# Patient Record
Sex: FEMALE | Race: WHITE | Employment: OTHER | URBAN - METROPOLITAN AREA
[De-identification: names, ages, dates, MRNs, and addresses within clinical notes are randomized per-mention and may not be internally consistent; named-entity substitution may affect disease eponyms.]

---

## 2017-11-04 ENCOUNTER — HOSPITAL ENCOUNTER (OUTPATIENT)
Dept: MAMMOGRAPHY | Age: 67
Discharge: HOME | End: 2017-11-04
Attending: INTERNAL MEDICINE
Payer: COMMERCIAL

## 2017-11-04 DIAGNOSIS — Z12.31 ENCOUNTER FOR SCREENING MAMMOGRAM FOR MALIGNANT NEOPLASM OF BREAST: ICD-10-CM

## 2017-11-04 PROCEDURE — 77067 SCR MAMMO BI INCL CAD: CPT | Performed by: INTERNAL MEDICINE

## 2017-11-11 ENCOUNTER — HOSPITAL ENCOUNTER (OUTPATIENT)
Dept: GENERAL RADIOLOGY | Facility: HOSPITAL | Age: 67
Discharge: HOME | End: 2017-11-11
Attending: INTERNAL MEDICINE
Payer: COMMERCIAL

## 2017-11-11 DIAGNOSIS — J18.9 PNEUMONIA, ORGANISM UNSPECIFIED(486): ICD-10-CM

## 2017-11-11 PROCEDURE — 71020 XR CHEST PA + LAT CHEST (CPT=71020): CPT | Performed by: INTERNAL MEDICINE

## 2018-11-06 ENCOUNTER — HOSPITAL ENCOUNTER (OUTPATIENT)
Dept: MAMMOGRAPHY | Age: 68
Discharge: HOME | End: 2018-11-06
Attending: INTERNAL MEDICINE
Payer: COMMERCIAL

## 2018-11-06 DIAGNOSIS — Z12.31 VISIT FOR SCREENING MAMMOGRAM: ICD-10-CM

## 2018-11-06 PROCEDURE — 77063 BREAST TOMOSYNTHESIS BI: CPT | Performed by: INTERNAL MEDICINE

## 2018-11-06 PROCEDURE — 77067 SCR MAMMO BI INCL CAD: CPT | Performed by: INTERNAL MEDICINE

## 2018-12-08 ENCOUNTER — HOSPITAL ENCOUNTER (OUTPATIENT)
Dept: GENERAL RADIOLOGY | Age: 68
Discharge: HOME | End: 2018-12-08
Attending: INTERNAL MEDICINE
Payer: COMMERCIAL

## 2018-12-08 DIAGNOSIS — Z00.00 ROUTINE GENERAL MEDICAL EXAMINATION AT A HEALTH CARE FACILITY: ICD-10-CM

## 2018-12-08 DIAGNOSIS — J44.9 OBSTRUCTIVE CHRONIC BRONCHITIS WITHOUT EXACERBATION (HCC): ICD-10-CM

## 2018-12-08 PROCEDURE — 71046 X-RAY EXAM CHEST 2 VIEWS: CPT | Performed by: INTERNAL MEDICINE

## 2018-12-28 PROBLEM — K50.90 CROHN'S DISEASE, UNSPECIFIED, WITHOUT COMPLICATIONS (HCC): Status: ACTIVE | Noted: 2018-12-28

## 2018-12-31 ENCOUNTER — HOSPITAL ENCOUNTER (OUTPATIENT)
Dept: BONE DENSITY | Age: 68
Discharge: HOME | End: 2018-12-31
Attending: INTERNAL MEDICINE
Payer: COMMERCIAL

## 2018-12-31 DIAGNOSIS — M81.8 OTHER OSTEOPOROSIS, UNSPECIFIED PATHOLOGICAL FRACTURE PRESENCE: ICD-10-CM

## 2018-12-31 PROCEDURE — 77080 DXA BONE DENSITY AXIAL: CPT | Performed by: INTERNAL MEDICINE

## 2019-06-06 ENCOUNTER — OFFICE VISIT (OUTPATIENT)
Dept: FAMILY MEDICINE CLINIC | Facility: CLINIC | Age: 69
End: 2019-06-06
Payer: COMMERCIAL

## 2019-06-06 DIAGNOSIS — J20.9 BRONCHITIS WITH BRONCHOSPASM: Primary | ICD-10-CM

## 2019-06-06 DIAGNOSIS — J01.40 ACUTE NON-RECURRENT PANSINUSITIS: ICD-10-CM

## 2019-06-06 PROCEDURE — 94640 AIRWAY INHALATION TREATMENT: CPT | Performed by: NURSE PRACTITIONER

## 2019-06-06 PROCEDURE — 99202 OFFICE O/P NEW SF 15 MIN: CPT | Performed by: NURSE PRACTITIONER

## 2019-06-06 RX ORDER — ALBUTEROL SULFATE 90 UG/1
2 AEROSOL, METERED RESPIRATORY (INHALATION) EVERY 4 HOURS PRN
Qty: 1 INHALER | Refills: 2 | Status: SHIPPED | OUTPATIENT
Start: 2019-06-06 | End: 2020-10-30

## 2019-06-06 RX ORDER — AMOXICILLIN AND CLAVULANATE POTASSIUM 875; 125 MG/1; MG/1
1 TABLET, FILM COATED ORAL 2 TIMES DAILY
Qty: 20 TABLET | Refills: 0 | Status: SHIPPED | OUTPATIENT
Start: 2019-06-06 | End: 2019-06-16

## 2019-06-06 RX ORDER — PREDNISONE 20 MG/1
TABLET ORAL
Qty: 18 TABLET | Refills: 0 | Status: SHIPPED | OUTPATIENT
Start: 2019-06-06 | End: 2019-06-20 | Stop reason: ALTCHOICE

## 2019-06-06 RX ORDER — IPRATROPIUM BROMIDE AND ALBUTEROL SULFATE 2.5; .5 MG/3ML; MG/3ML
3 SOLUTION RESPIRATORY (INHALATION) ONCE
Status: COMPLETED | OUTPATIENT
Start: 2019-06-06 | End: 2019-06-06

## 2019-06-06 RX ADMIN — IPRATROPIUM BROMIDE AND ALBUTEROL SULFATE 3 ML: 2.5; .5 SOLUTION RESPIRATORY (INHALATION) at 08:50:00

## 2019-06-06 RX ADMIN — IPRATROPIUM BROMIDE AND ALBUTEROL SULFATE 3 ML: 2.5; .5 SOLUTION RESPIRATORY (INHALATION) at 08:47:00

## 2019-06-06 NOTE — PATIENT INSTRUCTIONS
Bronchitis, Antibiotic Treatment (Adult)    Bronchitis is an infection of the air passages (bronchial tubes) in your lungs. It often occurs when you have a cold.  This illness is contagious during the first few days and is spread through the air by coughi Follow up with your healthcare provider, or as advised. If you had an X-ray or ECG (electrocardiogram), a specialist will review it. You will be told of any new test results that may affect your care.   If you are age 72 or older, if you smoke, or if you ha · Drink plenty of water, hot tea, and other liquids. This may help thin nasal mucus. It also may help your sinuses drain fluids. · Heat may help soothe painful areas of your face. Use a towel soaked in hot water.  Or,  the shower and direct the war Call your healthcare provider if any of these occur:  · Facial pain or headache that gets worse  · Stiff neck  · Unusual drowsiness or confusion  · Swelling of your forehead or eyelids  · Vision problems, such as blurred or double vision  · Fever of 100.4º

## 2019-06-09 VITALS
SYSTOLIC BLOOD PRESSURE: 120 MMHG | HEART RATE: 88 BPM | OXYGEN SATURATION: 98 % | RESPIRATION RATE: 22 BRPM | TEMPERATURE: 99 F | DIASTOLIC BLOOD PRESSURE: 77 MMHG

## 2019-06-09 NOTE — PROGRESS NOTES
CHIEF COMPLAINT:   \" Patient presents with:  Cough  Sinus Problem     HPI:   Carmen Ladd is a 80year old female who presents with a hx of Frontal and Maxillary sinus congestion and pressure and a severe cough.   Pt's bronchospasms became so severe t Sputum production    • Wears glasses    • Weight gain    • Wheezing       Past Surgical History:   Procedure Laterality Date   • BOWEL RESECTION     • CATARACT Left 09/2018   • CATARACT Right 10/2018   • CHOLECYSTECTOMY     • COLECTOMY     • CORRECT BUNION well and felt that she could take a deeper breath without coughing after the treatment. Decreased wheezing. Rhonchi cleared. Improved aeration to bases and apexes. Continued diffuse end inspiratory wheezes. Pt feels marked relief.   2nd DuoNeb treatmen

## 2019-08-13 ENCOUNTER — HOSPITAL ENCOUNTER (OUTPATIENT)
Dept: GENERAL RADIOLOGY | Facility: HOSPITAL | Age: 69
Discharge: HOME | End: 2019-08-13
Attending: INTERNAL MEDICINE
Payer: COMMERCIAL

## 2019-08-13 DIAGNOSIS — M25.572 LEFT ANKLE PAIN, UNSPECIFIED CHRONICITY: ICD-10-CM

## 2019-08-13 DIAGNOSIS — M79.672 PAIN OF LEFT HEEL: ICD-10-CM

## 2019-08-13 PROCEDURE — 73610 X-RAY EXAM OF ANKLE: CPT | Performed by: INTERNAL MEDICINE

## 2019-08-13 PROCEDURE — 73650 X-RAY EXAM OF HEEL: CPT | Performed by: INTERNAL MEDICINE

## 2020-03-14 ENCOUNTER — HOSPITAL ENCOUNTER (EMERGENCY)
Facility: HOSPITAL | Age: 70
Discharge: HOME | End: 2020-03-14
Attending: EMERGENCY MEDICINE
Payer: COMMERCIAL

## 2020-03-14 VITALS
HEART RATE: 82 BPM | TEMPERATURE: 98 F | DIASTOLIC BLOOD PRESSURE: 60 MMHG | BODY MASS INDEX: 26 KG/M2 | OXYGEN SATURATION: 98 % | WEIGHT: 145 LBS | SYSTOLIC BLOOD PRESSURE: 141 MMHG | RESPIRATION RATE: 18 BRPM

## 2020-03-14 DIAGNOSIS — E86.0 DEHYDRATION: Primary | ICD-10-CM

## 2020-03-14 LAB
ALBUMIN SERPL-MCNC: 3.4 G/DL (ref 3.4–5)
ALBUMIN/GLOB SERPL: 0.8 {RATIO} (ref 1–2)
ALP LIVER SERPL-CCNC: 81 U/L (ref 55–142)
ALT SERPL-CCNC: 20 U/L (ref 13–56)
ANION GAP SERPL CALC-SCNC: 8 MMOL/L (ref 0–18)
AST SERPL-CCNC: 17 U/L (ref 15–37)
BASOPHILS # BLD AUTO: 0.13 X10(3) UL (ref 0–0.2)
BASOPHILS NFR BLD AUTO: 1.4 %
BILIRUB SERPL-MCNC: 0.6 MG/DL (ref 0.1–2)
BILIRUB UR QL STRIP.AUTO: NEGATIVE
BUN BLD-MCNC: 27 MG/DL (ref 7–18)
BUN/CREAT SERPL: 23.1 (ref 10–20)
CALCIUM BLD-MCNC: 9.1 MG/DL (ref 8.5–10.1)
CHLORIDE SERPL-SCNC: 106 MMOL/L (ref 98–112)
CLARITY UR REFRACT.AUTO: CLEAR
CO2 SERPL-SCNC: 21 MMOL/L (ref 21–32)
COLOR UR AUTO: YELLOW
CREAT BLD-MCNC: 1.17 MG/DL (ref 0.55–1.02)
DEPRECATED RDW RBC AUTO: 41.6 FL (ref 35.1–46.3)
EOSINOPHIL # BLD AUTO: 0.37 X10(3) UL (ref 0–0.7)
EOSINOPHIL NFR BLD AUTO: 4.1 %
ERYTHROCYTE [DISTWIDTH] IN BLOOD BY AUTOMATED COUNT: 13 % (ref 11–15)
GLOBULIN PLAS-MCNC: 4.3 G/DL (ref 2.8–4.4)
GLUCOSE BLD-MCNC: 126 MG/DL (ref 70–99)
GLUCOSE UR STRIP.AUTO-MCNC: NEGATIVE MG/DL
HCT VFR BLD AUTO: 47.4 % (ref 35–48)
HGB BLD-MCNC: 15.6 G/DL (ref 12–16)
IMM GRANULOCYTES # BLD AUTO: 0.06 X10(3) UL (ref 0–1)
IMM GRANULOCYTES NFR BLD: 0.7 %
KETONES UR STRIP.AUTO-MCNC: NEGATIVE MG/DL
LYMPHOCYTES # BLD AUTO: 2.27 X10(3) UL (ref 1–4)
LYMPHOCYTES NFR BLD AUTO: 25.1 %
M PROTEIN MFR SERPL ELPH: 7.7 G/DL (ref 6.4–8.2)
MCH RBC QN AUTO: 29 PG (ref 26–34)
MCHC RBC AUTO-ENTMCNC: 32.9 G/DL (ref 31–37)
MCV RBC AUTO: 88.1 FL (ref 80–100)
MONOCYTES # BLD AUTO: 0.67 X10(3) UL (ref 0.1–1)
MONOCYTES NFR BLD AUTO: 7.4 %
NEUTROPHILS # BLD AUTO: 5.53 X10 (3) UL (ref 1.5–7.7)
NEUTROPHILS # BLD AUTO: 5.53 X10(3) UL (ref 1.5–7.7)
NEUTROPHILS NFR BLD AUTO: 61.3 %
NITRITE UR QL STRIP.AUTO: NEGATIVE
OSMOLALITY SERPL CALC.SUM OF ELEC: 287 MOSM/KG (ref 275–295)
PH UR STRIP.AUTO: 6 [PH] (ref 4.5–8)
PLATELET # BLD AUTO: 323 10(3)UL (ref 150–450)
POTASSIUM SERPL-SCNC: 4 MMOL/L (ref 3.5–5.1)
PROT UR STRIP.AUTO-MCNC: NEGATIVE MG/DL
RBC # BLD AUTO: 5.38 X10(6)UL (ref 3.8–5.3)
RBC UR QL AUTO: NEGATIVE
SODIUM SERPL-SCNC: 135 MMOL/L (ref 136–145)
SP GR UR STRIP.AUTO: 1.03 (ref 1–1.03)
UROBILINOGEN UR STRIP.AUTO-MCNC: <2 MG/DL
WBC # BLD AUTO: 9 X10(3) UL (ref 4–11)

## 2020-03-14 PROCEDURE — 99284 EMERGENCY DEPT VISIT MOD MDM: CPT

## 2020-03-14 PROCEDURE — 84132 ASSAY OF SERUM POTASSIUM: CPT | Performed by: EMERGENCY MEDICINE

## 2020-03-14 PROCEDURE — 81001 URINALYSIS AUTO W/SCOPE: CPT | Performed by: EMERGENCY MEDICINE

## 2020-03-14 PROCEDURE — 84450 TRANSFERASE (AST) (SGOT): CPT | Performed by: EMERGENCY MEDICINE

## 2020-03-14 PROCEDURE — 80053 COMPREHEN METABOLIC PANEL: CPT | Performed by: EMERGENCY MEDICINE

## 2020-03-14 PROCEDURE — 85025 COMPLETE CBC W/AUTO DIFF WBC: CPT | Performed by: EMERGENCY MEDICINE

## 2020-03-14 PROCEDURE — 96360 HYDRATION IV INFUSION INIT: CPT

## 2020-03-14 NOTE — ED PROVIDER NOTES
Patient Seen in: [facility] Emergency Department      History   Patient presents with:  Fatigue    Stated Complaint: hx of chrons ~ mild cramping and overall fatigued ~ possibly dehydration     HPI  70-year-old female presents to the ER complaining (Oral)   Resp 18   Wt 65.8 kg   SpO2 98%   BMI 25.69 kg/m²         Physical Exam  General: This a pleasant nontoxic appearing patient in no apparent distress alert and oriented ×3  HEENT: Pupils are equal reactive to light.   Extra ocular motions are intact ---------                               -----------         ------                     CBC W/ DIFFERENTIAL[[id number]]          Abnormal            Final result                 Please view results for these tests on the individual orders.    RAINBOW DRAW B

## 2020-11-03 ENCOUNTER — HOSPITAL ENCOUNTER (OUTPATIENT)
Dept: GENERAL RADIOLOGY | Age: 70
Discharge: HOME | End: 2020-11-03
Attending: INTERNAL MEDICINE
Payer: COMMERCIAL

## 2020-11-03 ENCOUNTER — HOSPITAL ENCOUNTER (OUTPATIENT)
Dept: MAMMOGRAPHY | Age: 70
Discharge: HOME | End: 2020-11-03
Attending: INTERNAL MEDICINE
Payer: COMMERCIAL

## 2020-11-03 DIAGNOSIS — Z12.31 ENCOUNTER FOR SCREENING MAMMOGRAM FOR MALIGNANT NEOPLASM OF BREAST: ICD-10-CM

## 2020-11-03 DIAGNOSIS — J44.9 CHRONIC OBSTRUCTIVE PULMONARY DISEASE, UNSPECIFIED COPD TYPE (HCC): ICD-10-CM

## 2020-11-03 PROCEDURE — 71046 X-RAY EXAM CHEST 2 VIEWS: CPT | Performed by: INTERNAL MEDICINE

## 2020-11-03 PROCEDURE — 77063 BREAST TOMOSYNTHESIS BI: CPT | Performed by: INTERNAL MEDICINE

## 2020-11-03 PROCEDURE — 77067 SCR MAMMO BI INCL CAD: CPT | Performed by: INTERNAL MEDICINE

## 2020-11-04 ENCOUNTER — LAB ENCOUNTER (OUTPATIENT)
Dept: LAB | Age: 70
End: 2020-11-04
Attending: INTERNAL MEDICINE
Payer: COMMERCIAL

## 2020-11-04 DIAGNOSIS — R10.32 ACUTE LEFT LOWER QUADRANT PAIN: ICD-10-CM

## 2020-11-04 PROCEDURE — 80053 COMPREHEN METABOLIC PANEL: CPT

## 2020-11-04 PROCEDURE — 36415 COLL VENOUS BLD VENIPUNCTURE: CPT

## 2020-11-04 PROCEDURE — 85025 COMPLETE CBC W/AUTO DIFF WBC: CPT

## 2020-11-18 ENCOUNTER — HOSPITAL ENCOUNTER (OUTPATIENT)
Dept: CT IMAGING | Facility: HOSPITAL | Age: 70
Discharge: HOME | End: 2020-11-18
Attending: NURSE PRACTITIONER
Payer: COMMERCIAL

## 2020-11-18 DIAGNOSIS — Z87.19 HISTORY OF CROHN'S DISEASE: ICD-10-CM

## 2020-11-18 DIAGNOSIS — R10.32 ACUTE LEFT LOWER QUADRANT PAIN: ICD-10-CM

## 2020-11-18 PROCEDURE — 74177 CT ABD & PELVIS W/CONTRAST: CPT | Performed by: NURSE PRACTITIONER

## 2020-12-21 ENCOUNTER — OFFICE VISIT (OUTPATIENT)
Dept: HEMATOLOGY/ONCOLOGY | Facility: HOSPITAL | Age: 70
End: 2020-12-21
Attending: INTERNAL MEDICINE
Payer: COMMERCIAL

## 2020-12-21 VITALS
SYSTOLIC BLOOD PRESSURE: 138 MMHG | BODY MASS INDEX: 26.77 KG/M2 | DIASTOLIC BLOOD PRESSURE: 75 MMHG | HEART RATE: 68 BPM | HEIGHT: 61.26 IN | RESPIRATION RATE: 16 BRPM | TEMPERATURE: 98 F | OXYGEN SATURATION: 98 % | WEIGHT: 143.63 LBS

## 2020-12-21 DIAGNOSIS — R59.0 RETROPERITONEAL LYMPHADENOPATHY: Primary | ICD-10-CM

## 2020-12-21 DIAGNOSIS — E79.0 ELEVATED URIC ACID IN BLOOD: ICD-10-CM

## 2020-12-21 DIAGNOSIS — K50.90 CROHN'S DISEASE WITHOUT COMPLICATION, UNSPECIFIED GASTROINTESTINAL TRACT LOCATION (HCC): ICD-10-CM

## 2020-12-21 PROCEDURE — 99245 OFF/OP CONSLTJ NEW/EST HI 55: CPT | Performed by: INTERNAL MEDICINE

## 2020-12-21 NOTE — PROGRESS NOTES
Hematology/Oncology Initial Consultation Note    Patient Name: Gena Wilson  Medical Record Number: [id number]    YOB: 1950   Date of Consultation: 12/21/2020   PCP: Dr. Alfonso Arteaga  Other providers:  Dr. Yareli Mckeon (GI)    Reason for Con Blurred vision    • Chest pain on exertion smoker    pressure   • Chronic cough    • Crohn's disease (HCC)    • Diabetes mellitus (HCC)    • Diarrhea, unspecified    • Easy bruising    • Fatigue    • Fever    • Flatulence/gas pain/belching    • Food intole MCG/ACT Inhalation Aero Soln, Inhale 2 puffs into the lungs every 6 (six) hours as needed for Wheezing., Disp: 1 Inhaler, Rfl: 0    •  Tiotropium Bromide Monohydrate (SPIRIVA RESPIMAT) 2.5 MCG/ACT Inhalation Aero Soln, Inhale 2 puffs into the lungs daily. , 65.1 kg (143 lb 9.6 oz) (12/21 1254)  BSA (Calculated - sq m): 1.65 sq meters (12/21 1254)  Pulse: 68 (12/21 1254)  BP: 138/75 (12/21 1254)  Temp: 98.1 °F (36.7 °C) (12/21 1254)  Do Not Use - Resp Rate: --  SpO2: 98 % (12/21 1254)    Wt Readings from Last CO2 25.0 11/04/2020     No results found for: PTT, PT, INR    Lab Results   Component Value Date     12/21/2020     Lab Results   Component Value Date    URIC 6.2 (H) 12/21/2020     Imaging:    CT a/p 11/18/20: FINDINGS:  LUNG BASE:  Unremarkable. enlarged retroperitoneal lymph nodes may be reactive, with other etiologies not entirely excluded. Clinical correlation recommended. PET-CT may be of further value. 3. Postoperative changes from previous total colectomy.   Right lower quadrant ileostomy

## 2020-12-21 NOTE — PROGRESS NOTES
Education Record    Learner:  Patient    Disease / Elan Quale abd lymph nodes    Barriers / Limitations:  None   Comments:    Method:  Discussion   Comments:    General Topics:  Plan of care reviewed   Comments:    Outcome:  Shows understanding   C

## 2020-12-23 PROBLEM — E79.0 ELEVATED URIC ACID IN BLOOD: Status: ACTIVE | Noted: 2020-12-23

## 2020-12-23 PROBLEM — R59.0 RETROPERITONEAL LYMPHADENOPATHY: Status: ACTIVE | Noted: 2020-12-23

## 2021-02-06 DIAGNOSIS — Z23 NEED FOR VACCINATION: ICD-10-CM

## 2021-02-07 ENCOUNTER — IMMUNIZATION (OUTPATIENT)
Dept: LAB | Age: 71
End: 2021-02-07
Attending: HOSPITALIST
Payer: COMMERCIAL

## 2021-02-07 DIAGNOSIS — Z23 NEED FOR VACCINATION: Primary | ICD-10-CM

## 2021-02-07 PROCEDURE — 0001A SARSCOV2 VAC 30MCG/0.3ML IM: CPT

## 2021-02-22 ENCOUNTER — HOSPITAL ENCOUNTER (OUTPATIENT)
Dept: CT IMAGING | Facility: HOSPITAL | Age: 71
Discharge: HOME | End: 2021-02-22
Attending: INTERNAL MEDICINE
Payer: COMMERCIAL

## 2021-02-22 DIAGNOSIS — R59.0 RETROPERITONEAL LYMPHADENOPATHY: ICD-10-CM

## 2021-02-22 LAB — CREAT BLD-MCNC: 1 MG/DL

## 2021-02-22 PROCEDURE — 82565 ASSAY OF CREATININE: CPT

## 2021-02-22 PROCEDURE — 74177 CT ABD & PELVIS W/CONTRAST: CPT | Performed by: INTERNAL MEDICINE

## 2021-02-28 ENCOUNTER — IMMUNIZATION (OUTPATIENT)
Dept: LAB | Age: 71
End: 2021-02-28
Attending: HOSPITALIST
Payer: COMMERCIAL

## 2021-02-28 DIAGNOSIS — Z23 NEED FOR VACCINATION: Primary | ICD-10-CM

## 2021-02-28 PROCEDURE — 0002A SARSCOV2 VAC 30MCG/0.3ML IM: CPT

## 2021-03-25 ENCOUNTER — LAB ENCOUNTER (OUTPATIENT)
Dept: LAB | Age: 71
End: 2021-03-25
Attending: INTERNAL MEDICINE
Payer: COMMERCIAL

## 2021-03-25 ENCOUNTER — OFFICE VISIT (OUTPATIENT)
Dept: INTERNAL MEDICINE CLINIC | Facility: CLINIC | Age: 71
End: 2021-03-25
Payer: COMMERCIAL

## 2021-03-25 VITALS
SYSTOLIC BLOOD PRESSURE: 112 MMHG | BODY MASS INDEX: 27.75 KG/M2 | HEART RATE: 69 BPM | HEIGHT: 61.02 IN | RESPIRATION RATE: 16 BRPM | TEMPERATURE: 97 F | DIASTOLIC BLOOD PRESSURE: 62 MMHG | WEIGHT: 147 LBS

## 2021-03-25 DIAGNOSIS — G89.29 CHRONIC EAR PAIN, RIGHT: ICD-10-CM

## 2021-03-25 DIAGNOSIS — H92.01 CHRONIC EAR PAIN, RIGHT: ICD-10-CM

## 2021-03-25 DIAGNOSIS — E55.9 VITAMIN D DEFICIENCY: ICD-10-CM

## 2021-03-25 DIAGNOSIS — E53.8 VITAMIN B12 DEFICIENCY: ICD-10-CM

## 2021-03-25 DIAGNOSIS — Z93.2 ILEOSTOMY STATUS (HCC): ICD-10-CM

## 2021-03-25 DIAGNOSIS — K50.118 CROHN'S DISEASE OF COLON WITH OTHER COMPLICATION (HCC): ICD-10-CM

## 2021-03-25 DIAGNOSIS — J44.9 ASTHMA-COPD OVERLAP SYNDROME (HCC): Primary | ICD-10-CM

## 2021-03-25 DIAGNOSIS — F17.200 SMOKER: ICD-10-CM

## 2021-03-25 PROBLEM — J44.89 ASTHMA-COPD OVERLAP SYNDROME: Status: ACTIVE | Noted: 2021-03-25

## 2021-03-25 PROBLEM — J44.89 ASTHMA-COPD OVERLAP SYNDROME (HCC): Status: ACTIVE | Noted: 2021-03-25

## 2021-03-25 LAB
VIT B12 SERPL-MCNC: 278 PG/ML (ref 193–986)
VIT D+METAB SERPL-MCNC: 21.1 NG/ML (ref 30–100)

## 2021-03-25 PROCEDURE — 82607 VITAMIN B-12: CPT | Performed by: INTERNAL MEDICINE

## 2021-03-25 PROCEDURE — 82306 VITAMIN D 25 HYDROXY: CPT | Performed by: INTERNAL MEDICINE

## 2021-03-25 PROCEDURE — 3074F SYST BP LT 130 MM HG: CPT | Performed by: INTERNAL MEDICINE

## 2021-03-25 PROCEDURE — 99204 OFFICE O/P NEW MOD 45 MIN: CPT | Performed by: INTERNAL MEDICINE

## 2021-03-25 PROCEDURE — 36415 COLL VENOUS BLD VENIPUNCTURE: CPT | Performed by: INTERNAL MEDICINE

## 2021-03-25 PROCEDURE — 3078F DIAST BP <80 MM HG: CPT | Performed by: INTERNAL MEDICINE

## 2021-03-25 PROCEDURE — 3008F BODY MASS INDEX DOCD: CPT | Performed by: INTERNAL MEDICINE

## 2021-03-25 RX ORDER — TIOTROPIUM BROMIDE INHALATION SPRAY 3.12 UG/1
2 SPRAY, METERED RESPIRATORY (INHALATION) DAILY
Qty: 1 INHALER | Refills: 0 | Status: SHIPPED | OUTPATIENT
Start: 2021-03-25

## 2021-03-25 RX ORDER — ALBUTEROL SULFATE 90 UG/1
2 AEROSOL, METERED RESPIRATORY (INHALATION) EVERY 6 HOURS PRN
Qty: 1 INHALER | Refills: 0 | Status: SHIPPED | OUTPATIENT
Start: 2021-03-25

## 2021-03-25 RX ORDER — BUPROPION HYDROCHLORIDE 150 MG/1
150 TABLET ORAL DAILY
Qty: 90 TABLET | Refills: 1 | Status: SHIPPED | OUTPATIENT
Start: 2021-03-25 | End: 2021-09-27

## 2021-03-25 NOTE — PROGRESS NOTES
Claribel Wagner is a 72year old female. Patient presents with:  Establish Care: SN Rm 3  Medication Eval: discuss chronic conditions, medication review      HPI:     Patient is , retired now.  Here for the first time for chronic issues, ear pain, MCG/ACT Nasal Suspension INSTILL ONE SPRAY in each nare TWICE DAILY 16 g 1   • Montelukast Sodium (SINGULAIR) 10 MG Oral Tab Take 1 tablet (10 mg total) by mouth daily. 30 tablet 3   • furosemide 20 MG Oral Tab Take 1 tablet (20 mg total) by mouth daily.  3 depressed and irritable    Vaping Use      Vaping Use: Never used    Alcohol use:  Yes      Alcohol/week: 2.0 standard drinks      Types: 2 Standard drinks or equivalent per week      Comment: no hx of excessive use, pt pt Q6mon    Drug use: Yes      Types: complication (hcc)- s/p total colectomy, no acute symptoms  Ileostomy status (hcc)  Vitamin d deficiency - pt taking 1000 units currently, check level  Vitamin b12 deficiency- due to Crohns, she has poor absorption and was doing monthly injections.  Due to

## 2021-03-29 DIAGNOSIS — E53.8 VITAMIN B12 DEFICIENCY: Primary | ICD-10-CM

## 2021-03-30 ENCOUNTER — TELEPHONE (OUTPATIENT)
Dept: INTERNAL MEDICINE CLINIC | Facility: CLINIC | Age: 71
End: 2021-03-30

## 2021-03-30 NOTE — TELEPHONE ENCOUNTER
Pt is concerned about taking this medication   buPROPion HCl ER, XL, 150 MG Oral Tablet 24 Hr 90 tablet 1 3/25/2021    Sig:   Take 1 tablet (150 mg total) by mouth daily. Route:   Oral       She has questions about it. Its time release? Is it safe?  Ple

## 2021-03-31 ENCOUNTER — NURSE ONLY (OUTPATIENT)
Dept: INTERNAL MEDICINE CLINIC | Facility: CLINIC | Age: 71
End: 2021-03-31
Payer: COMMERCIAL

## 2021-03-31 DIAGNOSIS — E53.8 VITAMIN B12 DEFICIENCY: Primary | ICD-10-CM

## 2021-03-31 PROCEDURE — 96372 THER/PROPH/DIAG INJ SC/IM: CPT | Performed by: INTERNAL MEDICINE

## 2021-03-31 RX ORDER — CYANOCOBALAMIN 1000 UG/ML
1000 INJECTION INTRAMUSCULAR; SUBCUTANEOUS ONCE
Status: COMPLETED | OUTPATIENT
Start: 2021-03-31 | End: 2021-03-31

## 2021-03-31 RX ADMIN — CYANOCOBALAMIN 1000 MCG: 1000 INJECTION INTRAMUSCULAR; SUBCUTANEOUS at 08:45:00

## 2021-04-01 RX ORDER — BUPROPION HYDROCHLORIDE 75 MG/1
75 TABLET ORAL 2 TIMES DAILY
Qty: 60 TABLET | Refills: 2 | Status: SHIPPED | OUTPATIENT
Start: 2021-04-01 | End: 2021-09-27

## 2021-04-01 NOTE — TELEPHONE ENCOUNTER
Pt concerned about the bupropion extended release. States she has an ileostomy and she thought she had been told in the past that ext. Release medications are not effective or there are some issues with absorption. Please advise.  Pt states holding med

## 2021-04-02 ENCOUNTER — TELEPHONE (OUTPATIENT)
Dept: INTERNAL MEDICINE CLINIC | Facility: CLINIC | Age: 71
End: 2021-04-02

## 2021-04-02 NOTE — TELEPHONE ENCOUNTER
Patient indicates pharmacy told her it was half the dose. Patient was advised medication is standard release and not ER as she indicates she had issues with absorption with ER. Patient denies questions or concerns.

## 2021-04-02 NOTE — TELEPHONE ENCOUNTER
Patient states the 75 mg BID may cost more than what she would like to spend. Patient will check with pharmacy and call with any changes needed.

## 2021-04-02 NOTE — TELEPHONE ENCOUNTER
Patient states she has questions regarding the medication;    buPROPion HCl 75 MG Oral Tab 60 tablet 2 4/1/2021    Sig:   Take 1 tablet (75 mg total) by mouth 2 (two) times daily.        Please advise

## 2021-04-20 ENCOUNTER — TELEPHONE (OUTPATIENT)
Dept: INTERNAL MEDICINE CLINIC | Facility: CLINIC | Age: 71
End: 2021-04-20

## 2021-04-20 NOTE — TELEPHONE ENCOUNTER
Pt stated the below medication is making her irritable, nauseous, unable to sleep, ringing in her ear. Wants to know what else can substitute for it. Pt stated she's taking Chantix twice in the last 3 years but has bad side effects as well.   Please advis

## 2021-04-20 NOTE — TELEPHONE ENCOUNTER
Patient notified to stop Bupropion due to side effects. Per TB, pt notified she can try the over the counter Nicotine patch and call our office with progress. Pt verbalizes understanding.

## 2021-04-29 ENCOUNTER — NURSE ONLY (OUTPATIENT)
Dept: INTERNAL MEDICINE CLINIC | Facility: CLINIC | Age: 71
End: 2021-04-29
Payer: COMMERCIAL

## 2021-04-29 PROCEDURE — 96372 THER/PROPH/DIAG INJ SC/IM: CPT | Performed by: INTERNAL MEDICINE

## 2021-04-29 RX ADMIN — CYANOCOBALAMIN 1000 MCG: 1000 INJECTION INTRAMUSCULAR; SUBCUTANEOUS at 08:20:00

## 2021-05-27 ENCOUNTER — NURSE ONLY (OUTPATIENT)
Dept: INTERNAL MEDICINE CLINIC | Facility: CLINIC | Age: 71
End: 2021-05-27
Payer: COMMERCIAL

## 2021-05-27 DIAGNOSIS — E53.8 VITAMIN B12 DEFICIENCY: Primary | ICD-10-CM

## 2021-05-27 PROCEDURE — 96372 THER/PROPH/DIAG INJ SC/IM: CPT | Performed by: INTERNAL MEDICINE

## 2021-05-27 RX ORDER — CYANOCOBALAMIN 1000 UG/ML
1000 INJECTION INTRAMUSCULAR; SUBCUTANEOUS ONCE
Status: COMPLETED | OUTPATIENT
Start: 2021-05-27 | End: 2021-05-27

## 2021-05-27 RX ADMIN — CYANOCOBALAMIN 1000 MCG: 1000 INJECTION INTRAMUSCULAR; SUBCUTANEOUS at 08:29:00

## 2021-07-01 ENCOUNTER — NURSE ONLY (OUTPATIENT)
Dept: INTERNAL MEDICINE CLINIC | Facility: CLINIC | Age: 71
End: 2021-07-01
Payer: COMMERCIAL

## 2021-07-01 ENCOUNTER — TELEPHONE (OUTPATIENT)
Dept: INTERNAL MEDICINE CLINIC | Facility: CLINIC | Age: 71
End: 2021-07-01

## 2021-07-01 DIAGNOSIS — E53.8 VITAMIN B12 DEFICIENCY: Primary | ICD-10-CM

## 2021-07-01 DIAGNOSIS — M17.0 ARTHRITIS OF BOTH KNEES: Primary | ICD-10-CM

## 2021-07-01 PROCEDURE — 96372 THER/PROPH/DIAG INJ SC/IM: CPT | Performed by: INTERNAL MEDICINE

## 2021-07-01 RX ORDER — CYANOCOBALAMIN 1000 UG/ML
1000 INJECTION INTRAMUSCULAR; SUBCUTANEOUS ONCE
Status: COMPLETED | OUTPATIENT
Start: 2021-07-01 | End: 2021-07-01

## 2021-07-01 RX ADMIN — CYANOCOBALAMIN 1000 MCG: 1000 INJECTION INTRAMUSCULAR; SUBCUTANEOUS at 08:28:00

## 2021-07-01 NOTE — TELEPHONE ENCOUNTER
Pt had appt with TB for 07/02/21 to address the arthritis in her knees.  Pt cancelled that appt and asked if she can just get a referral to ortho     Please advise

## 2021-07-02 NOTE — TELEPHONE ENCOUNTER
Patient referred to Dr Imani Akins, referral placed, info given to call to schedule an appointment for evaluation. Pt notified she is due for Annual CPE in 2 months. Pt verbalizes understanding.

## 2021-07-19 ENCOUNTER — TELEPHONE (OUTPATIENT)
Dept: INTERNAL MEDICINE CLINIC | Facility: CLINIC | Age: 71
End: 2021-07-19

## 2021-07-19 DIAGNOSIS — Z13.220 SCREENING FOR LIPID DISORDERS: ICD-10-CM

## 2021-07-19 DIAGNOSIS — Z13.0 SCREENING FOR BLOOD DISEASE: ICD-10-CM

## 2021-07-19 DIAGNOSIS — Z13.29 SCREENING FOR THYROID DISORDER: ICD-10-CM

## 2021-07-19 DIAGNOSIS — Z13.228 SCREENING FOR METABOLIC DISORDER: ICD-10-CM

## 2021-07-19 DIAGNOSIS — Z00.00 ROUTINE GENERAL MEDICAL EXAMINATION AT A HEALTH CARE FACILITY: Primary | ICD-10-CM

## 2021-07-19 NOTE — TELEPHONE ENCOUNTER
Orders to Jacki Morgan    Pt aware to get labs done no sooner than 2 weeks prior to the appt. Pt aware to fast.  No call back required.     Future Appointments   Date Time Provider Angeles Denise   9/27/2021 10:20 AM Daniel Fitzgerald MD [facility]

## 2021-07-29 ENCOUNTER — NURSE ONLY (OUTPATIENT)
Dept: INTERNAL MEDICINE CLINIC | Facility: CLINIC | Age: 71
End: 2021-07-29
Payer: COMMERCIAL

## 2021-07-29 PROCEDURE — 96372 THER/PROPH/DIAG INJ SC/IM: CPT | Performed by: INTERNAL MEDICINE

## 2021-07-29 RX ADMIN — CYANOCOBALAMIN 1000 MCG: 1000 INJECTION INTRAMUSCULAR; SUBCUTANEOUS at 08:35:00

## 2021-08-25 ENCOUNTER — TELEPHONE (OUTPATIENT)
Dept: INTERNAL MEDICINE CLINIC | Facility: CLINIC | Age: 71
End: 2021-08-25

## 2021-08-25 NOTE — TELEPHONE ENCOUNTER
Please let patient know only scheduled for B12- we do not have flu vaccines. Patient is not due for pneumonia vaccine.

## 2021-08-25 NOTE — TELEPHONE ENCOUNTER
Patient is not due to pneumonia vaccine- no flu vaccinations available as of current. Can continue with B12 unless otherwise indicated. will need to check b12 prior to next visit for instructions on continuity of management.      Pneumococcal (Prevnar 13) 9/25/2020           Pneumovax 23 12/27/2018

## 2021-08-25 NOTE — TELEPHONE ENCOUNTER
Pt is scheduled for a B-12 tomorrow and called to request to have the flu vacc +pneumonia vacc Please advise

## 2021-08-26 ENCOUNTER — NURSE ONLY (OUTPATIENT)
Dept: INTERNAL MEDICINE CLINIC | Facility: CLINIC | Age: 71
End: 2021-08-26
Payer: COMMERCIAL

## 2021-08-26 DIAGNOSIS — E53.8 VITAMIN B12 DEFICIENCY: Primary | ICD-10-CM

## 2021-08-26 PROCEDURE — 96372 THER/PROPH/DIAG INJ SC/IM: CPT | Performed by: INTERNAL MEDICINE

## 2021-08-26 RX ORDER — CYANOCOBALAMIN 1000 UG/ML
1000 INJECTION INTRAMUSCULAR; SUBCUTANEOUS ONCE
Status: COMPLETED | OUTPATIENT
Start: 2021-08-26 | End: 2021-08-26

## 2021-08-26 RX ADMIN — CYANOCOBALAMIN 1000 MCG: 1000 INJECTION INTRAMUSCULAR; SUBCUTANEOUS at 10:08:00

## 2021-09-20 ENCOUNTER — LAB ENCOUNTER (OUTPATIENT)
Dept: LAB | Age: 71
End: 2021-09-20
Attending: INTERNAL MEDICINE
Payer: COMMERCIAL

## 2021-09-20 DIAGNOSIS — Z00.00 ROUTINE GENERAL MEDICAL EXAMINATION AT A HEALTH CARE FACILITY: ICD-10-CM

## 2021-09-20 DIAGNOSIS — Z13.220 SCREENING FOR LIPID DISORDERS: ICD-10-CM

## 2021-09-20 DIAGNOSIS — Z13.0 SCREENING FOR BLOOD DISEASE: ICD-10-CM

## 2021-09-20 DIAGNOSIS — E53.8 VITAMIN B12 DEFICIENCY: ICD-10-CM

## 2021-09-20 DIAGNOSIS — Z13.228 SCREENING FOR METABOLIC DISORDER: ICD-10-CM

## 2021-09-20 DIAGNOSIS — Z13.29 SCREENING FOR THYROID DISORDER: ICD-10-CM

## 2021-09-20 LAB
ALBUMIN SERPL-MCNC: 3.3 G/DL (ref 3.4–5)
ALBUMIN/GLOB SERPL: 0.9 {RATIO} (ref 1–2)
ALP LIVER SERPL-CCNC: 80 U/L
ALT SERPL-CCNC: 15 U/L
ANION GAP SERPL CALC-SCNC: 7 MMOL/L (ref 0–18)
AST SERPL-CCNC: 18 U/L (ref 15–37)
BASOPHILS # BLD AUTO: 0.14 X10(3) UL (ref 0–0.2)
BASOPHILS NFR BLD AUTO: 1.7 %
BILIRUB SERPL-MCNC: 0.7 MG/DL (ref 0.1–2)
BUN BLD-MCNC: 30 MG/DL (ref 7–18)
CALCIUM BLD-MCNC: 8.8 MG/DL (ref 8.5–10.1)
CHLORIDE SERPL-SCNC: 107 MMOL/L (ref 98–112)
CHOLEST SERPL-MCNC: 147 MG/DL (ref ?–200)
CO2 SERPL-SCNC: 23 MMOL/L (ref 21–32)
CREAT BLD-MCNC: 1.09 MG/DL
EOSINOPHIL # BLD AUTO: 0.38 X10(3) UL (ref 0–0.7)
EOSINOPHIL NFR BLD AUTO: 4.7 %
ERYTHROCYTE [DISTWIDTH] IN BLOOD BY AUTOMATED COUNT: 13.9 %
GLOBULIN PLAS-MCNC: 3.7 G/DL (ref 2.8–4.4)
GLUCOSE BLD-MCNC: 91 MG/DL (ref 70–99)
HCT VFR BLD AUTO: 45.5 %
HDLC SERPL-MCNC: 53 MG/DL (ref 40–59)
HGB BLD-MCNC: 14.9 G/DL
IMM GRANULOCYTES # BLD AUTO: 0.1 X10(3) UL (ref 0–1)
IMM GRANULOCYTES NFR BLD: 1.2 %
LDLC SERPL CALC-MCNC: 72 MG/DL (ref ?–100)
LYMPHOCYTES # BLD AUTO: 2.3 X10(3) UL (ref 1–4)
LYMPHOCYTES NFR BLD AUTO: 28.7 %
MCH RBC QN AUTO: 28.9 PG (ref 26–34)
MCHC RBC AUTO-ENTMCNC: 32.7 G/DL (ref 31–37)
MCV RBC AUTO: 88.2 FL
MONOCYTES # BLD AUTO: 0.73 X10(3) UL (ref 0.1–1)
MONOCYTES NFR BLD AUTO: 9.1 %
NEUTROPHILS # BLD AUTO: 4.37 X10 (3) UL (ref 1.5–7.7)
NEUTROPHILS # BLD AUTO: 4.37 X10(3) UL (ref 1.5–7.7)
NEUTROPHILS NFR BLD AUTO: 54.6 %
NONHDLC SERPL-MCNC: 94 MG/DL (ref ?–130)
OSMOLALITY SERPL CALC.SUM OF ELEC: 290 MOSM/KG (ref 275–295)
PATIENT FASTING Y/N/NP: YES
PATIENT FASTING Y/N/NP: YES
PLATELET # BLD AUTO: 298 10(3)UL (ref 150–450)
POTASSIUM SERPL-SCNC: 4 MMOL/L (ref 3.5–5.1)
PROT SERPL-MCNC: 7 G/DL (ref 6.4–8.2)
RBC # BLD AUTO: 5.16 X10(6)UL
SODIUM SERPL-SCNC: 137 MMOL/L (ref 136–145)
TRIGL SERPL-MCNC: 126 MG/DL (ref 30–149)
TSI SER-ACNC: 1.67 MIU/ML (ref 0.36–3.74)
VIT B12 SERPL-MCNC: 521 PG/ML (ref 193–986)
VLDLC SERPL CALC-MCNC: 19 MG/DL (ref 0–30)
WBC # BLD AUTO: 8 X10(3) UL (ref 4–11)

## 2021-09-20 PROCEDURE — 80050 GENERAL HEALTH PANEL: CPT | Performed by: INTERNAL MEDICINE

## 2021-09-20 PROCEDURE — 82607 VITAMIN B-12: CPT | Performed by: INTERNAL MEDICINE

## 2021-09-20 PROCEDURE — 80061 LIPID PANEL: CPT | Performed by: INTERNAL MEDICINE

## 2021-09-27 ENCOUNTER — OFFICE VISIT (OUTPATIENT)
Dept: INTERNAL MEDICINE CLINIC | Facility: CLINIC | Age: 71
End: 2021-09-27
Payer: COMMERCIAL

## 2021-09-27 VITALS
DIASTOLIC BLOOD PRESSURE: 72 MMHG | WEIGHT: 143.81 LBS | RESPIRATION RATE: 16 BRPM | TEMPERATURE: 98 F | HEART RATE: 79 BPM | HEIGHT: 61 IN | BODY MASS INDEX: 27.15 KG/M2 | OXYGEN SATURATION: 98 % | SYSTOLIC BLOOD PRESSURE: 124 MMHG

## 2021-09-27 DIAGNOSIS — Z23 NEED FOR SHINGLES VACCINE: ICD-10-CM

## 2021-09-27 DIAGNOSIS — E55.9 VITAMIN D DEFICIENCY: ICD-10-CM

## 2021-09-27 DIAGNOSIS — Z78.0 POST-MENOPAUSAL: ICD-10-CM

## 2021-09-27 DIAGNOSIS — E53.8 VITAMIN B12 DEFICIENCY: ICD-10-CM

## 2021-09-27 DIAGNOSIS — Z12.31 ENCOUNTER FOR SCREENING MAMMOGRAM FOR MALIGNANT NEOPLASM OF BREAST: ICD-10-CM

## 2021-09-27 DIAGNOSIS — Z00.00 ROUTINE GENERAL MEDICAL EXAMINATION AT A HEALTH CARE FACILITY: Primary | ICD-10-CM

## 2021-09-27 DIAGNOSIS — H93.13 TINNITUS OF BOTH EARS: ICD-10-CM

## 2021-09-27 PROCEDURE — 3078F DIAST BP <80 MM HG: CPT | Performed by: INTERNAL MEDICINE

## 2021-09-27 PROCEDURE — 90750 HZV VACC RECOMBINANT IM: CPT | Performed by: INTERNAL MEDICINE

## 2021-09-27 PROCEDURE — 90471 IMMUNIZATION ADMIN: CPT | Performed by: INTERNAL MEDICINE

## 2021-09-27 PROCEDURE — 3008F BODY MASS INDEX DOCD: CPT | Performed by: INTERNAL MEDICINE

## 2021-09-27 PROCEDURE — 99397 PER PM REEVAL EST PAT 65+ YR: CPT | Performed by: INTERNAL MEDICINE

## 2021-09-27 PROCEDURE — 3074F SYST BP LT 130 MM HG: CPT | Performed by: INTERNAL MEDICINE

## 2021-09-27 NOTE — PROGRESS NOTES
Patient presents with:  Physical: RM 4 JY      HPI:    Patient is here for CPE   for years, wondering about pelvic/pap exam. Discussed we can do pelvic today but if normal pap in the past, pap smears can stop after age of 73.  She would like to do th B12 deficiency     Vitamin D deficiency     [facility]      Past Medical History:   Diagnosis Date   • Abdominal distention    • Abdominal pain     years ago before ostomy   • Anemia     occasional in the past   • Anxiety    • Arthritis    • At Maternal Grandmother    • Diabetes Father    • Cancer Father         lung, +smoker   • Diabetes Brother    • Crohn's Disease Brother    • Cancer Brother         lung cancer, +smoker   • Crohn's Disease Sister    • Crohn's Disease Sister      Social History 09/27/2021       Physical Exam  /72 (BP Location: Left arm, Patient Position: Sitting, Cuff Size: adult)   Pulse 79   Temp 98 °F (36.7 °C)   Resp 16   Ht 5' 1\" (1.549 m)   Wt 143 lb 12.8 oz (65.2 kg)   LMP  (LMP Unknown)   SpO2 98%   BMI 27.17 kg/m² to ENT  Fungal rash- lotrimin as directed  Encounter for screening mammogram for malignant neoplasm of breast  Post-menopausal  Need for shingles vaccine    Orders Placed This Encounter      Vitamin B12 [E]      VITAMIN D, 1,25 DIHYDROXY [42304][Q]      Zo

## 2021-11-05 ENCOUNTER — TELEPHONE (OUTPATIENT)
Dept: INTERNAL MEDICINE CLINIC | Facility: CLINIC | Age: 71
End: 2021-11-05

## 2021-11-05 NOTE — TELEPHONE ENCOUNTER
Informed pt via [facility] that she is not due for her pneumonia vaccine, but is due for flu. Informed her that she can receive her second shingles vaccine anytime after 11/27.

## 2021-11-05 NOTE — TELEPHONE ENCOUNTER
Pt scheduled nurse visit for flu shot and pneumonia on Monday, please advise and place order.       Future Appointments   Date Time Provider Angeles Walker   11/8/2021 11:15 AM [facility] N[facility]SE [facility]

## 2021-11-05 NOTE — TELEPHONE ENCOUNTER
Routing to provider for review. Pt received Pneumovax 23 on 12/27/2018 and Pneumococcal Prevnar 13 on 9/25/2020. Is pt due for pneumonia vaccine?

## 2021-11-05 NOTE — TELEPHONE ENCOUNTER
11/27 or after she can get second shingrix  Due for flu vaccine currently but not prevnar or pneumovax (completed these)

## 2021-11-08 ENCOUNTER — TELEPHONE (OUTPATIENT)
Dept: INTERNAL MEDICINE CLINIC | Facility: CLINIC | Age: 71
End: 2021-11-08

## 2021-11-08 ENCOUNTER — NURSE ONLY (OUTPATIENT)
Dept: INTERNAL MEDICINE CLINIC | Facility: CLINIC | Age: 71
End: 2021-11-08
Payer: COMMERCIAL

## 2021-11-08 DIAGNOSIS — Z23 NEED FOR SHINGLES VACCINE: Primary | ICD-10-CM

## 2021-11-08 PROCEDURE — 90662 IIV NO PRSV INCREASED AG IM: CPT | Performed by: INTERNAL MEDICINE

## 2021-11-08 PROCEDURE — 90471 IMMUNIZATION ADMIN: CPT | Performed by: INTERNAL MEDICINE

## 2021-11-09 NOTE — TELEPHONE ENCOUNTER
11/27 is soonest she can get shingrix because it should be 2 months from first. Please make sure she knows about this  I signed the order

## 2021-11-16 ENCOUNTER — HOSPITAL ENCOUNTER (OUTPATIENT)
Dept: MAMMOGRAPHY | Age: 71
Discharge: HOME | End: 2021-11-16
Attending: INTERNAL MEDICINE
Payer: COMMERCIAL

## 2021-11-16 ENCOUNTER — HOSPITAL ENCOUNTER (OUTPATIENT)
Dept: BONE DENSITY | Age: 71
Discharge: HOME | End: 2021-11-16
Attending: INTERNAL MEDICINE
Payer: COMMERCIAL

## 2021-11-16 DIAGNOSIS — Z12.31 ENCOUNTER FOR SCREENING MAMMOGRAM FOR MALIGNANT NEOPLASM OF BREAST: ICD-10-CM

## 2021-11-16 DIAGNOSIS — Z78.0 POST-MENOPAUSAL: ICD-10-CM

## 2021-11-16 PROCEDURE — 77080 DXA BONE DENSITY AXIAL: CPT | Performed by: INTERNAL MEDICINE

## 2021-11-16 PROCEDURE — 77067 SCR MAMMO BI INCL CAD: CPT | Performed by: INTERNAL MEDICINE

## 2021-11-16 PROCEDURE — 77063 BREAST TOMOSYNTHESIS BI: CPT | Performed by: INTERNAL MEDICINE

## 2021-11-18 ENCOUNTER — LAB ENCOUNTER (OUTPATIENT)
Dept: LAB | Age: 71
End: 2021-11-18
Attending: INTERNAL MEDICINE
Payer: COMMERCIAL

## 2021-11-18 DIAGNOSIS — E55.9 VITAMIN D DEFICIENCY: ICD-10-CM

## 2021-11-18 DIAGNOSIS — E53.8 VITAMIN B12 DEFICIENCY: ICD-10-CM

## 2021-11-18 PROCEDURE — 82607 VITAMIN B-12: CPT | Performed by: INTERNAL MEDICINE

## 2021-11-18 PROCEDURE — 82652 VIT D 1 25-DIHYDROXY: CPT | Performed by: INTERNAL MEDICINE

## 2021-11-19 ENCOUNTER — TELEPHONE (OUTPATIENT)
Dept: INTERNAL MEDICINE CLINIC | Facility: CLINIC | Age: 71
End: 2021-11-19

## 2021-11-19 DIAGNOSIS — E53.8 B12 DEFICIENCY: Primary | ICD-10-CM

## 2021-12-03 ENCOUNTER — TELEPHONE (OUTPATIENT)
Dept: INTERNAL MEDICINE CLINIC | Facility: CLINIC | Age: 71
End: 2021-12-03

## 2021-12-03 ENCOUNTER — NURSE ONLY (OUTPATIENT)
Dept: INTERNAL MEDICINE CLINIC | Facility: CLINIC | Age: 71
End: 2021-12-03
Payer: COMMERCIAL

## 2021-12-03 DIAGNOSIS — E53.8 B12 DEFICIENCY: Primary | ICD-10-CM

## 2021-12-03 PROCEDURE — 90750 HZV VACC RECOMBINANT IM: CPT | Performed by: INTERNAL MEDICINE

## 2021-12-03 PROCEDURE — 90471 IMMUNIZATION ADMIN: CPT | Performed by: INTERNAL MEDICINE

## 2021-12-03 PROCEDURE — 96372 THER/PROPH/DIAG INJ SC/IM: CPT | Performed by: INTERNAL MEDICINE

## 2021-12-03 RX ORDER — CYANOCOBALAMIN 1000 UG/ML
1000 INJECTION INTRAMUSCULAR; SUBCUTANEOUS ONCE
Status: COMPLETED | OUTPATIENT
Start: 2021-12-03 | End: 2021-12-03

## 2021-12-03 RX ADMIN — CYANOCOBALAMIN 1000 MCG: 1000 INJECTION INTRAMUSCULAR; SUBCUTANEOUS at 08:29:00

## 2021-12-03 NOTE — TELEPHONE ENCOUNTER
Pt had her b-12 today and forgot to ask. She is starting a new regimen of monthly b-12 shots in office. She was taking a daily dose (25 drops of for over the counter b12) Does she continue that as well?  Please advise

## 2021-12-03 NOTE — TELEPHONE ENCOUNTER
Per TB b12 result note from 11/18:  B12 drifting down, when she comes in on 12/3 we can give b12 injection too. Advise for monthly b12 injections (nurse visits)for next 4 months and then repeat level in April    Please advise. Thanks!

## 2021-12-13 ENCOUNTER — OFFICE VISIT (OUTPATIENT)
Dept: INTERNAL MEDICINE CLINIC | Facility: CLINIC | Age: 71
End: 2021-12-13
Payer: COMMERCIAL

## 2021-12-13 VITALS
BODY MASS INDEX: 28.1 KG/M2 | TEMPERATURE: 98 F | WEIGHT: 148.81 LBS | RESPIRATION RATE: 18 BRPM | HEART RATE: 65 BPM | SYSTOLIC BLOOD PRESSURE: 126 MMHG | OXYGEN SATURATION: 98 % | HEIGHT: 61 IN | DIASTOLIC BLOOD PRESSURE: 68 MMHG

## 2021-12-13 DIAGNOSIS — H93.13 TINNITUS OF BOTH EARS: ICD-10-CM

## 2021-12-13 DIAGNOSIS — M25.511 ACUTE PAIN OF RIGHT SHOULDER: Primary | ICD-10-CM

## 2021-12-13 DIAGNOSIS — H04.123 DRY EYES: ICD-10-CM

## 2021-12-13 PROCEDURE — 3078F DIAST BP <80 MM HG: CPT | Performed by: INTERNAL MEDICINE

## 2021-12-13 PROCEDURE — 3008F BODY MASS INDEX DOCD: CPT | Performed by: INTERNAL MEDICINE

## 2021-12-13 PROCEDURE — 3074F SYST BP LT 130 MM HG: CPT | Performed by: INTERNAL MEDICINE

## 2021-12-13 PROCEDURE — 99213 OFFICE O/P EST LOW 20 MIN: CPT | Performed by: INTERNAL MEDICINE

## 2021-12-13 RX ORDER — NAPROXEN 500 MG/1
500 TABLET ORAL 2 TIMES DAILY WITH MEALS
Qty: 30 TABLET | Refills: 0 | Status: SHIPPED | OUTPATIENT
Start: 2021-12-13

## 2021-12-13 NOTE — PROGRESS NOTES
Rehan Antonio is a 80year old female. Patient presents with:  Shoulder Pain: ES rm - 3 Intermittent right dull aching shoulder pain for 6 days at a 4/10.       HPI:     C/o right shoulder pain for 6 days, had something similar 20+ years ago and CSI he years ago   • Blurred vision    • Chest pain on exertion smoker    pressure   • Chronic cough    • Crohn's disease ([facility]    • Diabetes mellitus (HCC)    • Diarrhea, unspecified    • Easy bruising    • Fatigue    • Fever    • Flatulence/gas pain/belching Runny nose,                            WHEEZING      REVIEW OF SYSTEMS:   GENERAL HEALTH:  no fevers  RESPIRATORY: no cough  CARDIOVASCULAR: denies chest pain  GI: denies abdominal pain  MS: as above      EXAM:   /68 (BP Location: Left arm, Patient P

## 2021-12-23 ENCOUNTER — TELEPHONE (OUTPATIENT)
Dept: INTERNAL MEDICINE CLINIC | Facility: CLINIC | Age: 71
End: 2021-12-23

## 2021-12-23 NOTE — TELEPHONE ENCOUNTER
A few days ago her rash under her breasts came back-states she had it a few months ago but does not think she saw TB for this-she also had a rash in her groin that TB has seen and told her to us lotrimin-she tried that under her breasts but did not help-ve

## 2021-12-24 RX ORDER — CLOTRIMAZOLE AND BETAMETHASONE DIPROPIONATE 10; .64 MG/G; MG/G
CREAM TOPICAL
Qty: 60 G | Refills: 0 | Status: SHIPPED | OUTPATIENT
Start: 2021-12-24

## 2022-04-12 ENCOUNTER — LABORATORY ENCOUNTER (OUTPATIENT)
Dept: LAB | Age: 72
End: 2022-04-12
Attending: INTERNAL MEDICINE
Payer: COMMERCIAL

## 2022-04-12 DIAGNOSIS — E53.8 B12 DEFICIENCY: ICD-10-CM

## 2022-04-12 LAB — VIT B12 SERPL-MCNC: 692 PG/ML (ref 193–986)

## 2022-04-12 PROCEDURE — 36415 COLL VENOUS BLD VENIPUNCTURE: CPT

## 2022-04-12 PROCEDURE — 82607 VITAMIN B-12: CPT

## 2022-05-03 ENCOUNTER — TELEPHONE (OUTPATIENT)
Dept: INTERNAL MEDICINE CLINIC | Facility: CLINIC | Age: 72
End: 2022-05-03

## 2022-05-03 NOTE — TELEPHONE ENCOUNTER
Pt called to ask if she should see TB again regarding re-occurring rash under both breasts or see dermatologist. TB did prescribe cream for her and she uses it and it gets better but comes back.  Would like her opinion

## 2022-05-04 NOTE — TELEPHONE ENCOUNTER
Spoke to pt. Informed her TB recommends seeing Dr. Roxanna Huff. Information given to pt. Pt stated understanding and agreed to plan. Pt also said that her ENT advised her to f/u with her PCP given her most recent labs. He has concern for pre-diabetes and high chol. Asked pt to have her ENT fax us the results for TB to review. Pt though they had already done that. Do not see we have received anything. Pt said that she will call them and ask them to re-fax. FYI to TB.

## 2022-05-16 ENCOUNTER — TELEPHONE (OUTPATIENT)
Dept: INTERNAL MEDICINE CLINIC | Facility: CLINIC | Age: 72
End: 2022-05-16

## 2022-05-16 ENCOUNTER — PATIENT MESSAGE (OUTPATIENT)
Dept: INTERNAL MEDICINE CLINIC | Facility: CLINIC | Age: 72
End: 2022-05-16

## 2022-05-16 NOTE — TELEPHONE ENCOUNTER
Do not see any documentation of pt asking this previously. BUN and creat completed 2/8/22. CT completed 2/11/22.      TB, would you be able to review for pt?

## 2022-05-16 NOTE — TELEPHONE ENCOUNTER
From: Marcial Richardson  To: Jeff Daniels MD  Sent: 5/16/2022 10:24 AM CDT  Subject: Blood Test Results from Dr. Caden Aguayo told me to follow up with Dr. Mi Moreno re: his blood work drawn Feb. 8 and concerns over cholesterol and pre-diabetes. After 2 phone calls to Dr. Karely Davis office I have still not received feedback from Dr. Karely Davis office.   Thank you

## 2022-05-16 NOTE — TELEPHONE ENCOUNTER
Pt is following up. She was told TB would review Dr Jeffers Levo lab results and MRI results and would get back to her. She is still waiting for a call back.

## 2022-05-17 NOTE — TELEPHONE ENCOUNTER
I think we did already go over the labs in 2/10 telephone encounter. Basically, BUN/Cr stable. Advise to drink plenty of fluids. CT results are there but I do not see MRI results. Are we missing those?

## 2022-05-17 NOTE — TELEPHONE ENCOUNTER
Brit's office calling to state that they have been trying to fax us results but they aren't going through for some reason. She will keep trying, they  Have correct fax number.

## 2022-05-17 NOTE — TELEPHONE ENCOUNTER
Patient states she had other labs with Dr Ade Corey in February, he asked to call our office for recommendation for prediabetes and elevated cholesterol. Also pt states she had MRI's with Dr Ade Corey, results have not be received as yet. Pt will call Dr Ni Alarcon office to ask them to fax labs and MRI's, fax number given. Hold for fax to be sent to Triage RN's.

## 2022-05-31 NOTE — TELEPHONE ENCOUNTER
LVM for pt to call our office regarding confirmation of  MRI results from [address] [place] [address] in care everywhere on 4/14/22 MRI of the brain and 4/15/22 MRI soft tissue neck. Printed results and sent them to scanning.

## 2022-05-31 NOTE — TELEPHONE ENCOUNTER
Pt calling to see if we have received MRI results yet. She states Dr. Farzad Daly office has tried sending them to us multiple times. Can we access this in care everywhere?

## 2022-06-02 NOTE — TELEPHONE ENCOUNTER
Reviewed results, would advise for baby ASA 81mg daily due to chronic microvascular ischemic changes seen in MRI brain.   Cholesterol should be checked yearly as well, last lipid profile was good

## 2022-06-15 ENCOUNTER — LAB ENCOUNTER (OUTPATIENT)
Dept: LAB | Age: 72
End: 2022-06-15
Attending: OTOLARYNGOLOGY
Payer: COMMERCIAL

## 2022-06-15 ENCOUNTER — TELEPHONE (OUTPATIENT)
Dept: INTERNAL MEDICINE CLINIC | Facility: CLINIC | Age: 72
End: 2022-06-15

## 2022-06-15 DIAGNOSIS — N28.9 MILD RENAL INSUFFICIENCY: Primary | ICD-10-CM

## 2022-06-15 DIAGNOSIS — N28.9 MILD RENAL INSUFFICIENCY: ICD-10-CM

## 2022-06-15 LAB
ANION GAP SERPL CALC-SCNC: 6 MMOL/L (ref 0–18)
BUN BLD-MCNC: 27 MG/DL (ref 7–18)
CALCIUM BLD-MCNC: 9.5 MG/DL (ref 8.5–10.1)
CHLORIDE SERPL-SCNC: 109 MMOL/L (ref 98–112)
CO2 SERPL-SCNC: 25 MMOL/L (ref 21–32)
CREAT BLD-MCNC: 1.27 MG/DL
FASTING STATUS PATIENT QL REPORTED: NO
GLUCOSE BLD-MCNC: 126 MG/DL (ref 70–99)
OSMOLALITY SERPL CALC.SUM OF ELEC: 297 MOSM/KG (ref 275–295)
POTASSIUM SERPL-SCNC: 4.1 MMOL/L (ref 3.5–5.1)
SODIUM SERPL-SCNC: 140 MMOL/L (ref 136–145)

## 2022-06-15 PROCEDURE — 80048 BASIC METABOLIC PNL TOTAL CA: CPT

## 2022-06-15 PROCEDURE — 36415 COLL VENOUS BLD VENIPUNCTURE: CPT

## 2022-06-15 NOTE — TELEPHONE ENCOUNTER
Ben Hernandes MD   Physician   Specialty:  Internal Medicine   Telephone Encounter   Signed   Encounter Date:  5/22/2022         Mild renal insuff - patient to push fluids and repeat BMP within a few weeks, I will place order    BMP ordered. Pt notified.

## 2022-06-24 ENCOUNTER — TELEPHONE (OUTPATIENT)
Dept: INTERNAL MEDICINE CLINIC | Facility: CLINIC | Age: 72
End: 2022-06-24

## 2022-06-24 DIAGNOSIS — Z13.29 SCREENING FOR THYROID DISORDER: ICD-10-CM

## 2022-06-24 DIAGNOSIS — Z13.0 SCREENING FOR DISORDER OF BLOOD AND BLOOD-FORMING ORGANS: ICD-10-CM

## 2022-06-24 DIAGNOSIS — Z13.228 SCREENING FOR METABOLIC DISORDER: ICD-10-CM

## 2022-06-24 DIAGNOSIS — Z13.220 SCREENING FOR LIPID DISORDERS: ICD-10-CM

## 2022-06-24 DIAGNOSIS — Z00.00 ROUTINE GENERAL MEDICAL EXAMINATION AT A HEALTH CARE FACILITY: Primary | ICD-10-CM

## 2022-06-24 NOTE — TELEPHONE ENCOUNTER
Orders to Odalys Stephenson Pt aware to get labs done no sooner than 2 weeks prior to the appt. Pt aware to fast.  No call back required.     Future Appointments   Date Time Provider Angeles Walker   9/29/2022  9:00 AM Faraz Guzmán MD [facility]

## 2022-09-26 ENCOUNTER — LAB ENCOUNTER (OUTPATIENT)
Dept: LAB | Age: 72
End: 2022-09-26
Attending: INTERNAL MEDICINE

## 2022-09-26 ENCOUNTER — TELEPHONE (OUTPATIENT)
Dept: INTERNAL MEDICINE CLINIC | Facility: CLINIC | Age: 72
End: 2022-09-26

## 2022-09-26 DIAGNOSIS — Z13.228 SCREENING FOR METABOLIC DISORDER: ICD-10-CM

## 2022-09-26 DIAGNOSIS — Z13.0 SCREENING FOR DISORDER OF BLOOD AND BLOOD-FORMING ORGANS: ICD-10-CM

## 2022-09-26 DIAGNOSIS — Z13.29 SCREENING FOR THYROID DISORDER: ICD-10-CM

## 2022-09-26 DIAGNOSIS — Z00.00 ROUTINE GENERAL MEDICAL EXAMINATION AT A HEALTH CARE FACILITY: ICD-10-CM

## 2022-09-26 DIAGNOSIS — Z13.220 SCREENING FOR LIPID DISORDERS: ICD-10-CM

## 2022-09-26 LAB
ALBUMIN SERPL-MCNC: 3.3 G/DL (ref 3.4–5)
ALBUMIN/GLOB SERPL: 0.9 {RATIO} (ref 1–2)
ALP LIVER SERPL-CCNC: 70 U/L
ALT SERPL-CCNC: 21 U/L
ANION GAP SERPL CALC-SCNC: 5 MMOL/L (ref 0–18)
AST SERPL-CCNC: 19 U/L (ref 15–37)
BASOPHILS # BLD AUTO: 0.1 X10(3) UL (ref 0–0.2)
BASOPHILS NFR BLD AUTO: 1.4 %
BILIRUB SERPL-MCNC: 0.7 MG/DL (ref 0.1–2)
BUN BLD-MCNC: 27 MG/DL (ref 7–18)
BUN/CREAT SERPL: 25.5 (ref 10–20)
CALCIUM BLD-MCNC: 8.8 MG/DL (ref 8.5–10.1)
CHLORIDE SERPL-SCNC: 109 MMOL/L (ref 98–112)
CHOLEST SERPL-MCNC: 181 MG/DL (ref ?–200)
CO2 SERPL-SCNC: 25 MMOL/L (ref 21–32)
CREAT BLD-MCNC: 1.06 MG/DL
DEPRECATED RDW RBC AUTO: 46.1 FL (ref 35.1–46.3)
EOSINOPHIL # BLD AUTO: 0.38 X10(3) UL (ref 0–0.7)
EOSINOPHIL NFR BLD AUTO: 5.2 %
ERYTHROCYTE [DISTWIDTH] IN BLOOD BY AUTOMATED COUNT: 13.9 % (ref 11–15)
FASTING PATIENT LIPID ANSWER: YES
FASTING STATUS PATIENT QL REPORTED: YES
GFR SERPLBLD BASED ON 1.73 SQ M-ARVRAT: 56 ML/MIN/1.73M2 (ref 60–?)
GLOBULIN PLAS-MCNC: 3.7 G/DL (ref 2.8–4.4)
GLUCOSE BLD-MCNC: 97 MG/DL (ref 70–99)
HCT VFR BLD AUTO: 44.7 %
HDLC SERPL-MCNC: 52 MG/DL (ref 40–59)
HGB BLD-MCNC: 14.4 G/DL
IMM GRANULOCYTES # BLD AUTO: 0.05 X10(3) UL (ref 0–1)
IMM GRANULOCYTES NFR BLD: 0.7 %
LDLC SERPL CALC-MCNC: 104 MG/DL (ref ?–100)
LYMPHOCYTES # BLD AUTO: 2.02 X10(3) UL (ref 1–4)
LYMPHOCYTES NFR BLD AUTO: 27.5 %
MCH RBC QN AUTO: 28.9 PG (ref 26–34)
MCHC RBC AUTO-ENTMCNC: 32.2 G/DL (ref 31–37)
MCV RBC AUTO: 89.6 FL
MONOCYTES # BLD AUTO: 0.6 X10(3) UL (ref 0.1–1)
MONOCYTES NFR BLD AUTO: 8.2 %
NEUTROPHILS # BLD AUTO: 4.19 X10 (3) UL (ref 1.5–7.7)
NEUTROPHILS # BLD AUTO: 4.19 X10(3) UL (ref 1.5–7.7)
NEUTROPHILS NFR BLD AUTO: 57 %
NONHDLC SERPL-MCNC: 129 MG/DL (ref ?–130)
OSMOLALITY SERPL CALC.SUM OF ELEC: 293 MOSM/KG (ref 275–295)
PLATELET # BLD AUTO: 300 10(3)UL (ref 150–450)
POTASSIUM SERPL-SCNC: 4.5 MMOL/L (ref 3.5–5.1)
PROT SERPL-MCNC: 7 G/DL (ref 6.4–8.2)
RBC # BLD AUTO: 4.99 X10(6)UL
SODIUM SERPL-SCNC: 139 MMOL/L (ref 136–145)
T4 FREE SERPL-MCNC: 1.1 NG/DL (ref 0.8–1.7)
TRIGL SERPL-MCNC: 145 MG/DL (ref 30–149)
TSI SER-ACNC: 3.83 MIU/ML (ref 0.36–3.74)
VLDLC SERPL CALC-MCNC: 24 MG/DL (ref 0–30)
WBC # BLD AUTO: 7.3 X10(3) UL (ref 4–11)

## 2022-09-26 PROCEDURE — 80050 GENERAL HEALTH PANEL: CPT | Performed by: INTERNAL MEDICINE

## 2022-09-26 PROCEDURE — 80061 LIPID PANEL: CPT | Performed by: INTERNAL MEDICINE

## 2022-09-26 PROCEDURE — 84439 ASSAY OF FREE THYROXINE: CPT | Performed by: INTERNAL MEDICINE

## 2022-09-27 NOTE — TELEPHONE ENCOUNTER
Patient notified of immunization received and covid booster information. Patient asked if any tdap on file which I do not see, she will consider obtaining as she is aware due q 10 years.

## 2022-09-29 ENCOUNTER — OFFICE VISIT (OUTPATIENT)
Dept: INTERNAL MEDICINE CLINIC | Facility: CLINIC | Age: 72
End: 2022-09-29

## 2022-09-29 VITALS
HEART RATE: 61 BPM | HEIGHT: 64 IN | WEIGHT: 135.63 LBS | SYSTOLIC BLOOD PRESSURE: 124 MMHG | OXYGEN SATURATION: 98 % | TEMPERATURE: 98 F | RESPIRATION RATE: 16 BRPM | DIASTOLIC BLOOD PRESSURE: 68 MMHG | BODY MASS INDEX: 23.15 KG/M2

## 2022-09-29 DIAGNOSIS — Z12.31 ENCOUNTER FOR SCREENING MAMMOGRAM FOR MALIGNANT NEOPLASM OF BREAST: ICD-10-CM

## 2022-09-29 DIAGNOSIS — R79.89 ABNORMAL TSH: ICD-10-CM

## 2022-09-29 DIAGNOSIS — Z00.00 ROUTINE GENERAL MEDICAL EXAMINATION AT A HEALTH CARE FACILITY: Primary | ICD-10-CM

## 2022-09-29 DIAGNOSIS — Z93.2 ILEOSTOMY STATUS (HCC): ICD-10-CM

## 2022-09-29 DIAGNOSIS — J44.9 ASTHMA-COPD OVERLAP SYNDROME (HCC): ICD-10-CM

## 2022-09-29 PROBLEM — E53.8 VITAMIN B12 DEFICIENCY: Status: RESOLVED | Noted: 2021-03-25 | Resolved: 2022-09-29

## 2022-09-29 PROBLEM — M25.511 ACUTE PAIN OF RIGHT SHOULDER: Status: RESOLVED | Noted: 2021-12-13 | Resolved: 2022-09-29

## 2022-09-29 PROBLEM — H92.01 CHRONIC EAR PAIN, RIGHT: Status: RESOLVED | Noted: 2021-03-25 | Resolved: 2022-09-29

## 2022-09-29 PROBLEM — E55.9 VITAMIN D DEFICIENCY: Status: RESOLVED | Noted: 2021-03-25 | Resolved: 2022-09-29

## 2022-09-29 PROBLEM — G89.29 CHRONIC EAR PAIN, RIGHT: Status: RESOLVED | Noted: 2021-03-25 | Resolved: 2022-09-29

## 2022-09-29 PROCEDURE — 3074F SYST BP LT 130 MM HG: CPT | Performed by: INTERNAL MEDICINE

## 2022-09-29 PROCEDURE — 3078F DIAST BP <80 MM HG: CPT | Performed by: INTERNAL MEDICINE

## 2022-09-29 PROCEDURE — 3008F BODY MASS INDEX DOCD: CPT | Performed by: INTERNAL MEDICINE

## 2022-09-29 PROCEDURE — 99397 PER PM REEVAL EST PAT 65+ YR: CPT | Performed by: INTERNAL MEDICINE

## 2022-10-04 PROBLEM — R59.9 SWOLLEN LYMPH NODES: Status: ACTIVE | Noted: 2022-10-04

## 2022-10-04 PROBLEM — K46.9 ABDOMINAL HERNIA: Status: ACTIVE | Noted: 2022-10-04

## 2022-10-04 PROBLEM — M51.369 DEGENERATIVE DISC DISEASE, LUMBAR: Status: ACTIVE | Noted: 2022-10-04

## 2022-10-04 PROBLEM — M51.36 DEGENERATIVE DISC DISEASE, LUMBAR: Status: ACTIVE | Noted: 2022-10-04

## 2022-11-14 ENCOUNTER — OFFICE VISIT (OUTPATIENT)
Dept: INTERNAL MEDICINE CLINIC | Facility: CLINIC | Age: 72
End: 2022-11-14
Payer: COMMERCIAL

## 2022-11-14 VITALS
BODY MASS INDEX: 25.61 KG/M2 | HEIGHT: 61 IN | TEMPERATURE: 98 F | DIASTOLIC BLOOD PRESSURE: 60 MMHG | OXYGEN SATURATION: 98 % | HEART RATE: 64 BPM | RESPIRATION RATE: 18 BRPM | SYSTOLIC BLOOD PRESSURE: 138 MMHG | WEIGHT: 135.63 LBS

## 2022-11-14 DIAGNOSIS — L08.9 SKIN PUSTULE: Primary | ICD-10-CM

## 2022-11-14 DIAGNOSIS — F17.200 CURRENT SMOKER: ICD-10-CM

## 2022-11-14 PROCEDURE — 3008F BODY MASS INDEX DOCD: CPT | Performed by: INTERNAL MEDICINE

## 2022-11-14 PROCEDURE — 3078F DIAST BP <80 MM HG: CPT | Performed by: INTERNAL MEDICINE

## 2022-11-14 PROCEDURE — 3075F SYST BP GE 130 - 139MM HG: CPT | Performed by: INTERNAL MEDICINE

## 2022-11-14 PROCEDURE — 99213 OFFICE O/P EST LOW 20 MIN: CPT | Performed by: INTERNAL MEDICINE

## 2022-11-22 ENCOUNTER — HOSPITAL ENCOUNTER (OUTPATIENT)
Dept: MAMMOGRAPHY | Age: 72
Discharge: HOME | End: 2022-11-22
Attending: INTERNAL MEDICINE
Payer: COMMERCIAL

## 2022-11-22 DIAGNOSIS — Z12.31 ENCOUNTER FOR SCREENING MAMMOGRAM FOR MALIGNANT NEOPLASM OF BREAST: ICD-10-CM

## 2022-11-22 PROCEDURE — 77063 BREAST TOMOSYNTHESIS BI: CPT | Performed by: INTERNAL MEDICINE

## 2022-11-22 PROCEDURE — 77067 SCR MAMMO BI INCL CAD: CPT | Performed by: INTERNAL MEDICINE

## 2023-05-08 ENCOUNTER — TELEPHONE (OUTPATIENT)
Dept: INTERNAL MEDICINE CLINIC | Facility: CLINIC | Age: 73
End: 2023-05-08

## 2023-05-08 NOTE — TELEPHONE ENCOUNTER
Received visit note from [place] and [address] [place] [address]. Put in TB bin for review.

## 2023-06-07 ENCOUNTER — HOSPITAL ENCOUNTER (INPATIENT)
Facility: HOSPITAL | Age: 73
LOS: 2 days | Discharge: HOME | End: 2023-06-09
Attending: EMERGENCY MEDICINE | Admitting: HOSPITALIST
Payer: COMMERCIAL

## 2023-06-07 ENCOUNTER — APPOINTMENT (OUTPATIENT)
Dept: CT IMAGING | Facility: HOSPITAL | Age: 73
End: 2023-06-07
Attending: EMERGENCY MEDICINE
Payer: COMMERCIAL

## 2023-06-07 DIAGNOSIS — E87.1 HYPONATREMIA: ICD-10-CM

## 2023-06-07 DIAGNOSIS — N17.9 ACUTE RENAL INJURY (HCC): ICD-10-CM

## 2023-06-07 DIAGNOSIS — R10.9 ABDOMINAL PAIN OF UNKNOWN ETIOLOGY: Primary | ICD-10-CM

## 2023-06-07 PROBLEM — E87.20 METABOLIC ACIDOSIS: Status: ACTIVE | Noted: 2023-06-07

## 2023-06-07 PROBLEM — R79.89 AZOTEMIA: Status: ACTIVE | Noted: 2023-06-07

## 2023-06-07 PROBLEM — R73.9 HYPERGLYCEMIA: Status: ACTIVE | Noted: 2023-06-07

## 2023-06-07 LAB
ALBUMIN SERPL-MCNC: 4 G/DL (ref 3.4–5)
ALBUMIN/GLOB SERPL: 0.8 {RATIO} (ref 1–2)
ALP LIVER SERPL-CCNC: 110 U/L
ALT SERPL-CCNC: 26 U/L
ANION GAP SERPL CALC-SCNC: 10 MMOL/L (ref 0–18)
AST SERPL-CCNC: 26 U/L (ref 15–37)
BASOPHILS # BLD AUTO: 0.11 X10(3) UL (ref 0–0.2)
BASOPHILS NFR BLD AUTO: 0.9 %
BILIRUB SERPL-MCNC: 0.5 MG/DL (ref 0.1–2)
BILIRUB UR QL STRIP.AUTO: NEGATIVE
BUN BLD-MCNC: 65 MG/DL (ref 7–18)
CALCIUM BLD-MCNC: 10.6 MG/DL (ref 8.5–10.1)
CHLORIDE SERPL-SCNC: 101 MMOL/L (ref 98–112)
CK SERPL-CCNC: 42 U/L
CLARITY UR REFRACT.AUTO: CLEAR
CO2 SERPL-SCNC: 17 MMOL/L (ref 21–32)
COLOR UR AUTO: YELLOW
CREAT BLD-MCNC: 1.85 MG/DL
EOSINOPHIL # BLD AUTO: 0.22 X10(3) UL (ref 0–0.7)
EOSINOPHIL NFR BLD AUTO: 1.7 %
ERYTHROCYTE [DISTWIDTH] IN BLOOD BY AUTOMATED COUNT: 13.7 %
GFR SERPLBLD BASED ON 1.73 SQ M-ARVRAT: 29 ML/MIN/1.73M2 (ref 60–?)
GLOBULIN PLAS-MCNC: 4.8 G/DL (ref 2.8–4.4)
GLUCOSE BLD-MCNC: 128 MG/DL (ref 70–99)
GLUCOSE UR STRIP.AUTO-MCNC: NEGATIVE MG/DL
HCT VFR BLD AUTO: 53.1 %
HGB BLD-MCNC: 18.2 G/DL
HYALINE CASTS #/AREA URNS AUTO: PRESENT /LPF
IMM GRANULOCYTES # BLD AUTO: 0.14 X10(3) UL (ref 0–1)
IMM GRANULOCYTES NFR BLD: 1.1 %
KETONES UR STRIP.AUTO-MCNC: NEGATIVE MG/DL
LIPASE SERPL-CCNC: 93 U/L (ref 13–75)
LYMPHOCYTES # BLD AUTO: 2.08 X10(3) UL (ref 1–4)
LYMPHOCYTES NFR BLD AUTO: 16.3 %
MCH RBC QN AUTO: 28.3 PG (ref 26–34)
MCHC RBC AUTO-ENTMCNC: 34.3 G/DL (ref 31–37)
MCV RBC AUTO: 82.5 FL
MONOCYTES # BLD AUTO: 1.28 X10(3) UL (ref 0.1–1)
MONOCYTES NFR BLD AUTO: 10 %
NEUTROPHILS # BLD AUTO: 8.94 X10 (3) UL (ref 1.5–7.7)
NEUTROPHILS # BLD AUTO: 8.94 X10(3) UL (ref 1.5–7.7)
NEUTROPHILS NFR BLD AUTO: 70 %
NITRITE UR QL STRIP.AUTO: NEGATIVE
OSMOLALITY SERPL CALC.SUM OF ELEC: 286 MOSM/KG (ref 275–295)
PH UR STRIP.AUTO: 6 [PH] (ref 5–8)
PLATELET # BLD AUTO: 424 10(3)UL (ref 150–450)
POTASSIUM SERPL-SCNC: 4.4 MMOL/L (ref 3.5–5.1)
PROT SERPL-MCNC: 8.8 G/DL (ref 6.4–8.2)
PROT UR STRIP.AUTO-MCNC: 30 MG/DL
RBC # BLD AUTO: 6.44 X10(6)UL
RBC UR QL AUTO: NEGATIVE
SODIUM SERPL-SCNC: 128 MMOL/L (ref 136–145)
SP GR UR STRIP.AUTO: 1.02 (ref 1–1.03)
TROPONIN I HIGH SENSITIVITY: 4 NG/L
UROBILINOGEN UR STRIP.AUTO-MCNC: <2 MG/DL
WBC # BLD AUTO: 12.8 X10(3) UL (ref 4–11)

## 2023-06-07 PROCEDURE — 99223 1ST HOSP IP/OBS HIGH 75: CPT | Performed by: HOSPITALIST

## 2023-06-07 PROCEDURE — 74176 CT ABD & PELVIS W/O CONTRAST: CPT | Performed by: EMERGENCY MEDICINE

## 2023-06-07 RX ORDER — HEPARIN SODIUM 5000 [USP'U]/ML
5000 INJECTION, SOLUTION INTRAVENOUS; SUBCUTANEOUS EVERY 12 HOURS SCHEDULED
Status: DISCONTINUED | OUTPATIENT
Start: 2023-06-07 | End: 2023-06-09

## 2023-06-07 RX ORDER — SODIUM CHLORIDE 9 MG/ML
INJECTION, SOLUTION INTRAVENOUS CONTINUOUS
Status: ACTIVE | OUTPATIENT
Start: 2023-06-07 | End: 2023-06-08

## 2023-06-07 RX ORDER — ONDANSETRON 2 MG/ML
4 INJECTION INTRAMUSCULAR; INTRAVENOUS EVERY 6 HOURS PRN
Status: DISCONTINUED | OUTPATIENT
Start: 2023-06-07 | End: 2023-06-09

## 2023-06-07 RX ORDER — ACETAMINOPHEN 500 MG
500 TABLET ORAL EVERY 4 HOURS PRN
Status: DISCONTINUED | OUTPATIENT
Start: 2023-06-07 | End: 2023-06-09

## 2023-06-07 RX ORDER — MELATONIN
3 NIGHTLY PRN
Status: DISCONTINUED | OUTPATIENT
Start: 2023-06-07 | End: 2023-06-09

## 2023-06-07 RX ORDER — FAMOTIDINE 20 MG/1
20 TABLET, FILM COATED ORAL NIGHTLY
Status: DISCONTINUED | OUTPATIENT
Start: 2023-06-07 | End: 2023-06-09

## 2023-06-07 RX ORDER — FAMOTIDINE 10 MG/ML
20 INJECTION, SOLUTION INTRAVENOUS ONCE
Status: COMPLETED | OUTPATIENT
Start: 2023-06-07 | End: 2023-06-07

## 2023-06-07 RX ORDER — ALBUTEROL SULFATE 90 UG/1
2 AEROSOL, METERED RESPIRATORY (INHALATION) EVERY 6 HOURS PRN
Status: DISCONTINUED | OUTPATIENT
Start: 2023-06-07 | End: 2023-06-09

## 2023-06-07 RX ORDER — CYANOCOBALAMIN 1000 UG/ML
1000 INJECTION, SOLUTION INTRAMUSCULAR; SUBCUTANEOUS
Status: DISCONTINUED | OUTPATIENT
Start: 2023-07-07 | End: 2023-06-09

## 2023-06-07 RX ORDER — MORPHINE SULFATE 4 MG/ML
4 INJECTION, SOLUTION INTRAMUSCULAR; INTRAVENOUS EVERY 30 MIN PRN
Status: ACTIVE | OUTPATIENT
Start: 2023-06-07 | End: 2023-06-08

## 2023-06-07 RX ORDER — SODIUM CHLORIDE 9 MG/ML
125 INJECTION, SOLUTION INTRAVENOUS CONTINUOUS
Status: DISCONTINUED | OUTPATIENT
Start: 2023-06-07 | End: 2023-06-09

## 2023-06-07 RX ORDER — ONDANSETRON 2 MG/ML
4 INJECTION INTRAMUSCULAR; INTRAVENOUS ONCE
Status: COMPLETED | OUTPATIENT
Start: 2023-06-07 | End: 2023-06-07

## 2023-06-07 RX ORDER — MELOXICAM 15 MG/1
15 TABLET ORAL DAILY
COMMUNITY

## 2023-06-07 RX ORDER — METOCLOPRAMIDE HYDROCHLORIDE 5 MG/ML
5 INJECTION INTRAMUSCULAR; INTRAVENOUS EVERY 8 HOURS PRN
Status: DISCONTINUED | OUTPATIENT
Start: 2023-06-07 | End: 2023-06-09

## 2023-06-07 RX ORDER — SENNOSIDES 8.6 MG
17.2 TABLET ORAL NIGHTLY PRN
Status: DISCONTINUED | OUTPATIENT
Start: 2023-06-07 | End: 2023-06-09

## 2023-06-07 RX ORDER — BISACODYL 10 MG
10 SUPPOSITORY, RECTAL RECTAL
Status: DISCONTINUED | OUTPATIENT
Start: 2023-06-07 | End: 2023-06-09

## 2023-06-07 RX ORDER — SODIUM CHLORIDE 9 MG/ML
INJECTION, SOLUTION INTRAVENOUS CONTINUOUS
Status: DISCONTINUED | OUTPATIENT
Start: 2023-06-07 | End: 2023-06-09

## 2023-06-07 RX ORDER — POLYETHYLENE GLYCOL 3350 17 G/17G
17 POWDER, FOR SOLUTION ORAL DAILY PRN
Status: DISCONTINUED | OUTPATIENT
Start: 2023-06-07 | End: 2023-06-09

## 2023-06-07 RX ORDER — ONDANSETRON 2 MG/ML
4 INJECTION INTRAMUSCULAR; INTRAVENOUS EVERY 4 HOURS PRN
Status: ACTIVE | OUTPATIENT
Start: 2023-06-07 | End: 2023-06-08

## 2023-06-07 NOTE — ED INITIAL ASSESSMENT (HPI)
Pt states a month ago she was started on a new medication for an infection from podiatry. Pt now c/o decrease appetite, weight loss, difficulty keeping things down, loss of taste/bad taste in her mouth, and weakness.

## 2023-06-08 LAB
ALBUMIN SERPL-MCNC: 2.9 G/DL (ref 3.4–5)
ALBUMIN/GLOB SERPL: 0.9 {RATIO} (ref 1–2)
ALP LIVER SERPL-CCNC: 76 U/L
ALT SERPL-CCNC: 18 U/L
ANION GAP SERPL CALC-SCNC: 7 MMOL/L (ref 0–18)
AST SERPL-CCNC: 17 U/L (ref 15–37)
ATRIAL RATE: 58 BPM
BASOPHILS # BLD AUTO: 0.09 X10(3) UL (ref 0–0.2)
BASOPHILS NFR BLD AUTO: 1 %
BILIRUB SERPL-MCNC: 0.2 MG/DL (ref 0.1–2)
BUN BLD-MCNC: 59 MG/DL (ref 7–18)
CALCIUM BLD-MCNC: 8.6 MG/DL (ref 8.5–10.1)
CHLORIDE SERPL-SCNC: 107 MMOL/L (ref 98–112)
CK SERPL-CCNC: 36 U/L
CO2 SERPL-SCNC: 20 MMOL/L (ref 21–32)
CREAT BLD-MCNC: 1.49 MG/DL
EOSINOPHIL # BLD AUTO: 0.29 X10(3) UL (ref 0–0.7)
EOSINOPHIL NFR BLD AUTO: 3.3 %
ERYTHROCYTE [DISTWIDTH] IN BLOOD BY AUTOMATED COUNT: 13.2 %
GFR SERPLBLD BASED ON 1.73 SQ M-ARVRAT: 37 ML/MIN/1.73M2 (ref 60–?)
GLOBULIN PLAS-MCNC: 3.2 G/DL (ref 2.8–4.4)
GLUCOSE BLD-MCNC: 94 MG/DL (ref 70–99)
HCT VFR BLD AUTO: 41.4 %
HGB BLD-MCNC: 13.9 G/DL
IMM GRANULOCYTES # BLD AUTO: 0.08 X10(3) UL (ref 0–1)
IMM GRANULOCYTES NFR BLD: 0.9 %
LYMPHOCYTES # BLD AUTO: 1.74 X10(3) UL (ref 1–4)
LYMPHOCYTES NFR BLD AUTO: 19.8 %
MAGNESIUM SERPL-MCNC: 1.4 MG/DL (ref 1.6–2.6)
MCH RBC QN AUTO: 28.4 PG (ref 26–34)
MCHC RBC AUTO-ENTMCNC: 33.6 G/DL (ref 31–37)
MCV RBC AUTO: 84.7 FL
MONOCYTES # BLD AUTO: 0.9 X10(3) UL (ref 0.1–1)
MONOCYTES NFR BLD AUTO: 10.3 %
NEUTROPHILS # BLD AUTO: 5.67 X10 (3) UL (ref 1.5–7.7)
NEUTROPHILS # BLD AUTO: 5.67 X10(3) UL (ref 1.5–7.7)
NEUTROPHILS NFR BLD AUTO: 64.7 %
OSMOLALITY SERPL CALC.SUM OF ELEC: 294 MOSM/KG (ref 275–295)
P AXIS: 40 DEGREES
P-R INTERVAL: 132 MS
PLATELET # BLD AUTO: 322 10(3)UL (ref 150–450)
POTASSIUM SERPL-SCNC: 4.4 MMOL/L (ref 3.5–5.1)
PROT SERPL-MCNC: 6.1 G/DL (ref 6.4–8.2)
Q-T INTERVAL: 442 MS
QRS DURATION: 92 MS
QTC CALCULATION (BEZET): 433 MS
R AXIS: 81 DEGREES
RBC # BLD AUTO: 4.89 X10(6)UL
SODIUM SERPL-SCNC: 134 MMOL/L (ref 136–145)
T AXIS: 69 DEGREES
VENTRICULAR RATE: 58 BPM
WBC # BLD AUTO: 8.8 X10(3) UL (ref 4–11)

## 2023-06-08 PROCEDURE — 99232 SBSQ HOSP IP/OBS MODERATE 35: CPT | Performed by: HOSPITALIST

## 2023-06-08 RX ORDER — DIPHENHYDRAMINE HCL 25 MG
25 CAPSULE ORAL ONCE
Status: COMPLETED | OUTPATIENT
Start: 2023-06-08 | End: 2023-06-08

## 2023-06-08 RX ORDER — IBUPROFEN 200 MG
200 TABLET ORAL EVERY 6 HOURS PRN
Status: DISCONTINUED | OUTPATIENT
Start: 2023-06-08 | End: 2023-06-08

## 2023-06-08 RX ORDER — IBUPROFEN 200 MG
200 TABLET ORAL EVERY 8 HOURS PRN
Status: DISCONTINUED | OUTPATIENT
Start: 2023-06-08 | End: 2023-06-09

## 2023-06-08 RX ORDER — CALCIUM CARBONATE 500 MG/1
1000 TABLET, CHEWABLE ORAL ONCE AS NEEDED
Status: COMPLETED | OUTPATIENT
Start: 2023-06-08 | End: 2023-06-08

## 2023-06-08 RX ORDER — MAGNESIUM OXIDE 400 MG/1
800 TABLET ORAL ONCE
Status: COMPLETED | OUTPATIENT
Start: 2023-06-08 | End: 2023-06-08

## 2023-06-08 NOTE — PLAN OF CARE
Patient A&Ox4, RA, up ad ca. IVF infusing per mar. Will receiving IV abx tonight. Voiding. Tolerating regular diet. Receiving prn tums and motrin for epigastric and back pain respectively. Plan of care discussed w/ patient.     Problem: Patient/Family Goals  Goal: Patient/Family Long Term Goal  Description: Patient's Long Term Goal: Discharge home    Interventions:  - IVF   - ambulate as tolerated  - diet as tolerated  - awaiting urine culture results  - See additional Care Plan goals for specific interventions  Outcome: Progressing  Goal: Patient/Family Short Term Goal  Description: Patient's Short Term Goal: Tolerate pain     Interventions:   - prn pain meds  - See additional Care Plan goals for specific interventions  Outcome: Progressing

## 2023-06-08 NOTE — PROGRESS NOTES
[facility] Note:  Renal Dose Adjustment    Nicolette Vines has been prescribed famotidine (PEPCID) 20 mg orally every 12 hours. Estimated Creatinine Clearance: 21 mL/min (A) (based on SCr of 1.85 mg/dL (H)). Calculated creatinine clearance is < 50 ml/min, therefore the dose of famotidine (Pepcid) has been changed to 20 mg orally every 24 hours per P&T approved protocol. Pharmacy will continue to follow, and if renal function improves, will resume the original order.     Thank you,  Indu Schreiber, [facility]  6/7/2023 11:18 PM

## 2023-06-08 NOTE — ED QUICK NOTES
Orders for admission, patient is aware of plan and ready to go upstairs. Any questions, please call ED RN karl at extension [phone].      Patient Covid vaccination status: Fully vaccinated     COVID Test Ordered in ED: None    COVID Suspicion at Admission: N/A    Running Infusions:      Mental Status/LOC at time of transport: aox3    Other pertinent information:   CIWA score: N/A   NIH score:  N/A

## 2023-06-08 NOTE — PROGRESS NOTES
Patient A&Ox4. Vital signs stable on room air. Denies pain at this time. Abdomen soft, nontender - ileostomy clean, dry, intact and draining well. Bowel sounds present; active. Patient passing gas via ileostomy. Denies nausea, vomiting, diarrhea after zofran given prior to arrival on unit. Regular cardiac diet being tolerated; patient explains she has appetite loss and changes in capability to taste food for past few months. Patient updated on plan of care. Questions and concerns discussed.

## 2023-06-09 VITALS
SYSTOLIC BLOOD PRESSURE: 108 MMHG | HEIGHT: 61 IN | BODY MASS INDEX: 22.05 KG/M2 | RESPIRATION RATE: 18 BRPM | TEMPERATURE: 98 F | OXYGEN SATURATION: 99 % | WEIGHT: 116.81 LBS | HEART RATE: 51 BPM | DIASTOLIC BLOOD PRESSURE: 40 MMHG

## 2023-06-09 LAB
ANION GAP SERPL CALC-SCNC: 4 MMOL/L (ref 0–18)
BASOPHILS # BLD AUTO: 0.09 X10(3) UL (ref 0–0.2)
BASOPHILS NFR BLD AUTO: 1.3 %
BUN BLD-MCNC: 34 MG/DL (ref 7–18)
CALCIUM BLD-MCNC: 8.2 MG/DL (ref 8.5–10.1)
CHLORIDE SERPL-SCNC: 111 MMOL/L (ref 98–112)
CO2 SERPL-SCNC: 21 MMOL/L (ref 21–32)
CREAT BLD-MCNC: 1.08 MG/DL
EOSINOPHIL # BLD AUTO: 0.33 X10(3) UL (ref 0–0.7)
EOSINOPHIL NFR BLD AUTO: 4.8 %
ERYTHROCYTE [DISTWIDTH] IN BLOOD BY AUTOMATED COUNT: 13.3 %
GFR SERPLBLD BASED ON 1.73 SQ M-ARVRAT: 55 ML/MIN/1.73M2 (ref 60–?)
GLUCOSE BLD-MCNC: 94 MG/DL (ref 70–99)
HCT VFR BLD AUTO: 41.4 %
HGB BLD-MCNC: 13.5 G/DL
IMM GRANULOCYTES # BLD AUTO: 0.06 X10(3) UL (ref 0–1)
IMM GRANULOCYTES NFR BLD: 0.9 %
LYMPHOCYTES # BLD AUTO: 1.47 X10(3) UL (ref 1–4)
LYMPHOCYTES NFR BLD AUTO: 21.4 %
MAGNESIUM SERPL-MCNC: 1.3 MG/DL (ref 1.6–2.6)
MCH RBC QN AUTO: 28.1 PG (ref 26–34)
MCHC RBC AUTO-ENTMCNC: 32.6 G/DL (ref 31–37)
MCV RBC AUTO: 86.1 FL
MONOCYTES # BLD AUTO: 0.62 X10(3) UL (ref 0.1–1)
MONOCYTES NFR BLD AUTO: 9 %
NEUTROPHILS # BLD AUTO: 4.31 X10 (3) UL (ref 1.5–7.7)
NEUTROPHILS # BLD AUTO: 4.31 X10(3) UL (ref 1.5–7.7)
NEUTROPHILS NFR BLD AUTO: 62.6 %
OSMOLALITY SERPL CALC.SUM OF ELEC: 289 MOSM/KG (ref 275–295)
PLATELET # BLD AUTO: 310 10(3)UL (ref 150–450)
POTASSIUM SERPL-SCNC: 3.6 MMOL/L (ref 3.5–5.1)
RBC # BLD AUTO: 4.81 X10(6)UL
SODIUM SERPL-SCNC: 136 MMOL/L (ref 136–145)
WBC # BLD AUTO: 6.9 X10(3) UL (ref 4–11)

## 2023-06-09 PROCEDURE — 99239 HOSP IP/OBS DSCHRG MGMT >30: CPT | Performed by: HOSPITALIST

## 2023-06-09 RX ORDER — MAGNESIUM OXIDE 400 MG/1
800 TABLET ORAL ONCE
Status: COMPLETED | OUTPATIENT
Start: 2023-06-09 | End: 2023-06-09

## 2023-06-09 NOTE — PLAN OF CARE
Patient alert and oriented x4. Vss, on room air. Denies pain. Self care for ileostomy. Voids. Up ab ca. Request sleep aid. Order for benadryl 25 mg once per Scooby-Nicolás. Plan of care discussed with patient.    Problem: Patient/Family Goals  Goal: Patient/Family Long Term Goal  Description: Patient's Long Term Goal: Discharge home    Interventions:  - IVF   - ambulate as tolerated  - diet as tolerated  - awaiting urine culture results  - See additional Care Plan goals for specific interventions  Outcome: Progressing  Goal: Patient/Family Short Term Goal  Description: Patient's Short Term Goal: Tolerate pain     Interventions:   - prn pain meds  - See additional Care Plan goals for specific interventions  Outcome: Progressing     Problem: PAIN - ADULT  Goal: Verbalizes/displays adequate comfort level or patient's stated pain goal  Description: INTERVENTIONS:  - Encourage pt to monitor pain and request assistance  - Assess pain using appropriate pain scale  - Administer analgesics based on type and severity of pain and evaluate response  - Implement non-pharmacological measures as appropriate and evaluate response  - Consider cultural and social influences on pain and pain management  - Manage/alleviate anxiety  - Utilize distraction and/or relaxation techniques  - Monitor for opioid side effects  - Notify MD/LIP if interventions unsuccessful or patient reports new pain  - Anticipate increased pain with activity and pre-medicate as appropriate  Outcome: Progressing     Problem: RISK FOR INFECTION - ADULT  Goal: Absence of fever/infection during anticipated neutropenic period  Description: INTERVENTIONS  - Monitor WBC  - Administer growth factors as ordered  - Implement neutropenic guidelines  Outcome: Progressing     Problem: SAFETY ADULT - FALL  Goal: Free from fall injury  Description: INTERVENTIONS:  - Assess pt frequently for physical needs  - Identify cognitive and physical deficits and behaviors that affect risk of falls.   - [place] fall precautions as indicated by assessment.  - Educate pt/family on patient safety including physical limitations  - Instruct pt to call for assistance with activity based on assessment  - Modify environment to reduce risk of injury  - Provide assistive devices as appropriate  - Consider OT/PT consult to assist with strengthening/mobility  - Encourage toileting schedule  Outcome: Progressing     Problem: DISCHARGE PLANNING  Goal: Discharge to home or other facility with appropriate resources  Description: INTERVENTIONS:  - Identify barriers to discharge w/pt and caregiver  - Include patient/family/discharge partner in discharge planning  - Arrange for needed discharge resources and transportation as appropriate  - Identify discharge learning needs (meds, wound care, etc)  - Arrange for interpreters to assist at discharge as needed  - Consider post-discharge preferences of patient/family/discharge partner  - Complete POLST form as appropriate  - Assess patient's ability to be responsible for managing their own health  - Refer to Case Management Department for coordinating discharge planning if the patient needs post-hospital services based on physician/LIP order or complex needs related to functional status, cognitive ability or social support system  Outcome: Progressing     Problem: Altered Communication/Language Barrier  Goal: Patient/Family is able to understand and participate in their care  Description: Interventions:  - Assess communication ability and preferred communication style  - Implement communication aides and strategies  - Use visual cues when possible  - Listen attentively, be patient, do not interrupt  - Minimize distractions  - Allow time for understanding and response  - Establish method for patient to ask for assistance (call light)  - Provide an  as needed  - Communicate barriers and strategies to overcome with those who interact with patient  Outcome: Progressing     Problem: GASTROINTESTINAL - ADULT  Goal: Minimal or absence of nausea and vomiting  Description: INTERVENTIONS:  - Maintain adequate hydration with IV or PO as ordered and tolerated  - Nasogastric tube to low intermittent suction as ordered  - Evaluate effectiveness of ordered antiemetic medications  - Provide nonpharmacologic comfort measures as appropriate  - Advance diet as tolerated, if ordered  - Obtain nutritional consult as needed  - Evaluate fluid balance  Outcome: Progressing  Goal: Maintains or returns to baseline bowel function  Description: INTERVENTIONS:  - Assess bowel function  - Maintain adequate hydration with IV or PO as ordered and tolerated  - Evaluate effectiveness of GI medications  - Encourage mobilization and activity  - Obtain nutritional consult as needed  - Establish a toileting routine/schedule  - Consider collaborating with pharmacy to review patient's medication profile  Outcome: Progressing  Goal: Maintains adequate nutritional intake (undernourished)  Description: INTERVENTIONS:  - Monitor percentage of each meal consumed  - Identify factors contributing to decreased intake, treat as appropriate  - Assist with meals as needed  - Monitor I&O, WT and lab values  - Obtain nutritional consult as needed  - Optimize oral hygiene and moisture  - Encourage food from home; allow for food preferences  - Enhance eating environment  Outcome: Progressing  Goal: Achieves appropriate nutritional intake (bariatric)  Description: INTERVENTIONS:  - Monitor for over-consumption  - Identify factors contributing to increased intake, treat as appropriate  - Monitor I&O, WT and lab values  - Obtain nutritional consult as needed  - Evaluate psychosocial factors contributing to over-consumption  Outcome: Progressing

## 2023-06-10 NOTE — PROGRESS NOTES
Patient discharged to home. Discharge instructions reviewed with the patient, and the patient verbalized her understanding of her discharge instructions. The patient ambulated out of the hospital with her .

## 2023-06-13 ENCOUNTER — OFFICE VISIT (OUTPATIENT)
Dept: INTERNAL MEDICINE CLINIC | Facility: CLINIC | Age: 73
End: 2023-06-13
Payer: COMMERCIAL

## 2023-06-13 VITALS
TEMPERATURE: 97 F | DIASTOLIC BLOOD PRESSURE: 76 MMHG | HEART RATE: 78 BPM | RESPIRATION RATE: 16 BRPM | OXYGEN SATURATION: 100 % | SYSTOLIC BLOOD PRESSURE: 124 MMHG | BODY MASS INDEX: 22.47 KG/M2 | HEIGHT: 61 IN | WEIGHT: 119 LBS

## 2023-06-13 DIAGNOSIS — F41.9 ANXIETY: ICD-10-CM

## 2023-06-13 DIAGNOSIS — B35.1 FUNGAL INFECTION OF TOENAIL: ICD-10-CM

## 2023-06-13 DIAGNOSIS — E86.0 DEHYDRATION: Primary | ICD-10-CM

## 2023-06-13 DIAGNOSIS — K50.90 CROHN'S DISEASE WITHOUT COMPLICATION, UNSPECIFIED GASTROINTESTINAL TRACT LOCATION (HCC): ICD-10-CM

## 2023-06-13 DIAGNOSIS — R53.1 WEAKNESS: ICD-10-CM

## 2023-06-13 PROCEDURE — 1111F DSCHRG MED/CURRENT MED MERGE: CPT | Performed by: INTERNAL MEDICINE

## 2023-06-13 PROCEDURE — 3074F SYST BP LT 130 MM HG: CPT | Performed by: INTERNAL MEDICINE

## 2023-06-13 PROCEDURE — 99214 OFFICE O/P EST MOD 30 MIN: CPT | Performed by: INTERNAL MEDICINE

## 2023-06-13 PROCEDURE — 3078F DIAST BP <80 MM HG: CPT | Performed by: INTERNAL MEDICINE

## 2023-06-13 PROCEDURE — 3008F BODY MASS INDEX DOCD: CPT | Performed by: INTERNAL MEDICINE

## 2023-06-13 RX ORDER — ESCITALOPRAM OXALATE 5 MG/1
5 TABLET ORAL DAILY
Qty: 30 TABLET | Refills: 1 | Status: SHIPPED | OUTPATIENT
Start: 2023-06-13

## 2023-06-13 RX ORDER — FAMOTIDINE 20 MG/1
20 TABLET, FILM COATED ORAL DAILY PRN
Qty: 30 TABLET | Refills: 2 | Status: SHIPPED | OUTPATIENT
Start: 2023-06-13

## 2023-06-15 PROBLEM — Z87.448 HISTORY OF ACUTE RENAL FAILURE: Status: ACTIVE | Noted: 2023-06-15

## 2023-06-15 PROBLEM — B35.1 FUNGAL INFECTION OF TOENAIL: Status: ACTIVE | Noted: 2023-06-15

## 2023-06-20 ENCOUNTER — LAB ENCOUNTER (OUTPATIENT)
Dept: LAB | Age: 73
End: 2023-06-20
Attending: INTERNAL MEDICINE
Payer: COMMERCIAL

## 2023-06-20 DIAGNOSIS — R53.1 WEAKNESS: ICD-10-CM

## 2023-06-20 DIAGNOSIS — J44.9 ASTHMA-COPD OVERLAP SYNDROME (HCC): ICD-10-CM

## 2023-06-20 DIAGNOSIS — E86.0 DEHYDRATION: ICD-10-CM

## 2023-06-20 LAB
ALBUMIN SERPL-MCNC: 3.5 G/DL (ref 3.4–5)
ALBUMIN/GLOB SERPL: 0.9 {RATIO} (ref 1–2)
ALP LIVER SERPL-CCNC: 84 U/L
ALT SERPL-CCNC: 25 U/L
ANION GAP SERPL CALC-SCNC: 7 MMOL/L (ref 0–18)
AST SERPL-CCNC: 22 U/L (ref 15–37)
BILIRUB SERPL-MCNC: 0.5 MG/DL (ref 0.1–2)
BUN BLD-MCNC: 38 MG/DL (ref 7–18)
CALCIUM BLD-MCNC: 9.6 MG/DL (ref 8.5–10.1)
CHLORIDE SERPL-SCNC: 105 MMOL/L (ref 98–112)
CO2 SERPL-SCNC: 21 MMOL/L (ref 21–32)
CREAT BLD-MCNC: 1.25 MG/DL
FASTING STATUS PATIENT QL REPORTED: NO
GFR SERPLBLD BASED ON 1.73 SQ M-ARVRAT: 46 ML/MIN/1.73M2 (ref 60–?)
GLOBULIN PLAS-MCNC: 4 G/DL (ref 2.8–4.4)
GLUCOSE BLD-MCNC: 100 MG/DL (ref 70–99)
OSMOLALITY SERPL CALC.SUM OF ELEC: 285 MOSM/KG (ref 275–295)
POTASSIUM SERPL-SCNC: 4 MMOL/L (ref 3.5–5.1)
PROT SERPL-MCNC: 7.5 G/DL (ref 6.4–8.2)
SODIUM SERPL-SCNC: 133 MMOL/L (ref 136–145)

## 2023-06-20 PROCEDURE — 80053 COMPREHEN METABOLIC PANEL: CPT | Performed by: INTERNAL MEDICINE

## 2023-06-21 ENCOUNTER — TELEPHONE (OUTPATIENT)
Dept: INTERNAL MEDICINE CLINIC | Facility: CLINIC | Age: 73
End: 2023-06-21

## 2023-06-21 DIAGNOSIS — Z12.2 SCREENING FOR LUNG CANCER: Primary | ICD-10-CM

## 2023-06-21 DIAGNOSIS — Z87.891 HISTORY OF SMOKING: ICD-10-CM

## 2023-06-21 DIAGNOSIS — N28.9 RENAL INSUFFICIENCY: ICD-10-CM

## 2023-06-21 RX ORDER — ALBUTEROL SULFATE 90 UG/1
2 AEROSOL, METERED RESPIRATORY (INHALATION) EVERY 6 HOURS PRN
Qty: 1 EACH | Refills: 0 | Status: SHIPPED | OUTPATIENT
Start: 2023-06-21

## 2023-06-21 RX ORDER — TIOTROPIUM BROMIDE INHALATION SPRAY 3.12 UG/1
2 SPRAY, METERED RESPIRATORY (INHALATION) DAILY
Qty: 1 EACH | Refills: 0 | Status: SHIPPED | OUTPATIENT
Start: 2023-06-21

## 2023-06-21 NOTE — TELEPHONE ENCOUNTER
Patient is calling and asking for Dr Nicky Polanco to order lung scan.   Patient received information that she is overdue, on my-chart

## 2023-06-21 NOTE — TELEPHONE ENCOUNTER
Asthma & COPD Medication Protocol Failed 06/20/2023 04:59 PM    Asthma Action Score greater than or equal to 20    AAP/ACT given in last 12 months    Appointment in past 6 or next 3 months      Last visit 6/13/23 for hospital follow up     Return in about 6 weeks (around 7/25/2023), or if symptoms worsen or fail to improve, for follow up on new medication for mood. There are no Patient Instructions on file for this visit. No future visits scheduled    Please reach out to patient to schedule follow up   Routing refill request to PCP for review and recommendations.

## 2023-06-22 ENCOUNTER — TELEPHONE (OUTPATIENT)
Dept: INTERNAL MEDICINE CLINIC | Facility: CLINIC | Age: 73
End: 2023-06-22

## 2023-06-22 NOTE — TELEPHONE ENCOUNTER
Called and spoke w/ pt. Notified renal panel ok and can recheck in 2-4 weeks. Notified order is in and to push fluids/water to help with kidney function. Notified pt TB ok w/ pt stopping med. Pt verbalizes understanding and agreeable to plan. Pt very appreciative of call and care we are providing.

## 2023-06-22 NOTE — TELEPHONE ENCOUNTER
Renal panel is ok, we can check renal panel again in 2-4 weeks to make sure values are good.   Push water  Ok to stop medication  I will order another bmp

## 2023-07-12 ENCOUNTER — HOSPITAL ENCOUNTER (OUTPATIENT)
Dept: CT IMAGING | Facility: HOSPITAL | Age: 73
Discharge: HOME | End: 2023-07-12
Attending: INTERNAL MEDICINE
Payer: COMMERCIAL

## 2023-07-12 DIAGNOSIS — Z87.891 HISTORY OF SMOKING: ICD-10-CM

## 2023-07-12 DIAGNOSIS — Z12.2 SCREENING FOR LUNG CANCER: ICD-10-CM

## 2023-07-12 PROCEDURE — 71271 CT THORAX LUNG CANCER SCR C-: CPT | Performed by: INTERNAL MEDICINE

## 2023-07-15 ENCOUNTER — APPOINTMENT (OUTPATIENT)
Dept: CT IMAGING | Facility: HOSPITAL | Age: 73
End: 2023-07-15
Attending: EMERGENCY MEDICINE
Payer: COMMERCIAL

## 2023-07-15 ENCOUNTER — HOSPITAL ENCOUNTER (EMERGENCY)
Facility: HOSPITAL | Age: 73
Discharge: HOME | End: 2023-07-15
Attending: EMERGENCY MEDICINE
Payer: COMMERCIAL

## 2023-07-15 VITALS
HEART RATE: 75 BPM | DIASTOLIC BLOOD PRESSURE: 54 MMHG | SYSTOLIC BLOOD PRESSURE: 110 MMHG | TEMPERATURE: 97 F | RESPIRATION RATE: 16 BRPM | OXYGEN SATURATION: 98 %

## 2023-07-15 DIAGNOSIS — R13.10 DYSPHAGIA, UNSPECIFIED TYPE: ICD-10-CM

## 2023-07-15 DIAGNOSIS — R13.10 ODYNOPHAGIA: Primary | ICD-10-CM

## 2023-07-15 LAB
ALBUMIN SERPL-MCNC: 3.3 G/DL (ref 3.4–5)
ALBUMIN/GLOB SERPL: 0.7 {RATIO} (ref 1–2)
ALP LIVER SERPL-CCNC: 90 U/L
ALT SERPL-CCNC: 14 U/L
ANION GAP SERPL CALC-SCNC: 11 MMOL/L (ref 0–18)
AST SERPL-CCNC: 18 U/L (ref 15–37)
ATRIAL RATE: 66 BPM
BASOPHILS # BLD AUTO: 0.1 X10(3) UL (ref 0–0.2)
BASOPHILS NFR BLD AUTO: 0.7 %
BILIRUB SERPL-MCNC: 0.4 MG/DL (ref 0.1–2)
BILIRUB UR QL STRIP.AUTO: NEGATIVE
BUN BLD-MCNC: 45 MG/DL (ref 7–18)
CALCIUM BLD-MCNC: 9.5 MG/DL (ref 8.5–10.1)
CHLORIDE SERPL-SCNC: 104 MMOL/L (ref 98–112)
CO2 SERPL-SCNC: 18 MMOL/L (ref 21–32)
COLOR UR AUTO: YELLOW
CREAT BLD-MCNC: 1.35 MG/DL
EOSINOPHIL # BLD AUTO: 0.14 X10(3) UL (ref 0–0.7)
EOSINOPHIL NFR BLD AUTO: 0.9 %
ERYTHROCYTE [DISTWIDTH] IN BLOOD BY AUTOMATED COUNT: 13.2 %
GFR SERPLBLD BASED ON 1.73 SQ M-ARVRAT: 42 ML/MIN/1.73M2 (ref 60–?)
GLOBULIN PLAS-MCNC: 4.5 G/DL (ref 2.8–4.4)
GLUCOSE BLD-MCNC: 146 MG/DL (ref 70–99)
GLUCOSE UR STRIP.AUTO-MCNC: NEGATIVE MG/DL
HCT VFR BLD AUTO: 49.4 %
HGB BLD-MCNC: 16.4 G/DL
HYALINE CASTS #/AREA URNS AUTO: PRESENT /LPF
IMM GRANULOCYTES # BLD AUTO: 0.2 X10(3) UL (ref 0–1)
IMM GRANULOCYTES NFR BLD: 1.3 %
KETONES UR STRIP.AUTO-MCNC: NEGATIVE MG/DL
LIPASE SERPL-CCNC: 74 U/L (ref 13–75)
LYMPHOCYTES # BLD AUTO: 1.25 X10(3) UL (ref 1–4)
LYMPHOCYTES NFR BLD AUTO: 8.3 %
MCH RBC QN AUTO: 27.3 PG (ref 26–34)
MCHC RBC AUTO-ENTMCNC: 33.2 G/DL (ref 31–37)
MCV RBC AUTO: 82.3 FL
MONOCYTES # BLD AUTO: 1.21 X10(3) UL (ref 0.1–1)
MONOCYTES NFR BLD AUTO: 8 %
NEUTROPHILS # BLD AUTO: 12.2 X10 (3) UL (ref 1.5–7.7)
NEUTROPHILS # BLD AUTO: 12.2 X10(3) UL (ref 1.5–7.7)
NEUTROPHILS NFR BLD AUTO: 80.8 %
NITRITE UR QL STRIP.AUTO: NEGATIVE
OSMOLALITY SERPL CALC.SUM OF ELEC: 290 MOSM/KG (ref 275–295)
P AXIS: 39 DEGREES
P-R INTERVAL: 124 MS
PH UR STRIP.AUTO: 5 [PH] (ref 5–8)
PLATELET # BLD AUTO: 374 10(3)UL (ref 150–450)
POTASSIUM SERPL-SCNC: 4 MMOL/L (ref 3.5–5.1)
PROT SERPL-MCNC: 7.8 G/DL (ref 6.4–8.2)
PROT UR STRIP.AUTO-MCNC: 30 MG/DL
Q-T INTERVAL: 392 MS
QRS DURATION: 82 MS
QTC CALCULATION (BEZET): 410 MS
R AXIS: 85 DEGREES
RBC # BLD AUTO: 6 X10(6)UL
RBC UR QL AUTO: NEGATIVE
SODIUM SERPL-SCNC: 133 MMOL/L (ref 136–145)
SP GR UR STRIP.AUTO: 1.02 (ref 1–1.03)
T AXIS: 64 DEGREES
TROPONIN I HIGH SENSITIVITY: 3 NG/L
UROBILINOGEN UR STRIP.AUTO-MCNC: <2 MG/DL
VENTRICULAR RATE: 66 BPM
WBC # BLD AUTO: 15.1 X10(3) UL (ref 4–11)

## 2023-07-15 PROCEDURE — 96360 HYDRATION IV INFUSION INIT: CPT

## 2023-07-15 PROCEDURE — 99284 EMERGENCY DEPT VISIT MOD MDM: CPT

## 2023-07-15 PROCEDURE — 74176 CT ABD & PELVIS W/O CONTRAST: CPT | Performed by: EMERGENCY MEDICINE

## 2023-07-15 PROCEDURE — 81001 URINALYSIS AUTO W/SCOPE: CPT | Performed by: EMERGENCY MEDICINE

## 2023-07-15 PROCEDURE — 83690 ASSAY OF LIPASE: CPT | Performed by: EMERGENCY MEDICINE

## 2023-07-15 PROCEDURE — 87086 URINE CULTURE/COLONY COUNT: CPT | Performed by: EMERGENCY MEDICINE

## 2023-07-15 PROCEDURE — 85025 COMPLETE CBC W/AUTO DIFF WBC: CPT | Performed by: EMERGENCY MEDICINE

## 2023-07-15 PROCEDURE — 93005 ELECTROCARDIOGRAM TRACING: CPT

## 2023-07-15 PROCEDURE — 80053 COMPREHEN METABOLIC PANEL: CPT | Performed by: EMERGENCY MEDICINE

## 2023-07-15 PROCEDURE — 84484 ASSAY OF TROPONIN QUANT: CPT | Performed by: EMERGENCY MEDICINE

## 2023-07-15 PROCEDURE — 81001 URINALYSIS AUTO W/SCOPE: CPT

## 2023-07-15 PROCEDURE — 93010 ELECTROCARDIOGRAM REPORT: CPT

## 2023-07-15 RX ORDER — OMEPRAZOLE 40 MG/1
40 CAPSULE, DELAYED RELEASE ORAL DAILY
Qty: 30 CAPSULE | Refills: 0 | Status: SHIPPED | OUTPATIENT
Start: 2023-07-15 | End: 2023-07-19

## 2023-07-15 NOTE — ED INITIAL ASSESSMENT (HPI)
C/o \"esophagus\" pain where food and water get stuck for past month  States feels like its constricting. States she is having discomfort even with liquids when swallow. States food then gets \"stuck\" in her mid epigastric area    Handling own secretions in triage.

## 2023-07-15 NOTE — DISCHARGE INSTRUCTIONS
Recommend outpatient scope of your esophagus. Please call Dr. Geeta Jameson office on Monday to follow-up. I did send him an email through our electronic records to expedite your follow-up.   Let the office know you were seen in the ED and need a scope    Begin Prilosec

## 2023-07-18 ENCOUNTER — OFFICE VISIT (OUTPATIENT)
Dept: INTERNAL MEDICINE CLINIC | Facility: CLINIC | Age: 73
End: 2023-07-18
Payer: COMMERCIAL

## 2023-07-18 ENCOUNTER — LAB ENCOUNTER (OUTPATIENT)
Dept: LAB | Facility: HOSPITAL | Age: 73
End: 2023-07-18
Attending: PHYSICIAN ASSISTANT
Payer: COMMERCIAL

## 2023-07-18 VITALS
HEIGHT: 61 IN | TEMPERATURE: 98 F | BODY MASS INDEX: 21.14 KG/M2 | WEIGHT: 112 LBS | HEART RATE: 82 BPM | SYSTOLIC BLOOD PRESSURE: 100 MMHG | DIASTOLIC BLOOD PRESSURE: 64 MMHG

## 2023-07-18 DIAGNOSIS — K13.79 MOUTH PAIN: ICD-10-CM

## 2023-07-18 DIAGNOSIS — R10.9 ABDOMINAL PAIN, UNSPECIFIED ABDOMINAL LOCATION: ICD-10-CM

## 2023-07-18 DIAGNOSIS — R13.10 DYSPHAGIA, UNSPECIFIED TYPE: ICD-10-CM

## 2023-07-18 DIAGNOSIS — R06.02 SOB (SHORTNESS OF BREATH): Primary | ICD-10-CM

## 2023-07-18 DIAGNOSIS — R06.02 SOB (SHORTNESS OF BREATH): ICD-10-CM

## 2023-07-18 DIAGNOSIS — R63.4 WEIGHT LOSS: ICD-10-CM

## 2023-07-18 LAB
ANION GAP SERPL CALC-SCNC: 8 MMOL/L (ref 0–18)
APPEARANCE: CLEAR
ATRIAL RATE: 85 BPM
BASOPHILS # BLD AUTO: 0.11 X10(3) UL (ref 0–0.2)
BASOPHILS NFR BLD AUTO: 0.9 %
BILIRUBIN: NEGATIVE
BUN BLD-MCNC: 46 MG/DL (ref 7–18)
CALCIUM BLD-MCNC: 9.7 MG/DL (ref 8.5–10.1)
CHLORIDE SERPL-SCNC: 104 MMOL/L (ref 98–112)
CO2 SERPL-SCNC: 20 MMOL/L (ref 21–32)
CREAT BLD-MCNC: 1.53 MG/DL
CRP SERPL-MCNC: 2.07 MG/DL (ref ?–0.3)
D DIMER PPP FEU-MCNC: 0.35 UG/ML FEU (ref ?–0.72)
EOSINOPHIL # BLD AUTO: 0.14 X10(3) UL (ref 0–0.7)
EOSINOPHIL NFR BLD AUTO: 1.1 %
ERYTHROCYTE [DISTWIDTH] IN BLOOD BY AUTOMATED COUNT: 13.1 %
ERYTHROCYTE [SEDIMENTATION RATE] IN BLOOD: 61 MM/HR
FASTING STATUS PATIENT QL REPORTED: NO
GFR SERPLBLD BASED ON 1.73 SQ M-ARVRAT: 36 ML/MIN/1.73M2 (ref 60–?)
GLUCOSE (URINE DIPSTICK): NEGATIVE MG/DL
GLUCOSE BLD-MCNC: 113 MG/DL (ref 70–99)
HCT VFR BLD AUTO: 48.7 %
HGB BLD-MCNC: 16.3 G/DL
IMM GRANULOCYTES # BLD AUTO: 0.26 X10(3) UL (ref 0–1)
IMM GRANULOCYTES NFR BLD: 2.1 %
KETONES (URINE DIPSTICK): NEGATIVE MG/DL
LEUKOCYTES: NEGATIVE
LYMPHOCYTES # BLD AUTO: 1.85 X10(3) UL (ref 1–4)
LYMPHOCYTES NFR BLD AUTO: 14.6 %
MCH RBC QN AUTO: 27.8 PG (ref 26–34)
MCHC RBC AUTO-ENTMCNC: 33.5 G/DL (ref 31–37)
MCV RBC AUTO: 83 FL
MONOCYTES # BLD AUTO: 1.31 X10(3) UL (ref 0.1–1)
MONOCYTES NFR BLD AUTO: 10.4 %
MULTISTIX LOT#: ABNORMAL NUMERIC
NEUTROPHILS # BLD AUTO: 8.97 X10 (3) UL (ref 1.5–7.7)
NEUTROPHILS # BLD AUTO: 8.97 X10(3) UL (ref 1.5–7.7)
NEUTROPHILS NFR BLD AUTO: 70.9 %
NITRITE, URINE: NEGATIVE
OSMOLALITY SERPL CALC.SUM OF ELEC: 287 MOSM/KG (ref 275–295)
P AXIS: 72 DEGREES
P-R INTERVAL: 122 MS
PH, URINE: 5.5 (ref 4.5–8)
PLATELET # BLD AUTO: 419 10(3)UL (ref 150–450)
POTASSIUM SERPL-SCNC: 4.6 MMOL/L (ref 3.5–5.1)
PROTEIN (URINE DIPSTICK): 100 MG/DL
Q-T INTERVAL: 364 MS
QRS DURATION: 82 MS
QTC CALCULATION (BEZET): 433 MS
R AXIS: 79 DEGREES
RBC # BLD AUTO: 5.87 X10(6)UL
SODIUM SERPL-SCNC: 132 MMOL/L (ref 136–145)
SPECIFIC GRAVITY: 1.02 (ref 1–1.03)
T AXIS: 66 DEGREES
TSI SER-ACNC: 2.17 MIU/ML (ref 0.36–3.74)
URINE-COLOR: YELLOW
UROBILINOGEN,SEMI-QN: 0.2 MG/DL (ref 0–1.9)
VENTRICULAR RATE: 85 BPM
VIT B12 SERPL-MCNC: 425 PG/ML (ref 193–986)
WBC # BLD AUTO: 12.6 X10(3) UL (ref 4–11)

## 2023-07-18 PROCEDURE — 85379 FIBRIN DEGRADATION QUANT: CPT

## 2023-07-18 PROCEDURE — 85652 RBC SED RATE AUTOMATED: CPT

## 2023-07-18 PROCEDURE — 99214 OFFICE O/P EST MOD 30 MIN: CPT | Performed by: PHYSICIAN ASSISTANT

## 2023-07-18 PROCEDURE — 93000 ELECTROCARDIOGRAM COMPLETE: CPT | Performed by: PHYSICIAN ASSISTANT

## 2023-07-18 PROCEDURE — 3078F DIAST BP <80 MM HG: CPT | Performed by: PHYSICIAN ASSISTANT

## 2023-07-18 PROCEDURE — 36415 COLL VENOUS BLD VENIPUNCTURE: CPT

## 2023-07-18 PROCEDURE — 84443 ASSAY THYROID STIM HORMONE: CPT

## 2023-07-18 PROCEDURE — 3008F BODY MASS INDEX DOCD: CPT | Performed by: PHYSICIAN ASSISTANT

## 2023-07-18 PROCEDURE — 85025 COMPLETE CBC W/AUTO DIFF WBC: CPT

## 2023-07-18 PROCEDURE — 86140 C-REACTIVE PROTEIN: CPT

## 2023-07-18 PROCEDURE — 80048 BASIC METABOLIC PNL TOTAL CA: CPT

## 2023-07-18 PROCEDURE — 3074F SYST BP LT 130 MM HG: CPT | Performed by: PHYSICIAN ASSISTANT

## 2023-07-18 PROCEDURE — 82607 VITAMIN B-12: CPT

## 2023-07-18 PROCEDURE — 81003 URINALYSIS AUTO W/O SCOPE: CPT | Performed by: PHYSICIAN ASSISTANT

## 2023-07-18 NOTE — PATIENT INSTRUCTIONS
Your EKG looks ok today   Complete labs to further evaluate your white blood cell count, kidney function, as well as to rule out a blood clot and look for a reason for your mouth pain   You can use prescription mouth rinse as needed. Swish in your mouth and then spit.

## 2023-07-20 ENCOUNTER — PATIENT MESSAGE (OUTPATIENT)
Dept: INTERNAL MEDICINE CLINIC | Facility: CLINIC | Age: 73
End: 2023-07-20

## 2023-07-20 DIAGNOSIS — D72.829 LEUKOCYTOSIS, UNSPECIFIED TYPE: ICD-10-CM

## 2023-07-20 DIAGNOSIS — N28.9 RENAL INSUFFICIENCY: Primary | ICD-10-CM

## 2023-07-20 NOTE — TELEPHONE ENCOUNTER
From: Dany Schreiber  To: Coy Hancock PA-C  Sent: 7/20/2023 1:38 PM CDT  Subject: Smoking    Hello Again, Lia. I'm realizing that at least some of this heavy mucous might be coming from quitting smoking. Taking it day to day. Thanks again.   [place]ia France

## 2023-07-21 ENCOUNTER — LAB ENCOUNTER (OUTPATIENT)
Dept: LAB | Facility: HOSPITAL | Age: 73
End: 2023-07-21
Payer: COMMERCIAL

## 2023-07-21 ENCOUNTER — TELEPHONE (OUTPATIENT)
Dept: INTERNAL MEDICINE CLINIC | Facility: CLINIC | Age: 73
End: 2023-07-21

## 2023-07-21 DIAGNOSIS — K50.019 CROHN'S DISEASE OF SMALL INTESTINE WITH COMPLICATION (HCC): ICD-10-CM

## 2023-07-21 DIAGNOSIS — K52.9 ENTERITIS: ICD-10-CM

## 2023-07-21 DIAGNOSIS — R63.4 WEIGHT LOSS: ICD-10-CM

## 2023-07-21 DIAGNOSIS — Z93.2 ILEOSTOMY STATUS (HCC): ICD-10-CM

## 2023-07-21 LAB
CRYPTOSP AG STL QL IA: NEGATIVE
G LAMBLIA AG STL QL IA: NEGATIVE

## 2023-07-21 PROCEDURE — 87045 FECES CULTURE AEROBIC BACT: CPT

## 2023-07-21 PROCEDURE — 83993 ASSAY FOR CALPROTECTIN FECAL: CPT

## 2023-07-21 PROCEDURE — 87272 CRYPTOSPORIDIUM AG IF: CPT

## 2023-07-21 PROCEDURE — 89055 LEUKOCYTE ASSESSMENT FECAL: CPT

## 2023-07-21 PROCEDURE — 87046 STOOL CULTR AEROBIC BACT EA: CPT

## 2023-07-21 PROCEDURE — 87427 SHIGA-LIKE TOXIN AG IA: CPT

## 2023-07-21 PROCEDURE — 87493 C DIFF AMPLIFIED PROBE: CPT

## 2023-07-21 PROCEDURE — 87329 GIARDIA AG IA: CPT

## 2023-07-21 NOTE — TELEPHONE ENCOUNTER
Patient called back and after speaking with Dr. Bill Restrepo office, they will take care of her Ostomy supplies.

## 2023-07-21 NOTE — TELEPHONE ENCOUNTER
From: Amira Espitia  To: Kali Beebe PA-C  Sent: 7/20/2023 12:33 PM CDT  Subject: Diogo Vieira. Just an update. I had an appt. with Itzel Vásquez, [id number] yesterday, July 19, 2023. We went over my issues and from that meeting and her direction I scheduled an exam of small intestine and esophagus to be done at same time. Those tests could not be scheduled until November 18, 2023 which really frustrates me. I could have taken November 8, 2023 but that would have been for an appt. after 2:00 and I just didn't feel like drinking a half gallon of \"contrast\" the evening before and having to wait until next day in afternoon. Luckily, the Nov. 18 test time is early morning. That all said. ..at time of my appt. with Devendra Carreon, I was feeling better then and since that morning. That changed in afternoon. I had another miserable afternoon/night of throwing up large amounts of mucus and becoming very weak again. Now have Miracle Mouthwash and ready to use later today. I am still feeling lousy and if situation doesn't improve I just don't know how to help myself. I don't know how I can live like this for another 4 months to get tests. As I said. Lizbet Solares I just pray things improve. Thanks for listening, and I appreciate your kindness and support.     Narendra Casillas  0-

## 2023-07-21 NOTE — TELEPHONE ENCOUNTER
Patient states that [facility] [employer] contacted patient and told her to contact her PCP about refilling her Ostomy supplies. We do not do this and she said that the refill was sent to her Gastroenterologist,  Dr. Sarah Rosenbaum and denied. They do have a message into Dr. Sarah Rosenbaum but told patient to contact her PCP in case we receive paperwork from them too because they were going to fax the paperwork to us too.

## 2023-07-23 LAB — C DIFF TOX B STL QL: NEGATIVE

## 2023-07-24 LAB — CALPROTECTIN STL-MCNT: 80.6 ΜG/G (ref ?–50)

## 2023-07-26 NOTE — TELEPHONE ENCOUNTER
Patient states she is feeling a bit better, keeping fluids down and eating meals. Pt notified ER warnings. Pt asked to complete CBC BMP this week, labs ordered. Pt verbalizes understanding.

## 2023-07-26 NOTE — TELEPHONE ENCOUNTER
Noted. Recommend ER if she worsens and remains unable to tolerate po fluids. Recommend rechecking bmp this week as creatinine has been higher than her baseline.

## 2023-07-27 ENCOUNTER — LAB ENCOUNTER (OUTPATIENT)
Dept: LAB | Age: 73
End: 2023-07-27
Attending: PHYSICIAN ASSISTANT
Payer: COMMERCIAL

## 2023-07-27 DIAGNOSIS — D72.829 LEUKOCYTOSIS, UNSPECIFIED TYPE: ICD-10-CM

## 2023-07-27 DIAGNOSIS — N28.9 RENAL INSUFFICIENCY: ICD-10-CM

## 2023-07-27 LAB
ANION GAP SERPL CALC-SCNC: 6 MMOL/L (ref 0–18)
BASOPHILS # BLD: 0.12 X10(3) UL (ref 0–0.2)
BASOPHILS NFR BLD: 1 %
BUN BLD-MCNC: 36 MG/DL (ref 7–18)
CALCIUM BLD-MCNC: 9 MG/DL (ref 8.5–10.1)
CHLORIDE SERPL-SCNC: 107 MMOL/L (ref 98–112)
CO2 SERPL-SCNC: 20 MMOL/L (ref 21–32)
CREAT BLD-MCNC: 1.27 MG/DL
EGFRCR SERPLBLD CKD-EPI 2021: 45 ML/MIN/1.73M2 (ref 60–?)
EOSINOPHIL # BLD: 0.12 X10(3) UL (ref 0–0.7)
EOSINOPHIL NFR BLD: 1 %
ERYTHROCYTE [DISTWIDTH] IN BLOOD BY AUTOMATED COUNT: 14 %
FASTING STATUS PATIENT QL REPORTED: YES
GLUCOSE BLD-MCNC: 114 MG/DL (ref 70–99)
HCT VFR BLD AUTO: 47 %
HGB BLD-MCNC: 15.4 G/DL
LYMPHOCYTES NFR BLD: 1.73 X10(3) UL (ref 1–4)
LYMPHOCYTES NFR BLD: 15 %
MCH RBC QN AUTO: 27.6 PG (ref 26–34)
MCHC RBC AUTO-ENTMCNC: 32.8 G/DL (ref 31–37)
MCV RBC AUTO: 84.4 FL
METAMYELOCYTES # BLD: 0.12 X10(3) UL
METAMYELOCYTES NFR BLD: 1 %
MONOCYTES # BLD: 1.27 X10(3) UL (ref 0.1–1)
MONOCYTES NFR BLD: 11 %
NEUTROPHILS # BLD AUTO: 7.48 X10 (3) UL (ref 1.5–7.7)
NEUTROPHILS NFR BLD: 71 %
NEUTS HYPERSEG # BLD: 8.17 X10(3) UL (ref 1.5–7.7)
OSMOLALITY SERPL CALC.SUM OF ELEC: 285 MOSM/KG (ref 275–295)
PLATELET # BLD AUTO: 437 10(3)UL (ref 150–450)
PLATELET MORPHOLOGY: NORMAL
POTASSIUM SERPL-SCNC: 4.5 MMOL/L (ref 3.5–5.1)
RBC # BLD AUTO: 5.57 X10(6)UL
SODIUM SERPL-SCNC: 133 MMOL/L (ref 136–145)
TOTAL CELLS COUNTED BLD: 100
WBC # BLD AUTO: 11.5 X10(3) UL (ref 4–11)

## 2023-07-27 PROCEDURE — 80048 BASIC METABOLIC PNL TOTAL CA: CPT | Performed by: PHYSICIAN ASSISTANT

## 2023-07-27 PROCEDURE — 85027 COMPLETE CBC AUTOMATED: CPT | Performed by: PHYSICIAN ASSISTANT

## 2023-07-27 PROCEDURE — 85007 BL SMEAR W/DIFF WBC COUNT: CPT | Performed by: PHYSICIAN ASSISTANT

## 2023-08-01 DIAGNOSIS — D72.829 LEUKOCYTOSIS, UNSPECIFIED TYPE: ICD-10-CM

## 2023-08-01 DIAGNOSIS — N28.9 RENAL INSUFFICIENCY: Primary | ICD-10-CM

## 2023-08-22 ENCOUNTER — TELEPHONE (OUTPATIENT)
Dept: INTERNAL MEDICINE CLINIC | Facility: CLINIC | Age: 73
End: 2023-08-22

## 2023-08-22 NOTE — TELEPHONE ENCOUNTER
PSR:  Do you know who pt has an appt with on 9/19/23 or which Derms she has called to try to schedule. Pt [place] [employer] [place] and should be able to schedule with most anyone with her insurance.

## 2023-08-22 NOTE — TELEPHONE ENCOUNTER
Pt stated she is trying to get in to see a derm right away for a rash on her face that is getting worse. She stated the sooner she was able to get an appt is 9/19/23 she doesn't want this to get worse so she called to see who CS recommends? In the meantime she stated she is going to call around to see who she can get in to see right away. She would still like a call back.

## 2023-08-23 NOTE — TELEPHONE ENCOUNTER
Spoke with patient; patient did try other Providers and this was the soonest appointment she could get. Patient verbalized understanding and appreciated the return call.

## 2023-08-24 ENCOUNTER — PATIENT MESSAGE (OUTPATIENT)
Dept: INTERNAL MEDICINE CLINIC | Facility: CLINIC | Age: 73
End: 2023-08-24

## 2023-08-25 PROBLEM — R13.10 DYSPHAGIA: Status: ACTIVE | Noted: 2023-08-25

## 2023-08-25 PROBLEM — K50.80 REGIONAL ENTERITIS OF SMALL INTESTINE WITH LARGE INTESTINE (HCC): Status: ACTIVE | Noted: 2023-08-25

## 2023-08-25 PROBLEM — R13.10 ODYNOPHAGIA: Status: ACTIVE | Noted: 2023-08-25

## 2023-08-26 NOTE — TELEPHONE ENCOUNTER
From: Dany Schreiber  To: Coy Hancock PA-C  Sent: 8/24/2023 11:13 AM CDT  Subject: Blood Work    Hello  Just remembered My Chart shows to follow up with new blood work around August 25. Please let me know if I need to schedule. Thanks, Clarice Bamberger.   [place]ia France

## 2023-09-05 ENCOUNTER — LAB ENCOUNTER (OUTPATIENT)
Dept: LAB | Age: 73
End: 2023-09-05
Attending: PHYSICIAN ASSISTANT
Payer: COMMERCIAL

## 2023-09-05 DIAGNOSIS — N28.9 RENAL INSUFFICIENCY: ICD-10-CM

## 2023-09-05 DIAGNOSIS — D72.829 LEUKOCYTOSIS, UNSPECIFIED TYPE: ICD-10-CM

## 2023-09-05 LAB
ANION GAP SERPL CALC-SCNC: 7 MMOL/L (ref 0–18)
BASOPHILS # BLD AUTO: 0.09 X10(3) UL (ref 0–0.2)
BASOPHILS NFR BLD AUTO: 1 %
BUN BLD-MCNC: 22 MG/DL (ref 7–18)
CALCIUM BLD-MCNC: 9 MG/DL (ref 8.5–10.1)
CHLORIDE SERPL-SCNC: 110 MMOL/L (ref 98–112)
CO2 SERPL-SCNC: 21 MMOL/L (ref 21–32)
CREAT BLD-MCNC: 1.12 MG/DL
EGFRCR SERPLBLD CKD-EPI 2021: 52 ML/MIN/1.73M2 (ref 60–?)
EOSINOPHIL # BLD AUTO: 0.25 X10(3) UL (ref 0–0.7)
EOSINOPHIL NFR BLD AUTO: 2.9 %
ERYTHROCYTE [DISTWIDTH] IN BLOOD BY AUTOMATED COUNT: 15.1 %
FASTING STATUS PATIENT QL REPORTED: YES
GLUCOSE BLD-MCNC: 102 MG/DL (ref 70–99)
HCT VFR BLD AUTO: 42.3 %
HGB BLD-MCNC: 13 G/DL
IMM GRANULOCYTES # BLD AUTO: 0.23 X10(3) UL (ref 0–1)
IMM GRANULOCYTES NFR BLD: 2.7 %
LYMPHOCYTES # BLD AUTO: 1.9 X10(3) UL (ref 1–4)
LYMPHOCYTES NFR BLD AUTO: 22.1 %
MCH RBC QN AUTO: 27.5 PG (ref 26–34)
MCHC RBC AUTO-ENTMCNC: 30.7 G/DL (ref 31–37)
MCV RBC AUTO: 89.6 FL
MONOCYTES # BLD AUTO: 0.83 X10(3) UL (ref 0.1–1)
MONOCYTES NFR BLD AUTO: 9.7 %
NEUTROPHILS # BLD AUTO: 5.3 X10 (3) UL (ref 1.5–7.7)
NEUTROPHILS # BLD AUTO: 5.3 X10(3) UL (ref 1.5–7.7)
NEUTROPHILS NFR BLD AUTO: 61.6 %
OSMOLALITY SERPL CALC.SUM OF ELEC: 290 MOSM/KG (ref 275–295)
PLATELET # BLD AUTO: 306 10(3)UL (ref 150–450)
POTASSIUM SERPL-SCNC: 3.9 MMOL/L (ref 3.5–5.1)
RBC # BLD AUTO: 4.72 X10(6)UL
SODIUM SERPL-SCNC: 138 MMOL/L (ref 136–145)
WBC # BLD AUTO: 8.6 X10(3) UL (ref 4–11)

## 2023-09-05 PROCEDURE — 85025 COMPLETE CBC W/AUTO DIFF WBC: CPT | Performed by: PHYSICIAN ASSISTANT

## 2023-09-05 PROCEDURE — 80048 BASIC METABOLIC PNL TOTAL CA: CPT | Performed by: INTERNAL MEDICINE

## 2023-09-08 ENCOUNTER — TELEPHONE (OUTPATIENT)
Dept: INTERNAL MEDICINE CLINIC | Facility: CLINIC | Age: 73
End: 2023-09-08

## 2023-09-08 NOTE — TELEPHONE ENCOUNTER
Future Appointments   Date Time Provider [place] Janei   11/20/2023  3:40 PM Mando Cotter MD [facility]     Informed must fast no call back required.  Orders to Zaira Garcia

## 2023-09-20 ENCOUNTER — LAB ENCOUNTER (OUTPATIENT)
Dept: LAB | Age: 73
End: 2023-09-20
Attending: DERMATOLOGY
Payer: COMMERCIAL

## 2023-09-20 DIAGNOSIS — L24.9 IRRITANT DERMATITIS: Primary | ICD-10-CM

## 2023-09-20 PROCEDURE — 86225 DNA ANTIBODY NATIVE: CPT

## 2023-09-20 PROCEDURE — 86038 ANTINUCLEAR ANTIBODIES: CPT

## 2023-09-20 PROCEDURE — 36415 COLL VENOUS BLD VENIPUNCTURE: CPT

## 2023-09-21 LAB
DSDNA IGG SERPL IA-ACNC: <0.6 IU/ML
ENA AB SER QL IA: <0.09 UG/L
ENA AB SER QL IA: NEGATIVE

## 2023-10-23 ENCOUNTER — PATIENT MESSAGE (OUTPATIENT)
Dept: INTERNAL MEDICINE CLINIC | Facility: CLINIC | Age: 73
End: 2023-10-23

## 2023-10-23 DIAGNOSIS — Z12.31 ENCOUNTER FOR SCREENING MAMMOGRAM FOR MALIGNANT NEOPLASM OF BREAST: Primary | ICD-10-CM

## 2023-10-24 NOTE — TELEPHONE ENCOUNTER
From: Park Check  To: Vinny Vazquez  Sent: 10/23/2023 10:21 AM CDT  Subject: [place]. My Chart showing time for my mammogram. Will you please schedule a work order? I appreciate.     Monisha Judge  [phone]

## 2023-11-17 ENCOUNTER — LAB ENCOUNTER (OUTPATIENT)
Dept: LAB | Age: 73
End: 2023-11-17
Attending: INTERNAL MEDICINE
Payer: COMMERCIAL

## 2023-11-17 DIAGNOSIS — Z13.228 SCREENING FOR ENDOCRINE, METABOLIC, AND IMMUNITY DISORDER: ICD-10-CM

## 2023-11-17 DIAGNOSIS — Z13.0 SCREENING FOR ENDOCRINE, METABOLIC, AND IMMUNITY DISORDER: ICD-10-CM

## 2023-11-17 DIAGNOSIS — Z13.29 SCREENING FOR THYROID DISORDER: ICD-10-CM

## 2023-11-17 DIAGNOSIS — N28.9 RENAL INSUFFICIENCY: ICD-10-CM

## 2023-11-17 DIAGNOSIS — Z13.29 SCREENING FOR ENDOCRINE, METABOLIC, AND IMMUNITY DISORDER: ICD-10-CM

## 2023-11-17 DIAGNOSIS — Z13.220 SCREENING FOR LIPID DISORDERS: ICD-10-CM

## 2023-11-17 LAB
ALBUMIN SERPL-MCNC: 3.3 G/DL (ref 3.4–5)
ALBUMIN/GLOB SERPL: 0.9 {RATIO} (ref 1–2)
ALP LIVER SERPL-CCNC: 97 U/L
ALT SERPL-CCNC: 12 U/L
ANION GAP SERPL CALC-SCNC: 9 MMOL/L (ref 0–18)
AST SERPL-CCNC: 20 U/L (ref 15–37)
BILIRUB SERPL-MCNC: 0.5 MG/DL (ref 0.1–2)
BUN BLD-MCNC: 24 MG/DL (ref 9–23)
CALCIUM BLD-MCNC: 9.3 MG/DL (ref 8.5–10.1)
CHLORIDE SERPL-SCNC: 103 MMOL/L (ref 98–112)
CHOLEST SERPL-MCNC: 155 MG/DL (ref ?–200)
CO2 SERPL-SCNC: 20 MMOL/L (ref 21–32)
CREAT BLD-MCNC: 1.34 MG/DL
EGFRCR SERPLBLD CKD-EPI 2021: 42 ML/MIN/1.73M2 (ref 60–?)
FASTING PATIENT LIPID ANSWER: NO
FASTING STATUS PATIENT QL REPORTED: NO
GLOBULIN PLAS-MCNC: 3.8 G/DL (ref 2.8–4.4)
GLUCOSE BLD-MCNC: 108 MG/DL (ref 70–99)
HDLC SERPL-MCNC: 50 MG/DL (ref 40–59)
LDLC SERPL CALC-MCNC: 82 MG/DL (ref ?–100)
NONHDLC SERPL-MCNC: 105 MG/DL (ref ?–130)
OSMOLALITY SERPL CALC.SUM OF ELEC: 279 MOSM/KG (ref 275–295)
POTASSIUM SERPL-SCNC: 4.2 MMOL/L (ref 3.5–5.1)
PROT SERPL-MCNC: 7.1 G/DL (ref 6.4–8.2)
SODIUM SERPL-SCNC: 132 MMOL/L (ref 136–145)
TRIGL SERPL-MCNC: 130 MG/DL (ref 30–149)
TSI SER-ACNC: 2.4 MIU/ML (ref 0.36–3.74)
VLDLC SERPL CALC-MCNC: 21 MG/DL (ref 0–30)

## 2023-11-17 PROCEDURE — 80053 COMPREHEN METABOLIC PANEL: CPT | Performed by: INTERNAL MEDICINE

## 2023-11-17 PROCEDURE — 84443 ASSAY THYROID STIM HORMONE: CPT | Performed by: INTERNAL MEDICINE

## 2023-11-17 PROCEDURE — 80061 LIPID PANEL: CPT | Performed by: INTERNAL MEDICINE

## 2023-11-20 ENCOUNTER — OFFICE VISIT (OUTPATIENT)
Dept: INTERNAL MEDICINE CLINIC | Facility: CLINIC | Age: 73
End: 2023-11-20
Payer: COMMERCIAL

## 2023-11-20 VITALS
BODY MASS INDEX: 21.45 KG/M2 | OXYGEN SATURATION: 99 % | HEIGHT: 61.02 IN | SYSTOLIC BLOOD PRESSURE: 126 MMHG | TEMPERATURE: 98 F | WEIGHT: 113.63 LBS | DIASTOLIC BLOOD PRESSURE: 62 MMHG | HEART RATE: 64 BPM | RESPIRATION RATE: 16 BRPM

## 2023-11-20 DIAGNOSIS — Z00.00 ROUTINE GENERAL MEDICAL EXAMINATION AT A HEALTH CARE FACILITY: Primary | ICD-10-CM

## 2023-11-20 DIAGNOSIS — J44.89 ASTHMA-COPD OVERLAP SYNDROME: ICD-10-CM

## 2023-11-20 DIAGNOSIS — R73.09 ELEVATED GLUCOSE: ICD-10-CM

## 2023-11-20 DIAGNOSIS — K50.918 CROHN'S DISEASE WITH OTHER COMPLICATION, UNSPECIFIED GASTROINTESTINAL TRACT LOCATION (HCC): ICD-10-CM

## 2023-11-20 DIAGNOSIS — Z78.0 POST-MENOPAUSAL: ICD-10-CM

## 2023-11-20 DIAGNOSIS — N28.9 RENAL INSUFFICIENCY: ICD-10-CM

## 2023-11-20 PROBLEM — K50.90 CROHN'S DISEASE (HCC): Status: ACTIVE | Noted: 2018-12-28

## 2023-11-20 PROCEDURE — 3008F BODY MASS INDEX DOCD: CPT | Performed by: INTERNAL MEDICINE

## 2023-11-20 PROCEDURE — 99397 PER PM REEVAL EST PAT 65+ YR: CPT | Performed by: INTERNAL MEDICINE

## 2023-11-20 PROCEDURE — 3074F SYST BP LT 130 MM HG: CPT | Performed by: INTERNAL MEDICINE

## 2023-11-20 PROCEDURE — 3078F DIAST BP <80 MM HG: CPT | Performed by: INTERNAL MEDICINE

## 2023-11-20 RX ORDER — TIOTROPIUM BROMIDE INHALATION SPRAY 3.12 UG/1
2 SPRAY, METERED RESPIRATORY (INHALATION) DAILY
Qty: 1 EACH | Refills: 3 | Status: SHIPPED | OUTPATIENT
Start: 2023-11-20

## 2023-11-20 RX ORDER — ALBUTEROL SULFATE 90 UG/1
2 AEROSOL, METERED RESPIRATORY (INHALATION) EVERY 6 HOURS PRN
Qty: 1 EACH | Refills: 3 | Status: SHIPPED | OUTPATIENT
Start: 2023-11-20

## 2023-11-24 ENCOUNTER — HOSPITAL ENCOUNTER (OUTPATIENT)
Dept: CT IMAGING | Facility: HOSPITAL | Age: 73
Discharge: HOME | End: 2023-11-24
Payer: COMMERCIAL

## 2023-11-24 DIAGNOSIS — Z93.2 ILEOSTOMY STATUS (HCC): ICD-10-CM

## 2023-11-24 DIAGNOSIS — K12.1 MOUTH ULCERS: ICD-10-CM

## 2023-11-24 DIAGNOSIS — K50.019 CROHN'S DISEASE OF SMALL INTESTINE WITH COMPLICATION (HCC): ICD-10-CM

## 2023-11-24 PROCEDURE — 74177 CT ABD & PELVIS W/CONTRAST: CPT

## 2023-11-24 RX ORDER — IOHEXOL 350 MG/ML
80 INJECTION, SOLUTION INTRAVENOUS
Status: COMPLETED | OUTPATIENT
Start: 2023-11-24 | End: 2023-11-24

## 2023-11-24 RX ADMIN — IOHEXOL 80 ML: 350 INJECTION, SOLUTION INTRAVENOUS at 10:52:00

## 2023-12-08 ENCOUNTER — HOSPITAL ENCOUNTER (OUTPATIENT)
Dept: MAMMOGRAPHY | Age: 73
Discharge: HOME | End: 2023-12-08
Attending: INTERNAL MEDICINE
Payer: COMMERCIAL

## 2023-12-08 DIAGNOSIS — Z12.31 ENCOUNTER FOR SCREENING MAMMOGRAM FOR MALIGNANT NEOPLASM OF BREAST: ICD-10-CM

## 2023-12-08 PROCEDURE — 77067 SCR MAMMO BI INCL CAD: CPT | Performed by: INTERNAL MEDICINE

## 2023-12-08 PROCEDURE — 77063 BREAST TOMOSYNTHESIS BI: CPT | Performed by: INTERNAL MEDICINE

## 2023-12-14 ENCOUNTER — HOSPITAL ENCOUNTER (OUTPATIENT)
Dept: BONE DENSITY | Age: 73
Discharge: HOME | End: 2023-12-14
Attending: INTERNAL MEDICINE
Payer: COMMERCIAL

## 2023-12-14 DIAGNOSIS — Z78.0 POST-MENOPAUSAL: ICD-10-CM

## 2023-12-14 PROCEDURE — 77080 DXA BONE DENSITY AXIAL: CPT | Performed by: INTERNAL MEDICINE

## 2023-12-19 ENCOUNTER — LAB ENCOUNTER (OUTPATIENT)
Dept: LAB | Age: 73
End: 2023-12-19
Attending: INTERNAL MEDICINE
Payer: COMMERCIAL

## 2023-12-19 DIAGNOSIS — K50.019 CROHN'S DISEASE OF SMALL INTESTINE WITH COMPLICATION (HCC): ICD-10-CM

## 2023-12-19 PROCEDURE — 86704 HEP B CORE ANTIBODY TOTAL: CPT

## 2023-12-19 PROCEDURE — 86480 TB TEST CELL IMMUN MEASURE: CPT

## 2023-12-19 PROCEDURE — 86706 HEP B SURFACE ANTIBODY: CPT

## 2023-12-19 PROCEDURE — 87340 HEPATITIS B SURFACE AG IA: CPT

## 2023-12-19 PROCEDURE — 36415 COLL VENOUS BLD VENIPUNCTURE: CPT

## 2023-12-20 LAB
HBV CORE AB SERPL QL IA: NONREACTIVE
HBV SURFACE AB SER QL: NONREACTIVE
HBV SURFACE AB SERPL IA-ACNC: 5.44 MIU/ML
HBV SURFACE AG SER-ACNC: <0.1 [IU]/L
HBV SURFACE AG SERPL QL IA: NONREACTIVE
M TB IFN-G CD4+ T-CELLS BLD-ACNC: 0.01 IU/ML
M TB TUBERC IFN-G BLD QL: NEGATIVE
M TB TUBERC IGNF/MITOGEN IGNF CONTROL: >10 IU/ML
QFT TB1 AG MINUS NIL: 0.02 IU/ML
QFT TB2 AG MINUS NIL: 0.02 IU/ML

## 2023-12-21 ENCOUNTER — PATIENT MESSAGE (OUTPATIENT)
Dept: INTERNAL MEDICINE CLINIC | Facility: CLINIC | Age: 73
End: 2023-12-21

## 2023-12-27 ENCOUNTER — NURSE ONLY (OUTPATIENT)
Dept: INTERNAL MEDICINE CLINIC | Facility: CLINIC | Age: 73
End: 2023-12-27
Payer: COMMERCIAL

## 2023-12-27 PROCEDURE — 90471 IMMUNIZATION ADMIN: CPT | Performed by: INTERNAL MEDICINE

## 2023-12-27 PROCEDURE — 90746 HEPB VACCINE 3 DOSE ADULT IM: CPT | Performed by: INTERNAL MEDICINE

## 2024-01-04 ENCOUNTER — PATIENT MESSAGE (OUTPATIENT)
Dept: INTERNAL MEDICINE CLINIC | Facility: CLINIC | Age: 74
End: 2024-01-04

## 2024-01-04 DIAGNOSIS — R25.1 OCCASIONAL TREMORS: Primary | ICD-10-CM

## 2024-01-05 NOTE — TELEPHONE ENCOUNTER
Called and spoke to pt. Pt said she has had hand tremors for awhile and is wanting to rule out low Mg, K, or b12. Hand tremors are bilateral and worse in am. She is on omeprazole and has read this can cause low Mg. Her mother had parkinson's so is wanting to rule out these potential causes of her sxs. Pt is on prednisone currently for a chron's flare, she believes the chron's is causing her weakness as this has been going on for a while as well and is bilateral. Pt did apologize as she felt she was not kind on the phone earlier, she stated being on the prednisone will sometimes make her irritable. Pt thankful for our help.     BMP and A1c already ordered for pt to complete. Pended other labs if agreeable, or need OV?

## 2024-01-05 NOTE — TELEPHONE ENCOUNTER
Pt is calling to check on the status. She is requesting pre diabetes labs, potassium magnesium, b12. She is scheduled for blood work on 1/9/24 pt would like a call back once ordered. [phone]    I informed her typically an appt is required to further discuss, Pt declined stated she has never had to do that before.

## 2024-01-09 ENCOUNTER — LAB ENCOUNTER (OUTPATIENT)
Dept: LAB | Age: 74
End: 2024-01-09
Attending: INTERNAL MEDICINE
Payer: COMMERCIAL

## 2024-01-09 DIAGNOSIS — R25.1 OCCASIONAL TREMORS: ICD-10-CM

## 2024-01-09 LAB
MAGNESIUM SERPL-MCNC: 1.3 MG/DL (ref 1.6–2.6)
VIT B12 SERPL-MCNC: 293 PG/ML (ref 193–986)

## 2024-01-09 PROCEDURE — 83735 ASSAY OF MAGNESIUM: CPT

## 2024-01-09 PROCEDURE — 82607 VITAMIN B-12: CPT

## 2024-01-24 NOTE — H&P
HPI:     Mae Mark is a 73 year old female with a PMH of anxiety, HL, DM, PERLA, Crohn's s/p total colectomy + ileostomy (1979), partial SBR, OA.  She presents as a consult with:  1. Recurrent kidney stones  - noted on imaging  - no prior episodes  2. AMH     PCP - [facility]    She does not feel well. Appetite is improved with prednisone.    Had CT enterography on 11/24/23 noted below for h/o Crohn's disease. She never had pain and denies right flank pain, dysuria, hematuria. She has not been straining urine and has not visualized stone passage.    Drinks ~ 20-30 oz water, 12 coffee with medium to light yellow urine.    UTI hx: none  Gross hematuria: episode ~ 30 y ago, nothing since  Tobacco hx: ~ 80 pack years, currently 1/2 PPD  Fam h/o  malignancy: none    UA is negative    CT AP 11/24/23: 7 proximal right ureteral, 4 RMP. 74 x 78 mm left renal cyst  CT SP 7/15/23: 4 RMP, 8 RLP    We discussed stone prevention strategies at today's visit and I provided and reviewed educational materials for this. I recommend drinking at least 40-60 ounces of water per day or enough water to keep urine clear. I also recommend the patient avoid a high sodium diet. I also recommend avoiding foods high in oxalate and provided a list of foods high in oxalate.     Reviewed imaging with her. She would like to check a CT scan to see if she passed the large ureteral stone. She has not visualized stone passage and we discussed the risks a/w leaving a ureteral stone in place. She would like to proceed to OR for cysto, right flex URS, laser lithotripsy, stone removal and stent placement. We discussed the risks and benefits to the procedure including, but not limited to, bleeding, infection, possible damage to surrounding structures. The patient understands and would like to proceed.    If she has passed stone I would still recommend cystoscopy at some point given AMH in the past.    She will significantly increase water intake for  stone prevention, checking CT SP and OR as noted above. She requests Nephrology referral for CKD. Can consider 24 h urine later but will discuss this at NOV.    HISTORY:  Past Medical History:   Diagnosis Date    Abdominal distention     Abdominal hernia Abdominal [facility] Dr. Perea    2021    Abdominal pain     years ago before ostomy    Anemia     occasional in the past    Anxiety     Arthritis     Atypical mole     Back pain     Bad breath     Belching     Black stools     years ago    Bleeding nose     Bloating     Blood in the stool     years ago    Blood in urine     Blurred vision     Body piercing pierced ears only    Calculus of kidney     Have just been told I have kidney stones by [facility] Doctor    Change in hair     hair drier than usual    Chest pain on exertion smoker    pressure    Chronic cough     Crohn's disease (HCC)     Diabetes mellitus (HCC)     Diarrhea, unspecified     Dizziness still a little weak    still not steady    Easy bruising     Fatigue     Fever     Flatulence/gas pain/belching     Food intolerance     Frequent urination     Headache disorder July 2023    from deyhdration?    Hearing loss winter 2022    Dr. Perea    Heartburn     Hemorrhoids     High cholesterol     Indigestion approx. January 2018    have no gallbladder    Irregular bowel habits     chron's    Itch of skin ?    all over/not severe but bothersome    Kidney failure     mild    Leg swelling     ankles-once in a while    Loss of appetite     Mouth sores     occasional fever blister    Nausea     since January 2018-occasional    Night sweats     Pain in joints     Painful swallowing March-April, 2023    not painful but uncomfortable    Painful urination     Problems with swallowing April, 2023    Began taking Terbinafine March 22, 2023 for a toenail fungal infection. Began noticing irritated throat/top of gut were burning and swallowing becoming uncomfortable. Also experiencing weakness, dehydration,  nausea, taste buds gone, loss of appetite    Rash     Shortness of breath     Skin blushing/flushing     years ago with active crohn's/before ileostomy    Sleep apnea     Sleep disturbance     Sputum production     Stool incontinence     years ago    Stress     typical on occasion    Uncomfortable fullness after meals     Vomiting     Wears glasses     Weight gain     Weight loss     Wheezing       Past Surgical History:   Procedure Laterality Date    APPENDECTOMY      removed during ileostomy?    BOWEL RESECTION      CATARACT Left 2018    CATARACT Right 10/2018    CHOLECYSTECTOMY      COLECTOMY      COLONOSCOPY  ?    over 10 years    CORRECT BUNION,SIMPLE      HAND/FINGER SURGERY UNLISTED Right     trigger thumb release    HAND/FINGER SURGERY UNLISTED Left 2014    A1 Pulley release    PART REMOVAL COLON W END COLOSTOMY      colon resection w/ileostomy    REMOVAL GALLBLADDER      REVISION OF ILEOSTOMY,SIMPLE        Family History   Problem Relation Age of Onset    Diabetes Father         father    Cancer Father         lung, +smoker    Crohn's Disease Sister     Crohn's Disease Sister         Crohn's - her son also has Crohn's now, also my great niece has Crohn's    Diabetes Brother     Crohn's Disease Brother             Cancer Brother         lung cancer, +smoker    Breast Cancer Maternal Grandmother 80            Crohn's Disease Nephew     Crohn's Disease Other       Social History:   Social History     Socioeconomic History    Marital status:    Tobacco Use    Smoking status: Every Day     Packs/day: 0.50     Years: 50.00     Additional pack years: 0.00     Total pack years: 25.00     Types: Cigarettes     Last attempt to quit: 2023     Years since quittin.5    Smokeless tobacco: Never    Tobacco comments:     tried Chantix three times-after about 2 months I became depressed and irritable.   Vaping Use    Vaping Use: Never used   Substance and Sexual Activity    Alcohol  use: Not Currently     Comment: very rarely consume alcohol    Drug use: Not Currently     Types: Cannabis    Sexual activity: Not Currently   Social History Narrative    , no children.  Retired, previously worked in administration.        Medications (Active prior to today's visit):  Current Outpatient Medications   Medication Sig Dispense Refill    ondansetron (ZOFRAN) 4 mg tablet Take 1 tablet (4 mg total) by mouth every 8 (eight) hours as needed for Nausea. 60 tablet 0    metRONIDAZOLE 0.75 % External Cream apply TO AFFECTED AREA ON face EVERY DAY BEFORE NOON      pimecrolimus 1 % External Cream apply topically TWICE DAILY TO affected AREAS      Sulfacetamide Sodium-Sulfur 10-5 % External Liquid Apply 1 Application topically 2 (two) times daily.      triamcinolone 0.1 % External Cream APPLY TO THE AFFECTED AREA(S) ON BACK TWICE DAILY FOR UP TO TWO WEEKS. AVOID face, armpits AND groin      predniSONE 5 MG Oral Tab Take 8 tablets (40 mg total) by mouth daily with breakfast. 240 tablet 1    albuterol (VENTOLIN HFA) 108 (90 Base) MCG/ACT Inhalation Aero Soln Inhale 2 puffs into the lungs every 6 (six) hours as needed for Wheezing. 1 each 3    Tiotropium Bromide Monohydrate (SPIRIVA RESPIMAT) 2.5 MCG/ACT Inhalation Aero Soln Inhale 2 puffs into the lungs daily. 1 each 3    acetaminophen 500 MG Oral Tab Take 1 tablet (500 mg total) by mouth every 6 (six) hours as needed for Pain.      Omeprazole 40 MG Oral Capsule Delayed Release Take 1 capsule (40 mg total) by mouth daily. 90 capsule 3       Allergies:  Allergies   Allergen Reactions    Mold Spores ASTHMA, ITCHING, Runny nose and WHEEZING         ROS:     A comprehensive 10 point review of systems was completed.  Pertinent positives and negatives noted in the the HPI.    PHYSICAL EXAM:     GENERAL APPEARANCE: well, developed, well nourished, in no acute distress  NEUROLOGIC: nonfocal, alert and oriented  HEAD: normocephalic, atraumatic  EYES: sclera  non-icteric  EARS: hearing intact  ORAL CAVITY: mucosa moist  NECK/THYROID: no obvious goiter or masses  LUNGS: nonlabored breathing  ABDOMEN: soft, no obvious masses or tenderness  SKIN: no obvious rashes    : as noted above     ASSESSMENT/PLAN:   Diagnoses and all orders for this visit:    Recurrent kidney stones  -     URINALYSIS, AUTO, W/O SCOPE  -     CT ABDOMEN+PELVIS KIDNEYSTONE 2D RNDR(NO IV,NO ORAL)(CPT=74176); Future    Asymptomatic microscopic hematuria  -     URINALYSIS, AUTO, W/O SCOPE    Chronic kidney disease, unspecified CKD stage  -     Nephrology Referral - [facility])      - as noted above.    Thanks again for this consult.    Joao Marie MD, FACS  Urologist  [facility]  Office: [phone]

## 2024-01-24 NOTE — H&P (VIEW-ONLY)
HPI:     Mae Mark is a 73 year old female with a PMH of anxiety, HL, DM, PERLA, Crohn's s/p total colectomy + ileostomy (1979), partial SBR, OA.  She presents as a consult with:  1. Recurrent kidney stones  - noted on imaging  - no prior episodes  2. AMH     PCP - Chicho    She does not feel well. Appetite is improved with prednisone.    Had CT enterography on 11/24/23 noted below for h/o Crohn's disease. She never had pain and denies right flank pain, dysuria, hematuria. She has not been straining urine and has not visualized stone passage.    Drinks ~ 20-30 oz water, 12 coffee with medium to light yellow urine.    UTI hx: none  Gross hematuria: episode ~ 30 y ago, nothing since  Tobacco hx: ~ 80 pack years, currently 1/2 PPD  Fam h/o  malignancy: none    UA is negative    CT AP 11/24/23: 7 proximal right ureteral, 4 RMP. 74 x 78 mm left renal cyst  CT SP 7/15/23: 4 RMP, 8 RLP    We discussed stone prevention strategies at today's visit and I provided and reviewed educational materials for this. I recommend drinking at least 40-60 ounces of water per day or enough water to keep urine clear. I also recommend the patient avoid a high sodium diet. I also recommend avoiding foods high in oxalate and provided a list of foods high in oxalate.     Reviewed imaging with her. She would like to check a CT scan to see if she passed the large ureteral stone. She has not visualized stone passage and we discussed the risks a/w leaving a ureteral stone in place. She would like to proceed to OR for cysto, right flex URS, laser lithotripsy, stone removal and stent placement. We discussed the risks and benefits to the procedure including, but not limited to, bleeding, infection, possible damage to surrounding structures. The patient understands and would like to proceed.    If she has passed stone I would still recommend cystoscopy at some point given AMH in the past.    She will significantly increase water intake for  stone prevention, checking CT SP and OR as noted above. She requests Nephrology referral for CKD. Can consider 24 h urine later but will discuss this at NOV.    HISTORY:  Past Medical History:   Diagnosis Date    Abdominal distention     Abdominal hernia Abdominal [facility] Dr. Perea    2021    Abdominal pain     years ago before ostomy    Anemia     occasional in the past    Anxiety     Arthritis     Atypical mole     Back pain     Bad breath     Belching     Black stools     years ago    Bleeding nose     Bloating     Blood in the stool     years ago    Blood in urine     Blurred vision     Body piercing pierced ears only    Calculus of kidney     Have just been told I have kidney stones by [facility] Doctor    Change in hair     hair drier than usual    Chest pain on exertion smoker    pressure    Chronic cough     Crohn's disease (HCC)     Diabetes mellitus (HCC)     Diarrhea, unspecified     Dizziness still a little weak    still not steady    Easy bruising     Fatigue     Fever     Flatulence/gas pain/belching     Food intolerance     Frequent urination     Headache disorder July 2023    from deyhdration?    Hearing loss winter 2022    Dr. Perea    Heartburn     Hemorrhoids     High cholesterol     Indigestion approx. January 2018    have no gallbladder    Irregular bowel habits     chron's    Itch of skin ?    all over/not severe but bothersome    Kidney failure     mild    Leg swelling     ankles-once in a while    Loss of appetite     Mouth sores     occasional fever blister    Nausea     since January 2018-occasional    Night sweats     Pain in joints     Painful swallowing March-April, 2023    not painful but uncomfortable    Painful urination     Problems with swallowing April, 2023    Began taking Terbinafine March 22, 2023 for a toenail fungal infection. Began noticing irritated throat/top of gut were burning and swallowing becoming uncomfortable. Also experiencing weakness, dehydration,  nausea, taste buds gone, loss of appetite    Rash     Shortness of breath     Skin blushing/flushing     years ago with active crohn's/before ileostomy    Sleep apnea     Sleep disturbance     Sputum production     Stool incontinence     years ago    Stress     typical on occasion    Uncomfortable fullness after meals     Vomiting     Wears glasses     Weight gain     Weight loss     Wheezing       Past Surgical History:   Procedure Laterality Date    APPENDECTOMY      removed during ileostomy?    BOWEL RESECTION      CATARACT Left 2018    CATARACT Right 10/2018    CHOLECYSTECTOMY      COLECTOMY      COLONOSCOPY  ?    over 10 years    CORRECT BUNION,SIMPLE      HAND/FINGER SURGERY UNLISTED Right     trigger thumb release    HAND/FINGER SURGERY UNLISTED Left 2014    A1 Pulley release    PART REMOVAL COLON W END COLOSTOMY      colon resection w/ileostomy    REMOVAL GALLBLADDER      REVISION OF ILEOSTOMY,SIMPLE        Family History   Problem Relation Age of Onset    Diabetes Father         father    Cancer Father         lung, +smoker    Crohn's Disease Sister     Crohn's Disease Sister         Crohn's - her son also has Crohn's now, also my great niece has Crohn's    Diabetes Brother     Crohn's Disease Brother             Cancer Brother         lung cancer, +smoker    Breast Cancer Maternal Grandmother 80            Crohn's Disease Nephew     Crohn's Disease Other       Social History:   Social History     Socioeconomic History    Marital status:    Tobacco Use    Smoking status: Every Day     Packs/day: 0.50     Years: 50.00     Additional pack years: 0.00     Total pack years: 25.00     Types: Cigarettes     Last attempt to quit: 2023     Years since quittin.5    Smokeless tobacco: Never    Tobacco comments:     tried Chantix three times-after about 2 months I became depressed and irritable.   Vaping Use    Vaping Use: Never used   Substance and Sexual Activity    Alcohol  use: Not Currently     Comment: very rarely consume alcohol    Drug use: Not Currently     Types: Cannabis    Sexual activity: Not Currently   Social History Narrative    , no children.  Retired, previously worked in administration.        Medications (Active prior to today's visit):  Current Outpatient Medications   Medication Sig Dispense Refill    ondansetron (ZOFRAN) 4 mg tablet Take 1 tablet (4 mg total) by mouth every 8 (eight) hours as needed for Nausea. 60 tablet 0    metRONIDAZOLE 0.75 % External Cream apply TO AFFECTED AREA ON face EVERY DAY BEFORE NOON      pimecrolimus 1 % External Cream apply topically TWICE DAILY TO affected AREAS      Sulfacetamide Sodium-Sulfur 10-5 % External Liquid Apply 1 Application topically 2 (two) times daily.      triamcinolone 0.1 % External Cream APPLY TO THE AFFECTED AREA(S) ON BACK TWICE DAILY FOR UP TO TWO WEEKS. AVOID face, armpits AND groin      predniSONE 5 MG Oral Tab Take 8 tablets (40 mg total) by mouth daily with breakfast. 240 tablet 1    albuterol (VENTOLIN HFA) 108 (90 Base) MCG/ACT Inhalation Aero Soln Inhale 2 puffs into the lungs every 6 (six) hours as needed for Wheezing. 1 each 3    Tiotropium Bromide Monohydrate (SPIRIVA RESPIMAT) 2.5 MCG/ACT Inhalation Aero Soln Inhale 2 puffs into the lungs daily. 1 each 3    acetaminophen 500 MG Oral Tab Take 1 tablet (500 mg total) by mouth every 6 (six) hours as needed for Pain.      Omeprazole 40 MG Oral Capsule Delayed Release Take 1 capsule (40 mg total) by mouth daily. 90 capsule 3       Allergies:  Allergies   Allergen Reactions    Mold Spores ASTHMA, ITCHING, Runny nose and WHEEZING         ROS:     A comprehensive 10 point review of systems was completed.  Pertinent positives and negatives noted in the the HPI.    PHYSICAL EXAM:     GENERAL APPEARANCE: well, developed, well nourished, in no acute distress  NEUROLOGIC: nonfocal, alert and oriented  HEAD: normocephalic, atraumatic  EYES: sclera  non-icteric  EARS: hearing intact  ORAL CAVITY: mucosa moist  NECK/THYROID: no obvious goiter or masses  LUNGS: nonlabored breathing  ABDOMEN: soft, no obvious masses or tenderness  SKIN: no obvious rashes    : as noted above     ASSESSMENT/PLAN:   Diagnoses and all orders for this visit:    Recurrent kidney stones  -     URINALYSIS, AUTO, W/O SCOPE  -     CT ABDOMEN+PELVIS KIDNEYSTONE 2D RNDR(NO IV,NO ORAL)(CPT=74176); Future    Asymptomatic microscopic hematuria  -     URINALYSIS, AUTO, W/O SCOPE    Chronic kidney disease, unspecified CKD stage  -     Nephrology Referral - [facility])      - as noted above.    Thanks again for this consult.    Joao Marie MD, FACS  Urologist  [facility]  Office: [phone]

## 2024-01-29 ENCOUNTER — NURSE ONLY (OUTPATIENT)
Dept: INTERNAL MEDICINE CLINIC | Facility: CLINIC | Age: 74
End: 2024-01-29
Payer: COMMERCIAL

## 2024-01-29 DIAGNOSIS — E53.8 B12 DEFICIENCY: ICD-10-CM

## 2024-01-29 DIAGNOSIS — Z23 NEED FOR HEPATITIS B VACCINATION: Primary | ICD-10-CM

## 2024-01-29 RX ORDER — CYANOCOBALAMIN 1000 UG/ML
1000 INJECTION, SOLUTION INTRAMUSCULAR; SUBCUTANEOUS ONCE
Status: COMPLETED | OUTPATIENT
Start: 2024-01-29 | End: 2024-01-29

## 2024-01-29 RX ADMIN — CYANOCOBALAMIN 1000 MCG: 1000 INJECTION, SOLUTION INTRAMUSCULAR; SUBCUTANEOUS at 10:24:00

## 2024-01-31 ENCOUNTER — TELEPHONE (OUTPATIENT)
Dept: SURGERY | Facility: CLINIC | Age: 74
End: 2024-01-31

## 2024-01-31 ENCOUNTER — OFFICE VISIT (OUTPATIENT)
Dept: SURGERY | Facility: CLINIC | Age: 74
End: 2024-01-31
Payer: COMMERCIAL

## 2024-01-31 DIAGNOSIS — N20.0 RECURRENT KIDNEY STONES: Primary | ICD-10-CM

## 2024-01-31 DIAGNOSIS — N18.9 CHRONIC KIDNEY DISEASE, UNSPECIFIED CKD STAGE: ICD-10-CM

## 2024-01-31 DIAGNOSIS — N20.0 RIGHT RENAL STONE: ICD-10-CM

## 2024-01-31 DIAGNOSIS — N20.1 RIGHT URETERAL STONE: Primary | ICD-10-CM

## 2024-01-31 DIAGNOSIS — R31.21 ASYMPTOMATIC MICROSCOPIC HEMATURIA: ICD-10-CM

## 2024-01-31 LAB
APPEARANCE: CLEAR
BILIRUBIN: NEGATIVE
GLUCOSE (URINE DIPSTICK): NEGATIVE MG/DL
KETONES (URINE DIPSTICK): NEGATIVE MG/DL
MULTISTIX LOT#: ABNORMAL NUMERIC
NITRITE, URINE: NEGATIVE
OCCULT BLOOD: NEGATIVE
PH, URINE: 6 (ref 4.5–8)
PROTEIN (URINE DIPSTICK): 30 MG/DL
SPECIFIC GRAVITY: 1.02 (ref 1–1.03)
URINE-COLOR: YELLOW
UROBILINOGEN,SEMI-QN: 0.2 MG/DL (ref 0–1.9)

## 2024-01-31 PROCEDURE — 81003 URINALYSIS AUTO W/O SCOPE: CPT | Performed by: UROLOGY

## 2024-01-31 PROCEDURE — 99204 OFFICE O/P NEW MOD 45 MIN: CPT | Performed by: UROLOGY

## 2024-01-31 NOTE — TELEPHONE ENCOUNTER
Please schedule this patient for surgery. Please remind her to get CT scan prior to scheduling surgery.    ThanksJOSE LUIS    Urology Surgery Request  Surgeon: Cynthia  Location (if known): [facility]  Procedure: cysto, possible bladder biopsy, right flex URS, laser litho, stone removal with stent placement   Anesthesia: General   Time Frame: pt preference, not urgent  Time required: 45 minutes  Diagnosis: right ureteral and renal stones    Antibiotics: per hospital protocol unless checked below   _x_ Levaquin 500 mg IV   ___ Gemcitabine 2 g/100 mL NS bladder instillation to be given in OR    Estimated Post Op/Follow Up Appt: the following week with me for cysto, stent removal.

## 2024-01-31 NOTE — TELEPHONE ENCOUNTER
Received a return call from the patient who will be scheduling her CT Scan today.  Advised that I will call her tomorrow to schedule the surgery. Patient can contact me at [phone]

## 2024-02-01 NOTE — TELEPHONE ENCOUNTER
Spoke with the patient.  Scheduling the surgery for  02/08/24.   Reviewed the pre-op instructions and will send via My Chart once confirmed.  Patient can contact me at [phone]

## 2024-02-02 ENCOUNTER — PATIENT MESSAGE (OUTPATIENT)
Dept: SURGERY | Facility: CLINIC | Age: 74
End: 2024-02-02

## 2024-02-02 ENCOUNTER — HOSPITAL ENCOUNTER (OUTPATIENT)
Dept: CT IMAGING | Facility: HOSPITAL | Age: 74
Discharge: HOME | End: 2024-02-02
Attending: UROLOGY
Payer: COMMERCIAL

## 2024-02-02 DIAGNOSIS — N20.0 RECURRENT KIDNEY STONES: ICD-10-CM

## 2024-02-02 PROCEDURE — 74176 CT ABD & PELVIS W/O CONTRAST: CPT | Performed by: UROLOGY

## 2024-02-02 NOTE — TELEPHONE ENCOUNTER
From: Mae Mark  To: Joao Marie  Sent: 2/2/2024 12:10 PM CST  Subject: Mae Mark [id number]    Hello.  Jumping the gun on CAT SCAN results of this morning. Do the findings on kidney stones mean I don't have to have kidney stone removal procedure at this time or do you think it is still necessary for future? Your communication and feedback are greatly appreciated.  Kasey Mark

## 2024-02-02 NOTE — TELEPHONE ENCOUNTER
She still has an obstructing stone on the right and needs to proceed with surgery as planned. If she has more questions prior you can add her on for in person or virtual visit with me to review.

## 2024-02-06 NOTE — PROGRESS NOTES
HPI:     Mae Mark is a 73 year old female with a PMH of anxiety, HL, DM, PERLA, Crohn's s/p total colectomy + ileostomy (1979), partial SBR, OA.  Following for:  1. Recurrent kidney stones  - noted on imaging  - no prior episodes  2. AMH     PCP - Chicho    She does not feel well. Appetite is improved with prednisone.  She tolerated the stent.    CT showed right ureteral stone had been present since at least Nov 2023.    Reported ~ 20-30 oz water, 12 coffee with medium to light yellow urine.     UTI hx: none  Gross hematuria: episode ~ 30 y ago, nothing since  Tobacco hx: ~ 80 pack years, currently 1/2 PPD  Fam h/o  malignancy: none    UA is negative for infection    CT SP 2/2/24: 2 RUP, 4 RMP, 7-8 R prox ureter. 3 LUP, 4, 4 LLP  CT AP 11/24/23: 7 proximal right ureteral, 4 RMP. 74 x 78 mm left renal cyst  CT SP 7/15/23: 4 RMP, 8 RLP    We again discussed stone prevention strategies at today's visit and I provided and reviewed educational materials for this. I recommend drinking at least 40-60 ounces of water per day or enough water to keep urine clear. I also recommend the patient avoid a high sodium diet. I also recommend avoiding foods high in oxalate and provided a list of foods high in oxalate.     She wants to observe the left renal stones for now but may consider URS later. She wants to check 24 h urine and will set up visit if significant abnormalities noted. She requests nephrology referral for mild CKD. F/u in 6 mo with CT prior.    PROCEDURE NOTE    PREOPERATIVE DIAGNOSIS:     Retained ureteral stent     POSTOP DIAGNOSIS:   Same    PROCEDURE PERFORMED: Flexible Cystoscopy with Ureteral Stent Removal    After informed consent and urinalysis was obtained, patient was placed in the modified lithotomy position and all pressure points were padded. She was prepped and draped in the usual sterile fashion using Betadine.    A 16 Turkish flexible scope was passed through the urethra, and the bladder  was entered, the stent was identified and grasped with a grasper and gently removed.    The patient tolerated the procedure well, suffered no complications, was able to void spontaneously after completion of the procedure in the office, and left the office in good condition. The patient was given periprocedural antibiotics.    The patient tolerated the procedure well, suffered no complications, was able to void spontaneously after completion of the procedure in the office, and left the office in good condition.    Alice-procedural antibiotics were given.  _________________________________    Prior note:  If she has passed stone I would still recommend cystoscopy at some point given AMH in the past.    She will significantly increase water intake for stone prevention, checking CT SP and OR as noted above. She requests Nephrology referral for CKD. Can consider 24 h urine later but will discuss this at NOV.    HISTORY:  Past Medical History:   Diagnosis Date    Abdominal pain     years ago before ostomy    Anemia     occasional in the past    Anxiety     Arthritis     Asthma     Atypical mole     Back pain     Black stools     years ago    Bleeding nose     Bloating     Blood in the stool     years ago    Blood in urine     Blurred vision     Calculus of kidney     Have just been told I have kidney stones by [facility] Doctor    Chest pain on exertion smoker    pressure    Chronic cough     Crohn's disease (HCC)     Diabetes mellitus (HCC)     Diarrhea, unspecified     Dizziness still a little weak    still not steady    Easy bruising     Fatigue     Food intolerance     Frequent urination     [facility] July 2023    [facility]    Hearing impairment     hearing aids    Hearing loss winter 2022    Dr. Perea    Heartburn     Hemorrhoids     High cholesterol     Irregular bowel habits     chron's    Kidney failure     mild    Leg swelling     ankles-once in a while    Loss of appetite     Nausea      since 2018-occasional    Night sweats     Osteoarthritis     hands, knees    Pain in joints     Painful swallowing March-    not painful but uncomfortable    Painful urination     Prediabetes     Problems with swallowing     Began taking Terbinafine 2023 for a toenail fungal infection. Began noticing irritated throat/top of gut were burning and swallowing becoming uncomfortable. Also experiencing weakness, dehydration, nausea, taste buds gone, loss of appetite    Rash     Skin blushing/flushing     years ago with active crohn's/before ileostomy    Sleep disturbance     Sputum production     Stool incontinence     years ago    Stress     typical on occasion    Uncomfortable fullness after meals     Visual impairment     glasses    Wheezing       Past Surgical History:   Procedure Laterality Date    APPENDECTOMY      removed during ileostomy?    CATARACT Left 2018    CATARACT Right 10/2018    CHOLECYSTECTOMY      COLECTOMY      COLONOSCOPY  ?    over 10 years    CORRECT BUNION,SIMPLE      HAND/FINGER SURGERY UNLISTED Right     trigger thumb release    HAND/FINGER SURGERY UNLISTED Left 2014    A1 Pulley release    PART REMOVAL COLON W END COLOSTOMY      colon resection w/ileostomy    REVISION OF ILEOSTOMY,SIMPLE        Family History   Problem Relation Age of Onset    Diabetes Father         father    Cancer Father         lung, +smoker    Crohn's Disease Sister     Crohn's Disease Sister         Crohn's - her son also has Crohn's now, also my great niece has Crohn's    Diabetes Brother     Crohn's Disease Brother             Cancer Brother         lung cancer, +smoker    Breast Cancer Maternal Grandmother 80            Crohn's Disease Nephew     Crohn's Disease Other       Social History:   Social History     Socioeconomic History    Marital status:    Tobacco Use    Smoking status: Every Day     Packs/day: 0.50     Years: 50.00     Additional  pack years: 0.00     Total pack years: 25.00     Types: Cigarettes     Last attempt to quit: 2023     Years since quittin.6    Smokeless tobacco: Never    Tobacco comments:     tried Chantix three times-after about 2 months I became depressed and irritable.   Vaping Use    Vaping Use: Never used   Substance and Sexual Activity    Alcohol use: Not Currently     Comment: very rarely consume alcohol    Drug use: Yes     Types: Cannabis    Sexual activity: Not Currently   Social History Narrative    , no children.  Retired, previously worked in administration.        Medications (Active prior to today's visit):  Current Outpatient Medications   Medication Sig Dispense Refill    HYDROcodone-acetaminophen (NORCO) 5-325 MG Oral Tab Take 1 tablet by mouth every 6 (six) hours as needed for Pain. 30 tablet 0    docusate sodium 100 MG Oral Cap Take 1 capsule (100 mg total) by mouth 2 (two) times daily for 14 days. Stop taking if loose stools/diarrhea. 28 capsule 0    ondansetron (ZOFRAN) 4 mg tablet Take 1 tablet (4 mg total) by mouth every 8 (eight) hours as needed for Nausea. 60 tablet 0    metRONIDAZOLE 0.75 % External Cream apply TO AFFECTED AREA ON face EVERY DAY BEFORE NOON      pimecrolimus 1 % External Cream apply topically TWICE DAILY TO affected AREAS      Sulfacetamide Sodium-Sulfur 10-5 % External Liquid Apply 1 Application topically 2 (two) times daily.      triamcinolone 0.1 % External Cream APPLY TO THE AFFECTED AREA(S) ON BACK TWICE DAILY FOR UP TO TWO WEEKS. AVOID face, armpits AND groin      predniSONE 5 MG Oral Tab Take 8 tablets (40 mg total) by mouth daily with breakfast. 240 tablet 1    albuterol (VENTOLIN HFA) 108 (90 Base) MCG/ACT Inhalation Aero Soln Inhale 2 puffs into the lungs every 6 (six) hours as needed for Wheezing. 1 each 3    Tiotropium Bromide Monohydrate (SPIRIVA RESPIMAT) 2.5 MCG/ACT Inhalation Aero Soln Inhale 2 puffs into the lungs daily. 1 each 3    acetaminophen 500 MG  Oral Tab Take 1 tablet (500 mg total) by mouth every 6 (six) hours as needed for Pain.      Omeprazole 40 MG Oral Capsule Delayed Release Take 1 capsule (40 mg total) by mouth daily. 90 capsule 3       Allergies:  Allergies   Allergen Reactions    Mold Spores ASTHMA, ITCHING, Runny nose and WHEEZING         ROS:     A comprehensive 10 point review of systems was completed.  Pertinent positives and negatives noted in the the HPI.    PHYSICAL EXAM:     GENERAL APPEARANCE: well, developed, well nourished, in no acute distress  NEUROLOGIC: nonfocal, alert and oriented  HEAD: normocephalic, atraumatic  EYES: sclera non-icteric  EARS: hearing intact  ORAL CAVITY: mucosa moist  NECK/THYROID: no obvious goiter or masses  LUNGS: nonlabored breathing  ABDOMEN: soft, no obvious masses or tenderness  SKIN: no obvious rashes    : as noted above     ASSESSMENT/PLAN:   Diagnoses and all orders for this visit:    Recurrent kidney stones  -     ciprofloxacin (Cipro) tab 500 mg  -     Kidney Stone Urine Test Combination With Saturation Calculations; Future  -     CT ABDOMEN+PELVIS KIDNEYSTONE 2D RNDR(NO IV,NO ORAL)(CPT=74176); Future    Foreign body in bladder, initial encounter  -     ciprofloxacin (Cipro) tab 500 mg  -     CYSTOSCOPY,REMV CALCULUS,SIMPLE    Chronic kidney disease, unspecified CKD stage  -     [facility]    - as noted above.    Thanks again for this consult.    Joao Marie MD, FACS  Urologist  [facility]  Office: [phone]

## 2024-02-08 ENCOUNTER — ANESTHESIA EVENT (OUTPATIENT)
Dept: SURGERY | Facility: HOSPITAL | Age: 74
End: 2024-02-08
Payer: COMMERCIAL

## 2024-02-08 ENCOUNTER — HOSPITAL ENCOUNTER (OUTPATIENT)
Dept: GENERAL RADIOLOGY | Facility: HOSPITAL | Age: 74
Discharge: HOME | End: 2024-02-08
Attending: UROLOGY
Payer: COMMERCIAL

## 2024-02-08 ENCOUNTER — HOSPITAL ENCOUNTER (OUTPATIENT)
Facility: HOSPITAL | Age: 74
Setting detail: HOSPITAL OUTPATIENT SURGERY
Discharge: HOME | End: 2024-02-08
Attending: UROLOGY | Admitting: UROLOGY
Payer: COMMERCIAL

## 2024-02-08 ENCOUNTER — ANESTHESIA (OUTPATIENT)
Dept: SURGERY | Facility: HOSPITAL | Age: 74
End: 2024-02-08
Payer: COMMERCIAL

## 2024-02-08 VITALS
OXYGEN SATURATION: 100 % | DIASTOLIC BLOOD PRESSURE: 53 MMHG | WEIGHT: 113.19 LBS | SYSTOLIC BLOOD PRESSURE: 153 MMHG | BODY MASS INDEX: 22.22 KG/M2 | TEMPERATURE: 98 F | HEIGHT: 60 IN | RESPIRATION RATE: 16 BRPM | HEART RATE: 61 BPM

## 2024-02-08 DIAGNOSIS — N20.0 RIGHT RENAL STONE: ICD-10-CM

## 2024-02-08 DIAGNOSIS — N20.1 RIGHT URETERAL STONE: ICD-10-CM

## 2024-02-08 DIAGNOSIS — R52 PAIN: ICD-10-CM

## 2024-02-08 PROBLEM — E87.20 METABOLIC ACIDOSIS: Status: RESOLVED | Noted: 2023-06-07 | Resolved: 2024-02-08

## 2024-02-08 PROBLEM — F17.200 SMOKER: Status: RESOLVED | Noted: 2021-03-25 | Resolved: 2024-02-08

## 2024-02-08 PROBLEM — Z93.2 ILEOSTOMY STATUS (HCC): Status: RESOLVED | Noted: 2021-03-25 | Resolved: 2024-02-08

## 2024-02-08 PROBLEM — K46.9 ABDOMINAL HERNIA: Status: RESOLVED | Noted: 2022-10-04 | Resolved: 2024-02-08

## 2024-02-08 PROCEDURE — 0TC08ZZ EXTIRPATION OF MATTER FROM RIGHT KIDNEY, VIA NATURAL OR ARTIFICIAL OPENING ENDOSCOPIC: ICD-10-PCS | Performed by: UROLOGY

## 2024-02-08 PROCEDURE — 0T768DZ DILATION OF RIGHT URETER WITH INTRALUMINAL DEVICE, VIA NATURAL OR ARTIFICIAL OPENING ENDOSCOPIC: ICD-10-PCS | Performed by: UROLOGY

## 2024-02-08 PROCEDURE — 0TC68ZZ EXTIRPATION OF MATTER FROM RIGHT URETER, VIA NATURAL OR ARTIFICIAL OPENING ENDOSCOPIC: ICD-10-PCS | Performed by: UROLOGY

## 2024-02-08 PROCEDURE — 88300 SURGICAL PATH GROSS: CPT | Performed by: UROLOGY

## 2024-02-08 PROCEDURE — 82365 CALCULUS SPECTROSCOPY: CPT | Performed by: UROLOGY

## 2024-02-08 DEVICE — ASCERTA STENT URET 24CM 6FR: Type: IMPLANTABLE DEVICE | Site: URETER | Status: FUNCTIONAL

## 2024-02-08 RX ORDER — HYDROCODONE BITARTRATE AND ACETAMINOPHEN 5; 325 MG/1; MG/1
2 TABLET ORAL ONCE AS NEEDED
Status: COMPLETED | OUTPATIENT
Start: 2024-02-08 | End: 2024-02-08

## 2024-02-08 RX ORDER — ACETAMINOPHEN 500 MG
1000 TABLET ORAL ONCE
Status: DISCONTINUED | OUTPATIENT
Start: 2024-02-08 | End: 2024-02-08 | Stop reason: HOSPADM

## 2024-02-08 RX ORDER — NALOXONE HYDROCHLORIDE 0.4 MG/ML
80 INJECTION, SOLUTION INTRAMUSCULAR; INTRAVENOUS; SUBCUTANEOUS AS NEEDED
Status: DISCONTINUED | OUTPATIENT
Start: 2024-02-08 | End: 2024-02-08

## 2024-02-08 RX ORDER — ACETAMINOPHEN 500 MG
1000 TABLET ORAL ONCE AS NEEDED
Status: COMPLETED | OUTPATIENT
Start: 2024-02-08 | End: 2024-02-08

## 2024-02-08 RX ORDER — HYDROMORPHONE HYDROCHLORIDE 1 MG/ML
0.6 INJECTION, SOLUTION INTRAMUSCULAR; INTRAVENOUS; SUBCUTANEOUS EVERY 5 MIN PRN
Status: DISCONTINUED | OUTPATIENT
Start: 2024-02-08 | End: 2024-02-08

## 2024-02-08 RX ORDER — HYDROMORPHONE HYDROCHLORIDE 1 MG/ML
0.2 INJECTION, SOLUTION INTRAMUSCULAR; INTRAVENOUS; SUBCUTANEOUS EVERY 5 MIN PRN
Status: DISCONTINUED | OUTPATIENT
Start: 2024-02-08 | End: 2024-02-08

## 2024-02-08 RX ORDER — SODIUM CHLORIDE, SODIUM LACTATE, POTASSIUM CHLORIDE, CALCIUM CHLORIDE 600; 310; 30; 20 MG/100ML; MG/100ML; MG/100ML; MG/100ML
INJECTION, SOLUTION INTRAVENOUS CONTINUOUS
Status: DISCONTINUED | OUTPATIENT
Start: 2024-02-08 | End: 2024-02-08

## 2024-02-08 RX ORDER — NICOTINE POLACRILEX 4 MG
30 LOZENGE BUCCAL
Status: DISCONTINUED | OUTPATIENT
Start: 2024-02-08 | End: 2024-02-08

## 2024-02-08 RX ORDER — ONDANSETRON 2 MG/ML
INJECTION INTRAMUSCULAR; INTRAVENOUS AS NEEDED
Status: DISCONTINUED | OUTPATIENT
Start: 2024-02-08 | End: 2024-02-08 | Stop reason: SURG

## 2024-02-08 RX ORDER — CEFAZOLIN SODIUM/WATER 2 G/20 ML
2 SYRINGE (ML) INTRAVENOUS ONCE
Status: COMPLETED | OUTPATIENT
Start: 2024-02-08 | End: 2024-02-08

## 2024-02-08 RX ORDER — CIPROFLOXACIN 500 MG/1
500 TABLET, FILM COATED ORAL 2 TIMES DAILY
Qty: 8 TABLET | Refills: 0 | Status: SHIPPED | OUTPATIENT
Start: 2024-02-08 | End: 2024-02-12

## 2024-02-08 RX ORDER — ONDANSETRON 2 MG/ML
4 INJECTION INTRAMUSCULAR; INTRAVENOUS EVERY 6 HOURS PRN
Status: DISCONTINUED | OUTPATIENT
Start: 2024-02-08 | End: 2024-02-08

## 2024-02-08 RX ORDER — METOCLOPRAMIDE HYDROCHLORIDE 5 MG/ML
10 INJECTION INTRAMUSCULAR; INTRAVENOUS EVERY 8 HOURS PRN
Status: DISCONTINUED | OUTPATIENT
Start: 2024-02-08 | End: 2024-02-08

## 2024-02-08 RX ORDER — LABETALOL HYDROCHLORIDE 5 MG/ML
5 INJECTION, SOLUTION INTRAVENOUS EVERY 5 MIN PRN
Status: DISCONTINUED | OUTPATIENT
Start: 2024-02-08 | End: 2024-02-08

## 2024-02-08 RX ORDER — HYDROMORPHONE HYDROCHLORIDE 1 MG/ML
0.4 INJECTION, SOLUTION INTRAMUSCULAR; INTRAVENOUS; SUBCUTANEOUS EVERY 5 MIN PRN
Status: DISCONTINUED | OUTPATIENT
Start: 2024-02-08 | End: 2024-02-08

## 2024-02-08 RX ORDER — HYDROCODONE BITARTRATE AND ACETAMINOPHEN 5; 325 MG/1; MG/1
1 TABLET ORAL EVERY 6 HOURS PRN
Qty: 30 TABLET | Refills: 0 | Status: SHIPPED | OUTPATIENT
Start: 2024-02-08

## 2024-02-08 RX ORDER — MEPERIDINE HYDROCHLORIDE 25 MG/ML
12.5 INJECTION INTRAMUSCULAR; INTRAVENOUS; SUBCUTANEOUS AS NEEDED
Status: DISCONTINUED | OUTPATIENT
Start: 2024-02-08 | End: 2024-02-08

## 2024-02-08 RX ORDER — METOCLOPRAMIDE HYDROCHLORIDE 5 MG/ML
INJECTION INTRAMUSCULAR; INTRAVENOUS AS NEEDED
Status: DISCONTINUED | OUTPATIENT
Start: 2024-02-08 | End: 2024-02-08 | Stop reason: SURG

## 2024-02-08 RX ORDER — NICOTINE POLACRILEX 4 MG
15 LOZENGE BUCCAL
Status: DISCONTINUED | OUTPATIENT
Start: 2024-02-08 | End: 2024-02-08

## 2024-02-08 RX ORDER — HYDROCODONE BITARTRATE AND ACETAMINOPHEN 5; 325 MG/1; MG/1
1 TABLET ORAL ONCE AS NEEDED
Status: COMPLETED | OUTPATIENT
Start: 2024-02-08 | End: 2024-02-08

## 2024-02-08 RX ORDER — GLYCOPYRROLATE 0.2 MG/ML
INJECTION, SOLUTION INTRAMUSCULAR; INTRAVENOUS AS NEEDED
Status: DISCONTINUED | OUTPATIENT
Start: 2024-02-08 | End: 2024-02-08 | Stop reason: SURG

## 2024-02-08 RX ORDER — DOCUSATE SODIUM 100 MG/1
100 CAPSULE, LIQUID FILLED ORAL 2 TIMES DAILY
Qty: 28 CAPSULE | Refills: 0 | Status: SHIPPED | OUTPATIENT
Start: 2024-02-08 | End: 2024-02-22

## 2024-02-08 RX ORDER — DIPHENHYDRAMINE HYDROCHLORIDE 50 MG/ML
12.5 INJECTION INTRAMUSCULAR; INTRAVENOUS AS NEEDED
Status: DISCONTINUED | OUTPATIENT
Start: 2024-02-08 | End: 2024-02-08

## 2024-02-08 RX ORDER — LIDOCAINE HYDROCHLORIDE 10 MG/ML
INJECTION, SOLUTION EPIDURAL; INFILTRATION; INTRACAUDAL; PERINEURAL AS NEEDED
Status: DISCONTINUED | OUTPATIENT
Start: 2024-02-08 | End: 2024-02-08 | Stop reason: SURG

## 2024-02-08 RX ORDER — DEXTROSE MONOHYDRATE 25 G/50ML
50 INJECTION, SOLUTION INTRAVENOUS
Status: DISCONTINUED | OUTPATIENT
Start: 2024-02-08 | End: 2024-02-08

## 2024-02-08 RX ADMIN — METOCLOPRAMIDE HYDROCHLORIDE 10 MG: 5 INJECTION INTRAMUSCULAR; INTRAVENOUS at 10:03:00

## 2024-02-08 RX ADMIN — LIDOCAINE HYDROCHLORIDE 100 MG: 10 INJECTION, SOLUTION EPIDURAL; INFILTRATION; INTRACAUDAL; PERINEURAL at 10:06:00

## 2024-02-08 RX ADMIN — ONDANSETRON 4 MG: 2 INJECTION INTRAMUSCULAR; INTRAVENOUS at 10:51:00

## 2024-02-08 RX ADMIN — SODIUM CHLORIDE, SODIUM LACTATE, POTASSIUM CHLORIDE, CALCIUM CHLORIDE: 600; 310; 30; 20 INJECTION, SOLUTION INTRAVENOUS at 10:01:00

## 2024-02-08 RX ADMIN — GLYCOPYRROLATE 0.3 MG: 0.2 INJECTION, SOLUTION INTRAMUSCULAR; INTRAVENOUS at 10:15:00

## 2024-02-08 RX ADMIN — CEFAZOLIN SODIUM/WATER 2 G: 2 G/20 ML SYRINGE (ML) INTRAVENOUS at 10:07:00

## 2024-02-08 NOTE — ANESTHESIA PROCEDURE NOTES
Airway  Date/Time: 2/8/2024 10:06 AM  Urgency: elective      General Information and Staff    Patient location during procedure: OR  Anesthesiologist: Albert Romero MD  Performed: anesthesiologist   Performed by: Albert Romero MD  Authorized by: Albert Romero MD      Indications and Patient Condition  Indications for airway management: anesthesia  Spontaneous Ventilation: absent  Sedation level: deep  Preoxygenated: yes  Patient position: sniffing  Mask difficulty assessment: 1 - vent by mask    Final Airway Details  Final airway type: supraglottic airway      Successful airway: classic  Size 3       Number of attempts at approach: 1  Number of other approaches attempted: 0

## 2024-02-08 NOTE — OPERATIVE REPORT
Urology Operative Note    Attending Surgeon: Joao Marie MD    Patient Name: Mae Mark    Date of Surgery: 2/8/2024    Preoperative Diagnosis: right sided kidney and ureteral stones    Postoperative Diagnosis: same    Procedure Performed: Cystoscopy, right flexible ureteroscopy with Holmium laser lithotripsy and basket extraction of stone fragments, right ureteral stent insertion    Indication for procedure:  Patient is a 73 year old female who presented with right sided kidney and ureteral stones. The patient was counseled on options and elected to undergo the aforementioned procedure. We discussed the risks and benefits to surgery. We discussed risks including, but not limited to, bleeding, infection, possible damage to surrounding structures, possible need for repeat procedure(s). The patient understood these risks and wished to proceed with surgery.  Description of the procedure:  The patient was taken to the operating room and prepped and draped in lithotomy position after undergoing general anesthesia. The cystoscope was inserted and the bladder was inspected and drained. The right ureter was cannulated with a Glidewire and the wire was passed up into the renal pelvis which I confirmed using fluoroscopy. I then inserted a flexible sheath and a second wire as a safety wire. I then reinserted the flexible sheath. We then proceeded with flexible ureteroscopy using the [employer] ureteroscope.  I was easily able to identify the stone burden with the largest stone measuring ~ 8 mm. We were able to fragment the stone easily using the Holmium laser and the stone fragments were removed.  I carefully inspected all the renal calices and the ureter in its entirety. No significant residual stone burden was remaining. I then removed the flexible scope and sheath. I inserted a 6 x 26 cm JJ ureteral stent over the safety wire.   I confirmed there was good curl of stent in the kidney using fluoroscopy as well as good  curl of stent in the bladder using direct cystoscopic examination.   The bladder was drained and the scope was removed.  The stone fragments were sent for analysis.   The patient was awoken having tolerated the procedure well.    Specimens: Stone fragments    Complications: No known complications.    Condition on Discharge from the operating room was stable    Plan: The patient will follow up in the office for stent removal.    Joao Marie MD    Date: 2/8/2024 Time: 10:56 AM

## 2024-02-08 NOTE — DISCHARGE INSTRUCTIONS
You had a procedure called a CYSTOSCOPY today    - You may resume regular activity tomorrow.    - You may have a post-operative appointment that is already scheduled for you. If the appointment date or time does not work with your schedule please call our office to reschedule ([phone]). Alternatively our office may be reaching out to you to schedule the appointment.     - You may experience pain after the procedure for 1-2 days.  If pain becomes intolerable please contact our office or go to the nearest Emergency Room/Immediate Care. You make take over-the counter ibuprofen for mild pain (provided you do not have a medical condition such as stomach ulcers or kidney disease which prohibits you from taking). You may take this in addition to tylenol or narcotic pain medication. Hot packs may help for discomfort as well.    - If you are medically allowed to take ibuprofen then you may take 200-400 mg every 8 hours as needed for pain with plenty of fluids. You may take this in addition to tylenol or other pain medication which may be prescribed.     - You may experience burning with urination and frequency of urination over the next few days.     - You may see blood in your urine that should clear up within a few days. If you have a stent in place then you may see blood in the urine until the stent is eventually removed.     - Try to abstain from alcohol, coffee, tea, artificial sweeteners, and spicy food for the next 48 hours as these can irritate the bladder.     - If you develop a fever, chills, difficulty urinating or abdominal pain in the next 24 hours, call the office.     - Drink 1.5 to 2 liters of fluid today, water is preferable. If you are on a fluid restriction due to other medical reasons then you need to adhere to your fluid restriction recommendations.      You received Norco 5mg/325mg at 11:50 AM

## 2024-02-08 NOTE — ANESTHESIA POSTPROCEDURE EVALUATION
[facility]    Mae Mark Patient Status:  Hospital Outpatient Surgery   Age/Gender 73 year old female MRN [id number]   Location [facility] POST ANESTHESIA CARE UNIT Attending Joao Marie MD   Hosp Day # 0 PCP Mahi Valentino MD       Anesthesia Post-op Note    CYSTOSCOPY,  RIGHT FLEXIBLE URETEROSCOPY, LASER LITHOTRIPSY, STONE REMOVAL WITH STENT PLACEMENT    Procedure Summary       Date: 02/08/24 Room / Location:  MAIN OR 07 /  MAIN OR    Anesthesia Start: 1001 Anesthesia Stop: 1100    Procedure: CYSTOSCOPY,  RIGHT FLEXIBLE URETEROSCOPY, LASER LITHOTRIPSY, STONE REMOVAL WITH STENT PLACEMENT (Right: Ureter) Diagnosis:       Right ureteral stone      Right renal stone      (Right ureteral stone [N20.1]Right renal stone [N20.0])    Surgeons: Joao Marie MD Anesthesiologist: Albert Romero MD    Anesthesia Type: general ASA Status: 2            Anesthesia Type: general    Vitals Value Taken Time   /71 02/08/24 1112   Temp 98.8 °F (37.1 °C) 02/08/24 1100   Pulse 68 02/08/24 1112   Resp 20 02/08/24 1112   SpO2 100 % 02/08/24 1112   Vitals shown include unfiled device data.    Patient Location: PACU    Anesthesia Type: general    Airway Patency: patent    Postop Pain Control: adequate    Mental Status: preanesthetic baseline    Nausea/Vomiting: none    Cardiopulmonary/Hydration status: stable euvolemic    Complications: no apparent anesthesia related complications    Postop vital signs: stable    Dental Exam: Unchanged from Preop    Patient to be discharged from PACU when criteria met.

## 2024-02-08 NOTE — INTERVAL H&P NOTE
Pre-op Diagnosis: Right ureteral stone [N20.1]  Right renal stone [N20.0]    The above referenced H&P was reviewed by Joao Marie MD on 2/8/2024, the patient was examined and no significant changes have occurred in the patient's condition since the H&P was performed.  I discussed with the patient and/or legal representative the potential benefits, risks and side effects of this procedure; the likelihood of the patient achieving goals; and potential problems that might occur during recuperation.  I discussed reasonable alternatives to the procedure, including risks, benefits and side effects related to the alternatives and risks related to not receiving this procedure.  We will proceed with procedure as planned.

## 2024-02-08 NOTE — ANESTHESIA PREPROCEDURE EVALUATION
PRE-OP EVALUATION    Patient Name: Mae Mark    Admit Diagnosis: Right ureteral stone [N20.1]  Right renal stone [N20.0]    Pre-op Diagnosis: Right ureteral stone [N20.1]  Right renal stone [N20.0]    CYSTOSCOPY, POSSIBLE BLADDER BIOPSY, RIGHT FLEXIBLE URETEROSCOPY, LASER LITHOTRIPSY, STONE REMOVAL WITH STENT PLACEMENT    Anesthesia Procedure: CYSTOSCOPY, POSSIBLE BLADDER BIOPSY, RIGHT FLEXIBLE URETEROSCOPY, LASER LITHOTRIPSY, STONE REMOVAL WITH STENT PLACEMENT (Right: Ureter)    Surgeon(s) and Role:     * Joao Marie MD - Primary    Pre-op vitals reviewed.  Temp: 98.1 °F (36.7 °C)  Pulse: 53  Resp: 16  BP: 137/54  SpO2: 100 %  Body mass index is 22.11 kg/m².    Current medications reviewed.  Hospital Medications:   [MAR Hold] acetaminophen (Tylenol Extra Strength) tab 1,000 mg  1,000 mg Oral Once    lactated ringers infusion   Intravenous Continuous    [COMPLETED] ceFAZolin (Ancef) 2 g in 20mL IV syringe premix  2 g Intravenous Once       Outpatient Medications:     Facility-Administered Medications Prior to Admission   Medication Dose Route Frequency Provider Last Rate Last Admin    [COMPLETED] cyanocobalamin (Vitamin B12) 1000 MCG/ML injection 1,000 mcg  1,000 mcg Intramuscular Once Mahi Valentino MD   1,000 mcg at 01/29/24 1024     Medications Prior to Admission   Medication Sig Dispense Refill Last Dose    ondansetron (ZOFRAN) 4 mg tablet Take 1 tablet (4 mg total) by mouth every 8 (eight) hours as needed for Nausea. 60 tablet 0 Past Week    metRONIDAZOLE 0.75 % External Cream apply TO AFFECTED AREA ON face EVERY DAY BEFORE NOON   2/6/2024    pimecrolimus 1 % External Cream apply topically TWICE DAILY TO affected AREAS   2/6/2024    Sulfacetamide Sodium-Sulfur 10-5 % External Liquid Apply 1 Application topically 2 (two) times daily.   2/6/2024    triamcinolone 0.1 % External Cream APPLY TO THE AFFECTED AREA(S) ON BACK TWICE DAILY FOR UP TO TWO WEEKS. AVOID face, armpits AND groin   2/6/2024     predniSONE 5 MG Oral Tab Take 8 tablets (40 mg total) by mouth daily with breakfast. 240 tablet 1 2/7/2024 at 0600    Tiotropium Bromide Monohydrate (SPIRIVA RESPIMAT) 2.5 MCG/ACT Inhalation Aero Soln Inhale 2 puffs into the lungs daily. 1 each 3 Past Month    acetaminophen 500 MG Oral Tab Take 1 tablet (500 mg total) by mouth every 6 (six) hours as needed for Pain.   2/8/2024 at 0530    Omeprazole 40 MG Oral Capsule Delayed Release Take 1 capsule (40 mg total) by mouth daily. (Patient taking differently: Take 1 capsule (40 mg total) by mouth every other day.) 90 capsule 3 2/7/2024 at 0600    albuterol (VENTOLIN HFA) 108 (90 Base) MCG/ACT Inhalation Aero Soln Inhale 2 puffs into the lungs every 6 (six) hours as needed for Wheezing. 1 each 3 More than a month       Allergies: Mold spores      Anesthesia Evaluation    Patient summary reviewed.    Anesthetic Complications  (-) history of anesthetic complications         GI/Hepatic/Renal  Comment: Kidney stone    (+) GERD and well controlled                 (+) Crohn's disease         Cardiovascular    Negative cardiovascular ROS.    Exercise tolerance: good     MET: >4                             (-) angina     (-) MOORE  (-) orthopnea  (-) PND     Endo/Other      (-) diabetes                            Pulmonary      (+) asthma  (+) COPD and mild  COPD not requiring home oxygen.    (-) shortness of breath  (-) recent URI          Neuro/Psych                   (+) headaches                   Past Surgical History:   Procedure Laterality Date    APPENDECTOMY      removed during ileostomy?    CATARACT Left 09/2018    CATARACT Right 10/2018    CHOLECYSTECTOMY      COLECTOMY      COLONOSCOPY  ?    over 10 years    CORRECT BUNION,SIMPLE      HAND/FINGER SURGERY UNLISTED Right     trigger thumb release    HAND/FINGER SURGERY UNLISTED Left 06/19/2014    A1 Pulley release    PART REMOVAL COLON W END COLOSTOMY      colon resection w/ileostomy    REVISION OF ILEOSTOMY,SIMPLE        Social History     Socioeconomic History    Marital status:    Tobacco Use    Smoking status: Every Day     Packs/day: 0.50     Years: 50.00     Additional pack years: 0.00     Total pack years: 25.00     Types: Cigarettes     Last attempt to quit: 2023     Years since quittin.5    Smokeless tobacco: Never    Tobacco comments:     tried Chantix three times-after about 2 months I became depressed and irritable.   Vaping Use    Vaping Use: Never used   Substance and Sexual Activity    Alcohol use: Not Currently     Comment: very rarely consume alcohol    Drug use: Yes     Types: Cannabis    Sexual activity: Not Currently     History   Drug Use    Types: Cannabis     Available pre-op labs reviewed.     Lab Results   Component Value Date     (L) 2023    K 4.2 2023     2023    CO2 20.0 (L) 2023    BUN 24 (H) 2023    CREATSERUM 1.34 (H) 2023     (H) 2023    CA 9.3 2023            Airway      Mallampati: I  Mouth opening: 3 FB  TM distance: 4 - 6 cm  Neck ROM: full Cardiovascular    Cardiovascular exam normal.  Rhythm: regular  Rate: normal  (-) murmur   Dental    Dentition appears grossly intact         Pulmonary    Pulmonary exam normal.  Breath sounds clear to auscultation bilaterally.        (-) wheezes       Other findings              ASA: 2   Plan: general  NPO status verified and patient meets guidelines.          Plan/risks discussed with: patient              We discussed GA w/LMA or ETT and possible scratchy throat and rarely dental damage.  We discussed analgesic plan and PONV prophylaxis.  The patient's questions were answered and consent was attained.

## 2024-02-10 ENCOUNTER — TELEPHONE (OUTPATIENT)
Dept: SURGERY | Facility: CLINIC | Age: 74
End: 2024-02-10

## 2024-02-10 NOTE — TELEPHONE ENCOUNTER
Patient status post ureteroscopy last week.  Called this morning with complaints of some fullness around her ileostomy.  She is unclear if this is gas related or related to her history of parastomal hernia.  She has no other symptoms. No urinary or urologic complaint.   She does think she has normal output from her ileostomy but she is unsure.  I told her  it is it is hard for me to evaluate something like this over the phone and she should come to ED for evaluation. She voiced her understanding and will ocnsider this.

## 2024-02-12 ENCOUNTER — TELEPHONE (OUTPATIENT)
Dept: SURGERY | Facility: CLINIC | Age: 74
End: 2024-02-12

## 2024-02-12 NOTE — TELEPHONE ENCOUNTER
Per pt calling stating  had some fullness around her ileostomy.  Per pt it has going down already but pt would like to know if is normal? Per pt she is feeling better now but she would like to inform Dr Marie about it  She has no other symptoms

## 2024-02-12 NOTE — TELEPHONE ENCOUNTER
Called pt to discuss below.   Recommended pt follow up with PCP in regards to abnormalities with stoma.  Pt declines at this present time.   Will follow up as planned.

## 2024-02-14 ENCOUNTER — PROCEDURE (OUTPATIENT)
Dept: SURGERY | Facility: CLINIC | Age: 74
End: 2024-02-14

## 2024-02-14 DIAGNOSIS — T19.1XXA FOREIGN BODY IN BLADDER, INITIAL ENCOUNTER: ICD-10-CM

## 2024-02-14 DIAGNOSIS — N20.0 RECURRENT KIDNEY STONES: Primary | ICD-10-CM

## 2024-02-14 DIAGNOSIS — N18.9 CHRONIC KIDNEY DISEASE, UNSPECIFIED CKD STAGE: ICD-10-CM

## 2024-02-14 PROCEDURE — 99213 OFFICE O/P EST LOW 20 MIN: CPT | Performed by: UROLOGY

## 2024-02-14 PROCEDURE — 52310 CYSTOSCOPY AND TREATMENT: CPT | Performed by: UROLOGY

## 2024-02-14 RX ORDER — CIPROFLOXACIN 500 MG/1
500 TABLET, FILM COATED ORAL ONCE
Status: COMPLETED | OUTPATIENT
Start: 2024-02-14 | End: 2024-02-14

## 2024-02-14 RX ADMIN — CIPROFLOXACIN 500 MG: 500 TABLET, FILM COATED ORAL at 08:09:00

## 2024-02-16 LAB
CAOX MONOHYDRATE: 100 %
WEIGHT-STONE: 113 MG

## 2024-02-25 PROCEDURE — 82507 ASSAY OF CITRATE: CPT

## 2024-02-25 PROCEDURE — 83735 ASSAY OF MAGNESIUM: CPT

## 2024-02-25 PROCEDURE — 82436 ASSAY OF URINE CHLORIDE: CPT

## 2024-02-25 PROCEDURE — 84105 ASSAY OF URINE PHOSPHORUS: CPT

## 2024-02-25 PROCEDURE — 84133 ASSAY OF URINE POTASSIUM: CPT

## 2024-02-25 PROCEDURE — 83945 ASSAY OF OXALATE: CPT

## 2024-02-25 PROCEDURE — 82570 ASSAY OF URINE CREATININE: CPT

## 2024-02-25 PROCEDURE — 84560 ASSAY OF URINE/URIC ACID: CPT

## 2024-02-25 PROCEDURE — 36415 COLL VENOUS BLD VENIPUNCTURE: CPT

## 2024-02-25 PROCEDURE — 84300 ASSAY OF URINE SODIUM: CPT

## 2024-02-25 PROCEDURE — 83986 ASSAY PH BODY FLUID NOS: CPT

## 2024-02-25 PROCEDURE — 83935 ASSAY OF URINE OSMOLALITY: CPT

## 2024-02-25 PROCEDURE — 84392 ASSAY OF URINE SULFATE: CPT

## 2024-02-25 PROCEDURE — 82340 ASSAY OF CALCIUM IN URINE: CPT

## 2024-02-26 ENCOUNTER — LAB ENCOUNTER (OUTPATIENT)
Dept: LAB | Age: 74
End: 2024-02-26
Attending: UROLOGY
Payer: COMMERCIAL

## 2024-02-26 DIAGNOSIS — N20.0 RECURRENT KIDNEY STONES: ICD-10-CM

## 2024-02-28 ENCOUNTER — LAB ENCOUNTER (OUTPATIENT)
Dept: LAB | Age: 74
End: 2024-02-28
Attending: INTERNAL MEDICINE
Payer: COMMERCIAL

## 2024-02-28 ENCOUNTER — VIRTUAL PHONE E/M (OUTPATIENT)
Dept: SURGERY | Facility: CLINIC | Age: 74
End: 2024-02-28

## 2024-02-28 DIAGNOSIS — R73.09 ELEVATED GLUCOSE: ICD-10-CM

## 2024-02-28 DIAGNOSIS — R82.991 HYPOCITRATURIA: ICD-10-CM

## 2024-02-28 DIAGNOSIS — N20.0 RECURRENT KIDNEY STONES: Primary | ICD-10-CM

## 2024-02-28 DIAGNOSIS — N28.9 RENAL INSUFFICIENCY: ICD-10-CM

## 2024-02-28 LAB
ANION GAP SERPL CALC-SCNC: 4 MMOL/L (ref 0–18)
BUN BLD-MCNC: 34 MG/DL (ref 9–23)
CALCIUM BLD-MCNC: 9.7 MG/DL (ref 8.5–10.1)
CHLORIDE SERPL-SCNC: 108 MMOL/L (ref 98–112)
CO2 SERPL-SCNC: 23 MMOL/L (ref 21–32)
CREAT BLD-MCNC: 0.97 MG/DL
EGFRCR SERPLBLD CKD-EPI 2021: 62 ML/MIN/1.73M2 (ref 60–?)
EST. AVERAGE GLUCOSE BLD GHB EST-MCNC: 157 MG/DL (ref 68–126)
FASTING STATUS PATIENT QL REPORTED: YES
GLUCOSE BLD-MCNC: 77 MG/DL (ref 70–99)
HBA1C MFR BLD: 7.1 % (ref ?–5.7)
OSMOLALITY SERPL CALC.SUM OF ELEC: 286 MOSM/KG (ref 275–295)
POTASSIUM SERPL-SCNC: 5.1 MMOL/L (ref 3.5–5.1)
SODIUM SERPL-SCNC: 135 MMOL/L (ref 136–145)

## 2024-02-28 PROCEDURE — 3051F HG A1C>EQUAL 7.0%<8.0%: CPT | Performed by: INTERNAL MEDICINE

## 2024-02-28 PROCEDURE — 99443 PHONE E/M BY PHYS 21-30 MIN: CPT | Performed by: UROLOGY

## 2024-02-28 PROCEDURE — 83036 HEMOGLOBIN GLYCOSYLATED A1C: CPT | Performed by: INTERNAL MEDICINE

## 2024-02-28 PROCEDURE — 80048 BASIC METABOLIC PNL TOTAL CA: CPT | Performed by: INTERNAL MEDICINE

## 2024-02-28 RX ORDER — POTASSIUM CITRATE 15 MEQ/1
1 TABLET, EXTENDED RELEASE ORAL 2 TIMES DAILY
Qty: 180 TABLET | Refills: 5 | Status: SHIPPED | OUTPATIENT
Start: 2024-02-28 | End: 2024-02-28

## 2024-02-28 RX ORDER — POTASSIUM CITRATE 15 MEQ/1
1 TABLET, EXTENDED RELEASE ORAL 2 TIMES DAILY
Qty: 180 TABLET | Refills: 5 | Status: SHIPPED | OUTPATIENT
Start: 2024-02-28

## 2024-02-28 NOTE — PROGRESS NOTES
Virtual Telephone Check-In    Mae Mark verbally consents to a Virtual Telephone Check-In service today.  Patient understands and accepts financial responsibility for any deductible, co-insurance and/or co-pays associated with this service.    Duration of the service including review of pertinent imaging/labs and coordination of care: 26 minutes    Summary of topics discussed:  See below    HPI:     Mae Mark is a 73 year old female with a PMH of anxiety, HL, DM, PERLA, Crohn's s/p total colectomy + ileostomy (1979), partial SBR, OA.  Following for:  1. Recurrent kidney stones  - right ureteral stone incidentally noted on imaging. S/p right URS 2/8/24. Known left renal stones.  - no prior episodes  2. Severe hypocitraturia + low UOP  - 24 h urine 2/25/24: very low UOP, citrate (1175, < 23)  3. AMH     PCP - Chicho    Presents for check-up, review 24 h urine, discuss stone prevention.    She feels well. No pain or complaints since LOV.    CT showed right ureteral stone had been present since at least Nov 2023.    Reported ~ 20-30 oz water, 12 coffee with medium to light yellow urine.     UTI hx: none  Gross hematuria: episode ~ 30 y ago, nothing since  Tobacco hx: ~ 80 pack years, currently 1/2 PPD  Fam h/o  malignancy: none    UA is negative for infection    CT SP 2/2/24: 2 RUP, 4 RMP, 7-8 R prox ureter. 3 LUP, 4, 4 LLP  CT AP 11/24/23: 7 proximal right ureteral, 4 RMP. 74 x 78 mm left renal cyst  CT SP 7/15/23: 4 RMP, 8 RLP    We again discussed stone prevention strategies at today's visit and I provided and reviewed educational materials for this. I recommend drinking at least 40-60 ounces of water per day or enough water to keep urine clear. I also recommend the patient avoid a high sodium diet.    Given 24 h urine I recommend she double current water intake and start 15 urocit BID. Discussed rationale for this and she is agreeable.    She wants to observe the left renal stones for now but  may consider URS later. Starting 15 urocit BID and will try to double water intake for stone prevention. She requests nephrology referral for mild CKD. F/u in 6 mo with CT prior.    Prior note:  If she has passed stone I would still recommend cystoscopy at some point given AMH in the past.    She will significantly increase water intake for stone prevention, checking CT SP and OR as noted above. She requests Nephrology referral for CKD. Can consider 24 h urine later but will discuss this at NOV.    HISTORY:  Past Medical History:   Diagnosis Date    Abdominal pain     years ago before ostomy    Anemia     occasional in the past    Anxiety     Arthritis     Asthma (HCC)     Atypical mole     Back pain     Black stools     years ago    Bleeding nose     Bloating     Blood in the stool     years ago    Blood in urine     Blurred vision     Calculus of kidney     Have just been told I have kidney stones by [facility] Doctor    Chest pain on exertion smoker    pressure    Chronic cough     Crohn's disease (HCC)     Diabetes mellitus (HCC)     Diarrhea, unspecified     Dizziness still a little weak    still not steady    Easy bruising     Fatigue     Food intolerance     Frequent urination     Headache disorder July 2023    from deyhdration?    Hearing impairment     hearing aids    Hearing loss winter 2022    Dr. Perea    Heartburn     Hemorrhoids     High cholesterol     Irregular bowel habits     chron's    Kidney failure     mild    Leg swelling     ankles-once in a while    Loss of appetite     Nausea     since January 2018-occasional    Night sweats     Osteoarthritis     hands, knees    Pain in joints     Painful swallowing March-April, 2023    not painful but uncomfortable    Painful urination     Prediabetes     Problems with swallowing April, 2023    Began taking Terbinafine March 22, 2023 for a toenail fungal infection. Began noticing irritated throat/top of gut were burning and swallowing becoming  uncomfortable. Also experiencing weakness, dehydration, nausea, taste buds gone, loss of appetite    Rash     Skin blushing/flushing     years ago with active crohn's/before ileostomy    Sleep disturbance     Sputum production     Stool incontinence     years ago    Stress     typical on occasion    Uncomfortable fullness after meals     Visual impairment     glasses    Wheezing       Past Surgical History:   Procedure Laterality Date    APPENDECTOMY      removed during ileostomy?    CATARACT Left 2018    CATARACT Right 10/2018    CHOLECYSTECTOMY      COLECTOMY      COLONOSCOPY  ?    over 10 years    CORRECT BUNION,SIMPLE      HAND/FINGER SURGERY UNLISTED Right     trigger thumb release    HAND/FINGER SURGERY UNLISTED Left 2014    A1 Pulley release    PART REMOVAL COLON W END COLOSTOMY      colon resection w/ileostomy    REVISION OF ILEOSTOMY,SIMPLE        Family History   Problem Relation Age of Onset    Diabetes Father         father    Cancer Father         lung, +smoker    Crohn's Disease Sister     Crohn's Disease Sister         Crohn's - her son also has Crohn's now, also my great niece has Crohn's    Diabetes Brother     Crohn's Disease Brother             Cancer Brother         lung cancer, +smoker    Breast Cancer Maternal Grandmother 80            Crohn's Disease Nephew     Crohn's Disease Other       Social History:   Social History     Socioeconomic History    Marital status:    Tobacco Use    Smoking status: Every Day     Packs/day: 0.50     Years: 50.00     Additional pack years: 0.00     Total pack years: 25.00     Types: Cigarettes     Last attempt to quit: 2023     Years since quittin.6    Smokeless tobacco: Never    Tobacco comments:     tried Chantix three times-after about 2 months I became depressed and irritable.   Vaping Use    Vaping Use: Never used   Substance and Sexual Activity    Alcohol use: Not Currently     Comment: very rarely consume alcohol     Drug use: Yes     Types: Cannabis    Sexual activity: Not Currently   Social History Narrative    , no children.  Retired, previously worked in administration.        Medications (Active prior to today's visit):  Current Outpatient Medications   Medication Sig Dispense Refill    Potassium Citrate ER (UROCIT-K 15) 15 MEQ (1620 MG) Oral Tab CR Take 1 tablet by mouth in the morning and 1 tablet before bedtime. 180 tablet 5    PREDNISONE 5 MG Oral Tab TAKE EIGHT TABLETS BY MOUTH EVERY DAY with breakfast 240 tablet 1    HYDROcodone-acetaminophen (NORCO) 5-325 MG Oral Tab Take 1 tablet by mouth every 6 (six) hours as needed for Pain. 30 tablet 0    ondansetron (ZOFRAN) 4 mg tablet Take 1 tablet (4 mg total) by mouth every 8 (eight) hours as needed for Nausea. 60 tablet 0    metRONIDAZOLE 0.75 % External Cream apply TO AFFECTED AREA ON face EVERY DAY BEFORE NOON      pimecrolimus 1 % External Cream apply topically TWICE DAILY TO affected AREAS      Sulfacetamide Sodium-Sulfur 10-5 % External Liquid Apply 1 Application topically 2 (two) times daily.      triamcinolone 0.1 % External Cream APPLY TO THE AFFECTED AREA(S) ON BACK TWICE DAILY FOR UP TO TWO WEEKS. AVOID face, armpits AND groin      albuterol (VENTOLIN HFA) 108 (90 Base) MCG/ACT Inhalation Aero Soln Inhale 2 puffs into the lungs every 6 (six) hours as needed for Wheezing. 1 each 3    Tiotropium Bromide Monohydrate (SPIRIVA RESPIMAT) 2.5 MCG/ACT Inhalation Aero Soln Inhale 2 puffs into the lungs daily. 1 each 3    acetaminophen 500 MG Oral Tab Take 1 tablet (500 mg total) by mouth every 6 (six) hours as needed for Pain.      Omeprazole 40 MG Oral Capsule Delayed Release Take 1 capsule (40 mg total) by mouth daily. 90 capsule 3       Allergies:  Allergies   Allergen Reactions    Mold Spores ASTHMA, ITCHING, Runny nose and WHEEZING         ROS:     A comprehensive 10 point review of systems was completed.  Pertinent positives and negatives noted in the the  HPI.    PHYSICAL EXAM:     GENERAL APPEARANCE: well, developed, well nourished, in no acute distress  NEUROLOGIC: nonfocal, alert and oriented  HEAD: normocephalic, atraumatic  EYES: sclera non-icteric  EARS: hearing intact  ORAL CAVITY: mucosa moist  NECK/THYROID: no obvious goiter or masses  LUNGS: nonlabored breathing  ABDOMEN: soft, no obvious masses or tenderness  SKIN: no obvious rashes    : as noted above     ASSESSMENT/PLAN:   Diagnoses and all orders for this visit:    Recurrent kidney stones    Hypocitraturia  -     Discontinue: Potassium Citrate ER (UROCIT-K 15) 15 MEQ (1620 MG) Oral Tab CR; Take 1 tablet by mouth in the morning and 1 tablet before bedtime.  -     Potassium Citrate ER (UROCIT-K 15) 15 MEQ (1620 MG) Oral Tab CR; Take 1 tablet by mouth in the morning and 1 tablet before bedtime.      - as noted above.    Thanks again for this consult.    Joao Marie MD, FACS  Urologist  [facility]  Office: [phone]

## 2024-02-29 ENCOUNTER — NURSE ONLY (OUTPATIENT)
Dept: INTERNAL MEDICINE CLINIC | Facility: CLINIC | Age: 74
End: 2024-02-29
Payer: COMMERCIAL

## 2024-02-29 DIAGNOSIS — E53.8 B12 DEFICIENCY: Primary | ICD-10-CM

## 2024-02-29 PROCEDURE — 96372 THER/PROPH/DIAG INJ SC/IM: CPT | Performed by: INTERNAL MEDICINE

## 2024-02-29 RX ORDER — CYANOCOBALAMIN 1000 UG/ML
1000 INJECTION, SOLUTION INTRAMUSCULAR; SUBCUTANEOUS ONCE
Status: COMPLETED | OUTPATIENT
Start: 2024-02-29 | End: 2024-02-29

## 2024-02-29 RX ADMIN — CYANOCOBALAMIN 1000 MCG: 1000 INJECTION, SOLUTION INTRAMUSCULAR; SUBCUTANEOUS at 11:17:00

## 2024-03-05 ENCOUNTER — OFFICE VISIT (OUTPATIENT)
Dept: INTERNAL MEDICINE CLINIC | Facility: CLINIC | Age: 74
End: 2024-03-05
Payer: COMMERCIAL

## 2024-03-05 VITALS
BODY MASS INDEX: 21.99 KG/M2 | HEART RATE: 67 BPM | TEMPERATURE: 97 F | DIASTOLIC BLOOD PRESSURE: 60 MMHG | WEIGHT: 118 LBS | RESPIRATION RATE: 18 BRPM | OXYGEN SATURATION: 97 % | SYSTOLIC BLOOD PRESSURE: 128 MMHG | HEIGHT: 61.25 IN

## 2024-03-05 DIAGNOSIS — E11.9 NEW ONSET TYPE 2 DIABETES MELLITUS (HCC): Primary | ICD-10-CM

## 2024-03-05 DIAGNOSIS — B36.9 FUNGAL RASH OF TORSO: ICD-10-CM

## 2024-03-05 DIAGNOSIS — R07.89 RIGHT-SIDED CHEST WALL PAIN: ICD-10-CM

## 2024-03-05 PROCEDURE — 99214 OFFICE O/P EST MOD 30 MIN: CPT | Performed by: INTERNAL MEDICINE

## 2024-03-05 PROCEDURE — 3078F DIAST BP <80 MM HG: CPT | Performed by: INTERNAL MEDICINE

## 2024-03-05 PROCEDURE — 3008F BODY MASS INDEX DOCD: CPT | Performed by: INTERNAL MEDICINE

## 2024-03-05 PROCEDURE — 3074F SYST BP LT 130 MM HG: CPT | Performed by: INTERNAL MEDICINE

## 2024-03-05 RX ORDER — CLOTRIMAZOLE AND BETAMETHASONE DIPROPIONATE 10; .64 MG/G; MG/G
CREAM TOPICAL
Qty: 60 G | Refills: 2 | Status: SHIPPED | OUTPATIENT
Start: 2024-03-05

## 2024-03-05 RX ORDER — GLIPIZIDE 2.5 MG/1
2.5 TABLET, EXTENDED RELEASE ORAL
Qty: 30 TABLET | Refills: 1 | Status: SHIPPED | OUTPATIENT
Start: 2024-03-05

## 2024-03-05 NOTE — PROGRESS NOTES
Mae Mark is a 73 year old female.    Chief Complaint   Patient presents with    Diabetes    Rash     Bilateral breast underneath     Back Pain     Saturday, right side.        HPI:     Patient with Crohns, CKD, asthma-COPD here for new onset diabetes, rash under breasts and right sided back pain for last few days  New onset dm2 with hgba1c of 7.1, she is on high dose prednisone and eating poorly so not surprised by conversion from pre dm to diabetes.No polyuria/polydipsia. No acute vision or foot complaints. Would like referral to optho for dilated eye exam  C/o rash under both breasts  for several days, she applied lotrisone I gave her in the past and it is helping. She would like refill today. Denies itching, more of a burning feeling under the breasts  C/o soreness on right back, bumped into something over the weekend. Small bruise there, no radiation of pain into legs        Patient Active Problem List   Diagnosis    Primary localized osteoarthrosis, hand    Arthritis    Crohn's disease (HCC)    Retroperitoneal lymphadenopathy    Elevated uric acid in blood    Asthma-COPD overlap syndrome    Tinnitus of both ears    Dry eyes    Degenerative disc disease, lumbar    Swollen lymph nodes    Acute kidney injury (HCC)    Azotemia    Hyperglycemia    Abdominal pain of unknown etiology    Hyponatremia    History of acute renal failure    Fungal infection of toenail    Odynophagia    Regional enteritis of small intestine with large intestine (HCC)    Elevated glucose    Renal insufficiency    New onset type 2 diabetes mellitus (HCC)    Fungal rash of torso    Right-sided chest wall pain     Current Outpatient Medications   Medication Sig Dispense Refill    clotrimazole-betamethasone 1-0.05 % External Cream Apply to affected area BID for 2 weeks 60 g 2    glipiZIDE ER 2.5 MG Oral Tablet 24 Hr Take 1 tablet (2.5 mg total) by mouth daily with breakfast. 30 tablet 1    Potassium Citrate ER (UROCIT-K 15) 15 MEQ (1620  MG) Oral Tab CR Take 1 tablet by mouth in the morning and 1 tablet before bedtime. 180 tablet 5    PREDNISONE 5 MG Oral Tab TAKE EIGHT TABLETS BY MOUTH EVERY DAY with breakfast 240 tablet 1    HYDROcodone-acetaminophen (NORCO) 5-325 MG Oral Tab Take 1 tablet by mouth every 6 (six) hours as needed for Pain. 30 tablet 0    ondansetron (ZOFRAN) 4 mg tablet Take 1 tablet (4 mg total) by mouth every 8 (eight) hours as needed for Nausea. 60 tablet 0    metRONIDAZOLE 0.75 % External Cream apply TO AFFECTED AREA ON face EVERY DAY BEFORE NOON      pimecrolimus 1 % External Cream apply topically TWICE DAILY TO affected AREAS      Sulfacetamide Sodium-Sulfur 10-5 % External Liquid Apply 1 Application topically 2 (two) times daily.      triamcinolone 0.1 % External Cream APPLY TO THE AFFECTED AREA(S) ON BACK TWICE DAILY FOR UP TO TWO WEEKS. AVOID face, armpits AND groin      albuterol (VENTOLIN HFA) 108 (90 Base) MCG/ACT Inhalation Aero Soln Inhale 2 puffs into the lungs every 6 (six) hours as needed for Wheezing. 1 each 3    Tiotropium Bromide Monohydrate (SPIRIVA RESPIMAT) 2.5 MCG/ACT Inhalation Aero Soln Inhale 2 puffs into the lungs daily. 1 each 3    acetaminophen 500 MG Oral Tab Take 1 tablet (500 mg total) by mouth every 6 (six) hours as needed for Pain.      Omeprazole 40 MG Oral Capsule Delayed Release Take 1 capsule (40 mg total) by mouth daily. 90 capsule 3      Past Medical History:   Diagnosis Date    Abdominal pain     years ago before ostomy    Anemia     occasional in the past    Anxiety     Arthritis     Asthma (HCC)     Atypical mole     Back pain     Black stools     years ago    Bleeding nose     Bloating     Blood in the stool     years ago    Blood in urine     Blurred vision     Calculus of kidney     Have just been told I have kidney stones by [facility] Doctor    Chest pain on exertion smoker    pressure    Chronic cough     Crohn's disease (HCC)     Diabetes mellitus (HCC)     Diarrhea,  unspecified     Dizziness still a little weak    still not steady    Easy bruising     Fatigue     Food intolerance     Frequent urination     Headache disorder 2023    from deyhdration?    Hearing impairment     hearing aids    Hearing loss winter 2022    Dr. Perea    Heartburn     Hemorrhoids     High cholesterol     Irregular bowel habits     chron's    Kidney failure     mild    Leg swelling     ankles-once in a while    Loss of appetite     Nausea     since 2018-occasional    Night sweats     Osteoarthritis     hands, knees    Pain in joints     Painful swallowing March-    not painful but uncomfortable    Painful urination     Prediabetes     Problems with swallowing     Began taking Terbinafine 2023 for a toenail fungal infection. Began noticing irritated throat/top of gut were burning and swallowing becoming uncomfortable. Also experiencing weakness, dehydration, nausea, taste buds gone, loss of appetite    Rash     Skin blushing/flushing     years ago with active crohn's/before ileostomy    Sleep disturbance     Sputum production     Stool incontinence     years ago    Stress     typical on occasion    Uncomfortable fullness after meals     Visual impairment     glasses    Wheezing       Social History:  Social History     Socioeconomic History    Marital status:    Tobacco Use    Smoking status: Every Day     Packs/day: 0.50     Years: 50.00     Additional pack years: 0.00     Total pack years: 25.00     Types: Cigarettes     Last attempt to quit: 2023     Years since quittin.6    Smokeless tobacco: Never    Tobacco comments:     tried Chantix three times-after about 2 months I became depressed and irritable.   Vaping Use    Vaping Use: Never used   Substance and Sexual Activity    Alcohol use: Not Currently     Comment: very rarely consume alcohol    Drug use: Yes     Types: Cannabis    Sexual activity: Not Currently   Social History Narrative     , no children.  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     Family History   Problem Relation Age of Onset    Diabetes Father         father    Cancer Father         lung, +smoker    Crohn's Disease Sister     Crohn's Disease Sister         Crohn's - her son also has Crohn's now, also my great niece has Crohn's    Diabetes Brother     Crohn's Disease Brother             Cancer Brother         lung cancer, +smoker    Breast Cancer Maternal Grandmother 80            Crohn's Disease Nephew     Crohn's Disease Other         Allergies  Allergies   Allergen Reactions    Mold Spores ASTHMA, ITCHING, Runny nose and WHEEZING         REVIEW OF SYSTEMS:   GENERAL HEALTH:  no fevers   RESPIRATORY: no cough  CARDIOVASCULAR: denies chest pain  GI: denies abdominal pain  : no dysuria  NEURO: denies headaches  PSYCH: No reported depression   HEME: No adenopathy      EXAM:   /60   Pulse 67   Temp 97.3 °F (36.3 °C)   Resp 18   Ht 5' 1.25\" (1.556 m)   Wt 118 lb (53.5 kg)   LMP  (LMP Unknown)   SpO2 97%   BMI 22.11 kg/m²   GENERAL: well developed, well nourished,in no apparent distress  HEENT: atraumatic, normocephalic  NECK: supple,no adenopathy  LUNGS: normal rate without respiratory distress, lungs clear to auscultation  CARDIO: RRR nl S1 S2  Breasts: under both breasts there is red scaly rash c/w fungal rash  MS: right lower rib cage with mild TTP  GI: normal bowel sounds, soft, NT/ND  EXTREMITIES: no cyanosis, clubbing or edema  NEURO: Alert and oriented  Bilateral barefoot skin diabetic exam is normal, visualized feet and the appearance is normal.  Bilateral monofilament/sensation of both feet is normal.  Pulsation pedal pulse exam of both lower legs/feet is normal as well.       ASSESSMENT AND PLAN:     Encounter Diagnoses   Name     New onset type 2 diabetes mellitus (HCC)- hgba1c of 7.1, due to baseline GI issues, will avoid metformin. Start glipizide ER 2.5mg daily, referred for  diabetic education and eye exam. Foot exam done today     Fungal rash of torso- lotrisone as directed     Right-sided chest wall pain- likely contusion of chest wall, she can use otc tylenol prn.         Orders Placed This Encounter   Procedures    Hemoglobin A1C [E]    Microalb/Creat Ratio, Random Urine    Lipid Panel [E]    Comp Metabolic Panel (14)       Meds & Refills for this Visit:  Requested Prescriptions     Signed Prescriptions Disp Refills    clotrimazole-betamethasone 1-0.05 % External Cream 60 g 2     Sig: Apply to affected area BID for 2 weeks    glipiZIDE ER 2.5 MG Oral Tablet 24 Hr 30 tablet 1     Sig: Take 1 tablet (2.5 mg total) by mouth daily with breakfast.       Imaging & Consults:  OP REFERRAL DIABETES FULL ED 10 HRS GROUP/IND  OPHTHALMOLOGY - INTERNAL    Return in about 3 months (around 6/5/2024), or if symptoms worsen or fail to improve, for follow up chronic issues.  There are no Patient Instructions on file for this visit.      The patient indicates understanding of these issues and agrees to the plan.

## 2024-03-06 PROBLEM — E11.9 NEW ONSET TYPE 2 DIABETES MELLITUS (HCC): Status: ACTIVE | Noted: 2024-03-06

## 2024-03-06 PROBLEM — R07.89 RIGHT-SIDED CHEST WALL PAIN: Status: ACTIVE | Noted: 2024-03-06

## 2024-03-06 PROBLEM — B36.9 FUNGAL RASH OF TORSO: Status: ACTIVE | Noted: 2024-03-06

## 2024-03-07 ENCOUNTER — TELEPHONE (OUTPATIENT)
Dept: INTERNAL MEDICINE CLINIC | Facility: CLINIC | Age: 74
End: 2024-03-07

## 2024-03-07 DIAGNOSIS — E11.9 NEW ONSET TYPE 2 DIABETES MELLITUS (HCC): Primary | ICD-10-CM

## 2024-03-07 RX ORDER — LANCETS 33 GAUGE
1 EACH MISCELLANEOUS DAILY
Qty: 100 EACH | Refills: 1 | Status: SHIPPED | OUTPATIENT
Start: 2024-03-07 | End: 2025-03-07

## 2024-03-07 RX ORDER — BLOOD SUGAR DIAGNOSTIC
STRIP MISCELLANEOUS
Qty: 100 STRIP | Refills: 1 | Status: SHIPPED | OUTPATIENT
Start: 2024-03-07 | End: 2025-03-07

## 2024-03-07 RX ORDER — BLOOD-GLUCOSE METER
1 KIT MISCELLANEOUS DAILY
Qty: 1 KIT | Refills: 0 | COMMUNITY
Start: 2024-03-07 | End: 2025-03-07

## 2024-03-08 ENCOUNTER — TELEPHONE (OUTPATIENT)
Dept: INTERNAL MEDICINE CLINIC | Facility: CLINIC | Age: 74
End: 2024-03-08

## 2024-03-08 RX ORDER — LANCETS
1 EACH MISCELLANEOUS DAILY
Qty: 100 EACH | Refills: 1 | Status: SHIPPED | OUTPATIENT
Start: 2024-03-08 | End: 2025-03-08

## 2024-03-08 RX ORDER — BLOOD-GLUCOSE METER
1 EACH MISCELLANEOUS DAILY
Qty: 1 KIT | Refills: 0 | Status: SHIPPED | OUTPATIENT
Start: 2024-03-08 | End: 2025-03-08

## 2024-03-08 RX ORDER — BLOOD SUGAR DIAGNOSTIC
STRIP MISCELLANEOUS
Qty: 100 STRIP | Refills: 1 | Status: SHIPPED | OUTPATIENT
Start: 2024-03-08 | End: 2025-03-08

## 2024-03-08 NOTE — TELEPHONE ENCOUNTER
Received fax regarding pt's test strips. PA required for OneTouch Verio Strips, but not required for Accu-Chek Avia Plus. Pended Accu-Chek kit because it looks like OneTouch kit was sent.     TB, would you send Accu-Chek or do PA on OneTouch?

## 2024-03-10 ENCOUNTER — PATIENT MESSAGE (OUTPATIENT)
Dept: SURGERY | Facility: CLINIC | Age: 74
End: 2024-03-10

## 2024-03-28 ENCOUNTER — NURSE ONLY (OUTPATIENT)
Dept: ENDOCRINOLOGY CLINIC | Facility: CLINIC | Age: 74
End: 2024-03-28
Payer: COMMERCIAL

## 2024-03-28 DIAGNOSIS — E11.9 NEW ONSET TYPE 2 DIABETES MELLITUS (HCC): Primary | ICD-10-CM

## 2024-03-29 ENCOUNTER — NURSE ONLY (OUTPATIENT)
Dept: INTERNAL MEDICINE CLINIC | Facility: CLINIC | Age: 74
End: 2024-03-29
Payer: COMMERCIAL

## 2024-03-29 DIAGNOSIS — E53.8 B12 DEFICIENCY: Primary | ICD-10-CM

## 2024-03-29 PROCEDURE — 96372 THER/PROPH/DIAG INJ SC/IM: CPT | Performed by: INTERNAL MEDICINE

## 2024-03-29 RX ORDER — CYANOCOBALAMIN 1000 UG/ML
1000 INJECTION, SOLUTION INTRAMUSCULAR; SUBCUTANEOUS ONCE
Status: COMPLETED | OUTPATIENT
Start: 2024-03-29 | End: 2024-03-29

## 2024-03-29 RX ADMIN — CYANOCOBALAMIN 1000 MCG: 1000 INJECTION, SOLUTION INTRAMUSCULAR; SUBCUTANEOUS at 08:36:00

## 2024-04-01 RX ORDER — LANCETS
1 EACH MISCELLANEOUS 2 TIMES DAILY
COMMUNITY
Start: 2024-04-01

## 2024-04-01 RX ORDER — BLOOD SUGAR DIAGNOSTIC
STRIP MISCELLANEOUS
COMMUNITY
Start: 2024-04-01

## 2024-04-01 NOTE — PROGRESS NOTES
Mae Mark  : 1950 attended Step 1 Diabetic Education:    Date: 3/28/2024  Referring Provider: Dr. OMARI Valentino  Start time: 12:30pm End time: 1:30pm    LMP  (LMP Unknown)     HEMOGLOBIN A1c (% of total Hgb)   Date Value   2020 5.8 (H)     HgbA1C (%)   Date Value   2024 7.1 (H)        Diabetes Overview, pathophysiology, A1C results and treatment options for diabetes self-management:   Described basic process and treatment options for diabetes self-management.  Comment: Pt. Has been taking steroids for Crohn's;had been prediabetic    Healthy Eating: H/O Crohn's disease limits choices  Obtained usual diet history: Eats 3 meals/day B: 7am cereal,Niru-o Royal ascencion's w/keto bread & unsalted butter mini waffles w/eggs ,2 slices no sodium martinez SF jam  L12-1pm  chx breast w/salad, turkey ascencion D: 6-7pm steaks 3-4/yr. chicken , turkey, salads w/croutons, [place] national hot dogs. No raw veggies.Drinks 1/2 -1 SF sprite daily, water . Rarely eats out ;if she does, it's naked wings  Instructed on balanced plate method including what is carbohydrate, limit starch to 1/4 of plate, protein 1/4 of plate, non-starchy vegetables 1/2 of plate and modest portions of fruit, yogurt, milk if desired.    Being Active: Encouraged Physical Activity  No regular activity    Taking Medications: Reviewed current diabetes medications Comment:Pt. has h/o of Crohn's disease- no Metformin  Glipizide ER 2.5 mg daily    Monitoring: Instructed/reinforced blood glucose monitoring, testing schedules and target goals:   Fasting / Pre-meal  2 Hour Post-prandial 140-180  Demonstrated ability to perform blood glucose testing on: Accu-chek Guide meter  Didn't bring meter to appt.    Problem Solving: Prevention,detection and treatment of acute complications: taught symptoms of hypoglycemia, hyperglycemia, how to treat low blood sugar (Rule of 15) and actions for lowering high blood glucose levels.    Recommendations:  Start  monitoring blood glucose and attend Step 2 Class.    Written materials provided for all areas covered.    Patient verbalized understanding and has no further questions at this time.    Karyn Cho RN, [facility]

## 2024-04-16 ENCOUNTER — NURSE ONLY (OUTPATIENT)
Dept: ENDOCRINOLOGY CLINIC | Facility: CLINIC | Age: 74
End: 2024-04-16
Payer: COMMERCIAL

## 2024-04-16 DIAGNOSIS — E11.9 NEW ONSET TYPE 2 DIABETES MELLITUS (HCC): Primary | ICD-10-CM

## 2024-04-16 PROCEDURE — G0108 DIAB MANAGE TRN  PER INDIV: HCPCS | Performed by: DIETITIAN, REGISTERED

## 2024-04-16 RX ORDER — LANCETS
1 EACH MISCELLANEOUS 2 TIMES DAILY
Qty: 204 EACH | Refills: 3 | Status: SHIPPED | OUTPATIENT
Start: 2024-04-16 | End: 2025-04-16

## 2024-04-17 NOTE — PROGRESS NOTES
Mae Mark : 1950 was seen for Diabetic Education Follow up:    Date: 24  Referring Provider: Dr. OMARI Valentino  Start time: 2pm End time: 2:30pm    Assessment:     Diagnosis: New Onset Type 2    Reason for visit: Having trouble getting enough blood for fingersticks; Softclix lancet device broken    Changes since last visit:  - Meals: focusing on eating protein & carb with meals  - Medications:Glipizide ER 2.5 mg before breakfast  - Exercise:has started walking at the mall    SMBG: BG readings running  mg/dl after breakfast     MONITORING:   - Type of meter:Accu-chek Guide  - Demonstration/Return Demo:  B mg/dL,  hr.p.p. chx sausage, 2 fried eggs w/1 biscuit & 1 tbsp jam  - Sent Fastclix lancets to pharmacy for pt. to use; also helps with shaky hands    Patient verbalized understanding and has no further questions at this time.    Karyn Cho RN, [facility]

## 2024-04-21 VITALS — WEIGHT: 126.19 LBS | BODY MASS INDEX: 24 KG/M2

## 2024-04-29 ENCOUNTER — TELEPHONE (OUTPATIENT)
Dept: INTERNAL MEDICINE CLINIC | Facility: CLINIC | Age: 74
End: 2024-04-29

## 2024-04-29 NOTE — TELEPHONE ENCOUNTER
Patient states her ankles and feet have been swollen for over a month, easily bruising, some areas seem to be healing on legs, no pain but her legs are leaking a clear fluid.  Pt states the swelling goes down overnight but comes back during the day even with elevating legs during  the day.  Pt is scheduled with TB for 4/30/24 at 8:40am.  Pt verbalizes understanding.

## 2024-04-29 NOTE — TELEPHONE ENCOUNTER
Patient stated she have swollen in the her feet and ankle for one month and leaking in the skin.   Patient is requesting to see only to see Dr. Mahi Valentino.    Appt is schedule with   Future Appointments   Date Time Provider Department Center   4/30/2024  8:40 AM Mahi Valentino MD [id number]

## 2024-04-29 NOTE — TELEPHONE ENCOUNTER
Patient called back. Sent message to triage, but all nurses were with patients. Explained to patient to keep her phone on and that a nurse will reach back out. Patient understanding and appreciative.

## 2024-04-30 ENCOUNTER — OFFICE VISIT (OUTPATIENT)
Dept: INTERNAL MEDICINE CLINIC | Facility: CLINIC | Age: 74
End: 2024-04-30
Payer: COMMERCIAL

## 2024-04-30 ENCOUNTER — LAB ENCOUNTER (OUTPATIENT)
Dept: LAB | Age: 74
End: 2024-04-30
Attending: INTERNAL MEDICINE
Payer: COMMERCIAL

## 2024-04-30 VITALS
DIASTOLIC BLOOD PRESSURE: 62 MMHG | SYSTOLIC BLOOD PRESSURE: 126 MMHG | BODY MASS INDEX: 24.77 KG/M2 | HEART RATE: 63 BPM | OXYGEN SATURATION: 97 % | WEIGHT: 131.19 LBS | HEIGHT: 61 IN | TEMPERATURE: 97 F | RESPIRATION RATE: 18 BRPM

## 2024-04-30 DIAGNOSIS — R60.0 BILATERAL LEG EDEMA: Primary | ICD-10-CM

## 2024-04-30 DIAGNOSIS — R63.5 WEIGHT GAIN: ICD-10-CM

## 2024-04-30 DIAGNOSIS — R06.09 DOE (DYSPNEA ON EXERTION): ICD-10-CM

## 2024-04-30 DIAGNOSIS — R60.0 BILATERAL LEG EDEMA: ICD-10-CM

## 2024-04-30 DIAGNOSIS — E53.8 B12 DEFICIENCY: ICD-10-CM

## 2024-04-30 DIAGNOSIS — E11.9 NEW ONSET TYPE 2 DIABETES MELLITUS (HCC): ICD-10-CM

## 2024-04-30 LAB
ANION GAP SERPL CALC-SCNC: 4 MMOL/L (ref 0–18)
BUN BLD-MCNC: 34 MG/DL (ref 9–23)
CALCIUM BLD-MCNC: 9.4 MG/DL (ref 8.5–10.1)
CHLORIDE SERPL-SCNC: 110 MMOL/L (ref 98–112)
CO2 SERPL-SCNC: 27 MMOL/L (ref 21–32)
CREAT BLD-MCNC: 0.96 MG/DL
EGFRCR SERPLBLD CKD-EPI 2021: 63 ML/MIN/1.73M2 (ref 60–?)
FASTING STATUS PATIENT QL REPORTED: NO
GLUCOSE BLD-MCNC: 94 MG/DL (ref 70–99)
MAGNESIUM SERPL-MCNC: 1.9 MG/DL (ref 1.6–2.6)
OSMOLALITY SERPL CALC.SUM OF ELEC: 299 MOSM/KG (ref 275–295)
POTASSIUM SERPL-SCNC: 4.7 MMOL/L (ref 3.5–5.1)
SODIUM SERPL-SCNC: 141 MMOL/L (ref 136–145)

## 2024-04-30 PROCEDURE — 3008F BODY MASS INDEX DOCD: CPT | Performed by: INTERNAL MEDICINE

## 2024-04-30 PROCEDURE — 96372 THER/PROPH/DIAG INJ SC/IM: CPT | Performed by: INTERNAL MEDICINE

## 2024-04-30 PROCEDURE — 80048 BASIC METABOLIC PNL TOTAL CA: CPT | Performed by: INTERNAL MEDICINE

## 2024-04-30 PROCEDURE — 99214 OFFICE O/P EST MOD 30 MIN: CPT | Performed by: INTERNAL MEDICINE

## 2024-04-30 PROCEDURE — 3072F LOW RISK FOR RETINOPATHY: CPT | Performed by: INTERNAL MEDICINE

## 2024-04-30 PROCEDURE — 3074F SYST BP LT 130 MM HG: CPT | Performed by: INTERNAL MEDICINE

## 2024-04-30 PROCEDURE — 3078F DIAST BP <80 MM HG: CPT | Performed by: INTERNAL MEDICINE

## 2024-04-30 PROCEDURE — 83735 ASSAY OF MAGNESIUM: CPT | Performed by: INTERNAL MEDICINE

## 2024-04-30 RX ORDER — HYDROCHLOROTHIAZIDE 12.5 MG/1
12.5 CAPSULE, GELATIN COATED ORAL DAILY
Qty: 30 CAPSULE | Refills: 0 | Status: SHIPPED | OUTPATIENT
Start: 2024-04-30

## 2024-04-30 RX ORDER — CYANOCOBALAMIN 1000 UG/ML
1000 INJECTION, SOLUTION INTRAMUSCULAR; SUBCUTANEOUS ONCE
Status: COMPLETED | OUTPATIENT
Start: 2024-04-30 | End: 2024-04-30

## 2024-04-30 RX ADMIN — CYANOCOBALAMIN 1000 MCG: 1000 INJECTION, SOLUTION INTRAMUSCULAR; SUBCUTANEOUS at 09:40:00

## 2024-04-30 NOTE — PROGRESS NOTES
Mae Mark is a 73 year old female.    Chief Complaint   Patient presents with    Edema     EJ RM 3- Pt is here for bilateral swollen ankles and feet    Diabetes    Injection     B12 inj       HPI:     Patient with DM2, Crohn's, asthma-COPD, CKD here for follow up-  C/o weight gain over the last few months, 10+ pounds over her usual. She notes legs from knee down are significantly swollen and even leak fluid sometimes. Today is a better day so not as swollen but overall she feels heavy and uncomfortable. She is weaning down from high dose prednisone that was started in Dec for Crohn's(started at 40mg and now down to 10mg). She has chronic MOORE, denies acute SOB/CP. She has been checking her BG and values are WNL. She is taking glipizide ER 2.5mg daily. No hypoglycemia. Discussed she may be able to stop the glipizide once completely done with prednisone. She has b12 deficiency and brain fog, taking b12 injections monthly. Her last b12 level was 293 in January.  Discussed we can repeat a level in June and have her try oral b12 supp if level ok  Crohn's symptoms are under control per patient, no n/v/diarrhea. She is up to date on eye exam (Dr. Salinas 4/24/24)      Patient Active Problem List   Diagnosis    Primary localized osteoarthrosis, hand    Arthritis    Crohn's disease (HCC)    Retroperitoneal lymphadenopathy    Elevated uric acid in blood    Asthma-COPD overlap syndrome (HCC)    Tinnitus of both ears    Dry eyes    Degenerative disc disease, lumbar    Swollen lymph nodes    Acute kidney injury (HCC)    Azotemia    Hyperglycemia    Abdominal pain of unknown etiology    Hyponatremia    History of acute renal failure    Fungal infection of toenail    Odynophagia    Regional enteritis of small intestine with large intestine (HCC)    Elevated glucose    Renal insufficiency    New onset type 2 diabetes mellitus (HCC)    Fungal rash of torso    Right-sided chest wall pain     Current Outpatient Medications    Medication Sig Dispense Refill    hydroCHLOROthiazide 12.5 MG Oral Cap Take 1 capsule (12.5 mg total) by mouth daily. 30 capsule 0    Accu-Chek FastClix Lancets Does not apply Misc 1 Lancet by Finger stick route 2 (two) times daily. Use as directed. 204 each 3    Glucose Blood (ACCU-CHEK GUIDE) In Vitro Strip Check blood sugar 2 times daily      Accu-Chek FastClix Lancets Does not apply Misc 1 Lancet by Finger stick route 2 (two) times daily.      ketoconazole 2 % External Cream Apply 1 Application topically 2 (two) times daily. APPLY TO AFFECTED AREA      potassium citrate-citric acid 1100-334 MG/5ML Oral Solution Take 15 mL (30 mEq total) by mouth daily with breakfast. 473 mL 11    clotrimazole-betamethasone 1-0.05 % External Cream Apply to affected area BID for 2 weeks 60 g 2    glipiZIDE ER 2.5 MG Oral Tablet 24 Hr Take 1 tablet (2.5 mg total) by mouth daily with breakfast. 30 tablet 1    PREDNISONE 5 MG Oral Tab TAKE EIGHT TABLETS BY MOUTH EVERY DAY with breakfast 240 tablet 1    HYDROcodone-acetaminophen (NORCO) 5-325 MG Oral Tab Take 1 tablet by mouth every 6 (six) hours as needed for Pain. 30 tablet 0    ondansetron (ZOFRAN) 4 mg tablet Take 1 tablet (4 mg total) by mouth every 8 (eight) hours as needed for Nausea. 60 tablet 0    metRONIDAZOLE 0.75 % External Cream apply TO AFFECTED AREA ON face EVERY DAY BEFORE NOON      pimecrolimus 1 % External Cream apply topically TWICE DAILY TO affected AREAS      Sulfacetamide Sodium-Sulfur 10-5 % External Liquid Apply 1 Application topically 2 (two) times daily.      triamcinolone 0.1 % External Cream APPLY TO THE AFFECTED AREA(S) ON BACK TWICE DAILY FOR UP TO TWO WEEKS. AVOID face, armpits AND groin      albuterol (VENTOLIN HFA) 108 (90 Base) MCG/ACT Inhalation Aero Soln Inhale 2 puffs into the lungs every 6 (six) hours as needed for Wheezing. 1 each 3    Tiotropium Bromide Monohydrate (SPIRIVA RESPIMAT) 2.5 MCG/ACT Inhalation Aero Soln Inhale 2 puffs into the  lungs daily. 1 each 3    acetaminophen 500 MG Oral Tab Take 1 tablet (500 mg total) by mouth every 6 (six) hours as needed for Pain.      Omeprazole 40 MG Oral Capsule Delayed Release Take 1 capsule (40 mg total) by mouth daily. 90 capsule 3      Past Medical History:    Abdominal distention    Abdominal hernia    2021    Abdominal pain    years ago before ostomy    Anemia    occasional in the past    Anxiety    Arthritis    Asthma (HCC)    Atypical mole    Back pain    Bad breath    mouth hot/dry    Belching    Some liquids have begun causing belching. Sodas, sometimes water    Black stools    years ago    Bleeding nose    Bloating    Blood in the stool    years ago    Blood in urine    Blurred vision    Body piercing    Calculus of kidney    Have just been told I have kidney stones by [facility] Doctor    Change in hair    hair drier than usual    Chest pain on exertion    pressure    Chronic cough    Crohn's disease (HCC)    Diabetes mellitus (HCC)    Diarrhea, unspecified    Dizziness    still not steady    Easy bruising    Fatigue    Fever    Flatulence/gas pain/belching    No more than normal    Food intolerance    Frequent urination    Headache disorder    from deyhdration?    Hearing impairment    hearing aids    Hearing loss    Dr. Perea    Heartburn    Hemorrhoids    High cholesterol    Indigestion    have no gallbladder    Irregular bowel habits    chron's    Itch of skin    all over/not severe but bothersome when skin very dry. Scratch & is inflammed    Kidney failure    mild    Leg swelling    ankles-once in a while    Loss of appetite    Mouth sores    fever blisters inside lips/tongue    Nausea    since January 2018-occasional    Night sweats    Osteoarthritis    hands, knees    Pain in joints    Painful swallowing    not painful but uncomfortable    Painful urination    Prediabetes    Problems with swallowing    Began taking Terbinafine March 22, 2023 for a toenail fungal infection. Began  noticing irritated throat/top of gut were burning and swallowing becoming uncomfortable. Also experiencing weakness, dehydration, nausea, taste buds gone, loss of appetite    Rash    Shortness of breath    smoker    Skin blushing/flushing    years ago with active crohn's/before ileostomy    Sleep apnea    trouble sleeping since 2023    Sleep disturbance    Sputum production    Stool incontinence    years ago    Stress    typical on occasion    Uncomfortable fullness after meals    Visual impairment    glasses    Vomiting    Wears glasses    Weight gain    Weight loss    dieted in 2022. lost about 8 lbs. Regained about 4.  taste buds destroyed (temporarily?) due to reaction to Terbinafine. Lost about 20 lbs. between late April & .    Wheezing      Social History:  Social History     Socioeconomic History    Marital status:    Tobacco Use    Smoking status: Every Day     Current packs/day: 0.00     Average packs/day: 0.5 packs/day for 50.0 years (25.0 ttl pk-yrs)     Types: Cigarettes     Start date: 1973     Last attempt to quit: 2023     Years since quittin.8    Smokeless tobacco: Never    Tobacco comments:     tried Chantix three times-after about 2 months I became depressed and irritable.   Vaping Use    Vaping status: Never Used   Substance and Sexual Activity    Alcohol use: Not Currently     Comment: very rarely consume alcohol    Drug use: Not Currently     Types: Cannabis    Sexual activity: Not Currently   Social History Narrative    , no children.  Retired, previously worked in [employer].     Family History   Problem Relation Age of Onset    Diabetes Father         father    Cancer Father         lung, +smoker    Crohn's Disease Sister     Crohn's Disease Sister         Crohn's - her son also has Crohn's now, also my great niece has Crohn's    Diabetes Brother     Crohn's Disease Brother             Cancer Brother         lung  cancer, +smoker    Breast Cancer Maternal Grandmother 80            Crohn's Disease Nephew     Crohn's Disease Other         Allergies  Allergies   Allergen Reactions    Mold Spores ASTHMA, ITCHING, Runny nose and WHEEZING         REVIEW OF SYSTEMS:   GENERAL HEALTH:  no fevers ++weight gain  RESPIRATORY: no cough  CARDIOVASCULAR: denies chest pain  GI: denies abdominal pain  : no dysuria  NEURO: denies headaches  PSYCH: stress about medical issues  HEME: No adenopathy      EXAM:   /62   Pulse 63   Temp 97.1 °F (36.2 °C) (Temporal)   Resp 18   Ht 5' 1\" (1.549 m)   Wt 131 lb 3.2 oz (59.5 kg)   LMP  (LMP Unknown)   SpO2 97%   BMI 24.79 kg/m²   GENERAL: well developed, well nourished,in no apparent distress  HEENT: atraumatic, normocephalic  NECK: supple,no adenopathy  LUNGS: normal rate without respiratory distress, lungs clear to auscultation  CARDIO: RRR nl S1 S2  GI: normal bowel sounds, soft, NT/ND  EXTREMITIES: 1+ edema from knees down, no calf pain. Pulses intact  NEURO: Alert and oriented    ASSESSMENT AND PLAN:     Encounter Diagnoses   Name           Bilateral leg edema- new bilateral LE edema, discussed DDX with pt including CHF, renal dysfunction, prednisone side effect. Will check echo and BMP, she is down to 10mg of prednisone this week. Start hydrochlorothiazide 12.5mg daily as long as BMP ok today     MOORE (dyspnea on exertion)- chronic, she does have asthma-COPD. Will check echo due to MOORE, weight gain and LE edema to rule out CHF     Weight gain- likely related to prednisone for 4 months, she is weaning off over the next 2 weeks     New onset type 2 diabetes mellitus (HCC)- BG well controlled, continue low dose glipizide er 2.5mg along with dm diet. Once off the steroids, can likely stop the glipizide       B12 deficiency - b12 injection today, check level in         Orders Placed This Encounter   Procedures    Magnesium [E]    Basic Metabolic Panel (8) [E]       Meds &  Refills for this Visit:  Requested Prescriptions     Signed Prescriptions Disp Refills    hydroCHLOROthiazide 12.5 MG Oral Cap 30 capsule 0     Sig: Take 1 capsule (12.5 mg total) by mouth daily.       Imaging & Consults:  CARD ECHO 2D DOPPLER (CPT=93306)    Return in about 8 weeks (around 6/26/2024) for follow up for swelling, weight, diabetes, etc.  There are no Patient Instructions on file for this visit.      The patient indicates understanding of these issues and agrees to the plan.

## 2024-05-05 DIAGNOSIS — E11.9 NEW ONSET TYPE 2 DIABETES MELLITUS (HCC): ICD-10-CM

## 2024-05-07 RX ORDER — GLIPIZIDE 2.5 MG/1
2.5 TABLET, EXTENDED RELEASE ORAL
Qty: 90 TABLET | Refills: 1 | Status: SHIPPED | OUTPATIENT
Start: 2024-05-07 | End: 2024-08-08 | Stop reason: ALTCHOICE

## 2024-05-07 NOTE — TELEPHONE ENCOUNTER
Please review; protocol failed/ has no protocol    Requested Prescriptions   Pending Prescriptions Disp Refills    GLIPIZIDE ER 2.5 MG Oral Tablet 24 Hr [Pharmacy Med Name: glipizide ER 2.5 mg tablet, extended release 24 hr] 30 tablet 1     Sig: TAKE ONE TABLET BY MOUTH EVERY DAY with breakfast       Diabetes Medication Protocol Failed - 5/5/2024  9:10 AM        Failed - Microalbumin procedure in past 12 months or taking ACE/ARB        Passed - Last A1C < 7.5 and within past 6 months     Lab Results   Component Value Date    A1C 7.1 (H) 02/28/2024             Passed - In person appointment or virtual visit in the past 6 mos or appointment in next 3 mos     Recent Outpatient Visits              1 week ago Bilateral leg edema    [address] Mahi Valentino MD    Office Visit    2 weeks ago Crohn's disease of small intestine without complication (HCC)    [facility]    Nurse Only    3 weeks ago New onset type 2 diabetes mellitus (HCC)    [address] Karyn Cho RN, [facility]    Nurse Only    1 month ago B12 deficiency    [address]    Nurse Only    1 month ago Crohn's disease of small intestine without complication ([facility])    [facility] Nimo Guadalupe APRN    Office Visit          Future Appointments         Provider Department Appt Notes    Tomorrow Lisa Navarro, THU [address]     In 1 week EMG DIAB [address]     In 1 week  CARD ECHO  [facility] qa gt cob,con.    In 3 weeks EMG DIAB [address]     In 3 weeks Nimo Guadalupe APRN [facility] 3/1/24 - per ov, pt sched 3mo fuov w/Nimo on 6/3/24 9AM - [facility]    In 1 month EMG DIAB  CLASSROOM [address]     In 1 month Mahi Valentino MD [address] FUP-Fluid issues, DM, chronic issures OK per TB    In 1 month INFUSION ECC NURSE [facility],  [facility] B&B ( 3)  Inflectra - dose #4,   300 mg, 5 mg/kg Q8 weeks for CD. JW    In 3 months EH CT MAIN [address] CT Current Status of kidney health    In 3 months Joao Marie MD [facility] 6 month follow up CT Prior                    Passed - EGFRCR or GFRNAA > 50     GFR Evaluation  EGFRCR: 63 , resulted on 4/30/2024          Passed - GFR in the past 12 months           Recent Outpatient Visits              1 week ago Bilateral leg edema    [address] Mahi Valentino MD    Office Visit    2 weeks ago Crohn's disease of small intestine without complication (HCC)    [facility],  [facility]    Nurse Only    3 weeks ago New onset type 2 diabetes mellitus (HCC)    [address] Karyn Cho, RN, [facility]    Nurse Only    1 month ago B12 deficiency    [address]    Nurse Only    1 month ago Crohn's disease of small intestine without complication (HCC)    [facility],  [facility] Nimo Guadalupe APRN    Office Visit          Future Appointments         Provider Department Appt Notes    Tomorrow Lisa Navarro, THU [address]     In 1 week EMG DIAB [address]     In 1 week EH CARD ECHO [address] [facility] qa gt cob,con.    In 3 weeks EMG DIAB [address]     In 3 weeks Nimo Guadalupe APRN [facility],  [facility] 3/1/24 - per ov, pt sched 3mo fuov w/Nimo on 6/3/24 9AM -  [facility]    In 1 month EMG DIAB CLASSROOM [facility], [address] [place] [address]     In 1 month Mahi Valentino MD [facility], [address] [place] [address] FUP-Fluid issues, DM, chronic issures OK per TB    In 1 month INFUSION ECC NURSE [facility],  [facility] B&[facility] ( 3)  Inflectra - dose #4,   300 mg, 5 mg/kg Q8 weeks for CD. JW    In 3 months EH CT MAIN [address] CT Current Status of kidney health    In 3 months Joao Marie MD [facility], [facility] 6 month follow up CT Prior

## 2024-05-08 ENCOUNTER — DIABETIC EDUCATION (OUTPATIENT)
Dept: ENDOCRINOLOGY CLINIC | Facility: CLINIC | Age: 74
End: 2024-05-08
Payer: COMMERCIAL

## 2024-05-08 VITALS — WEIGHT: 124.19 LBS | BODY MASS INDEX: 23 KG/M2

## 2024-05-08 DIAGNOSIS — E11.9 NEW ONSET TYPE 2 DIABETES MELLITUS (HCC): Primary | ICD-10-CM

## 2024-05-08 PROCEDURE — 97802 MEDICAL NUTRITION INDIV IN: CPT | Performed by: DIETITIAN, REGISTERED

## 2024-05-08 NOTE — PROGRESS NOTES
Mae Mark 1950 was seen for individual Diabetic Medical Nutrition Therapy- an initial consult:    Date: 2024   Referral: Mahi Valentino MD  Start time 9:30 am  End time: 10:30 am    73 year old female presents for medical nutrition therapy for new onset diabetes. Notes she has been weaning down on prednisone.       Anthropometrics:  Wt Readings from Last 6 Encounters:   24 124 lb 3.2 oz   24 131 lb 3.2 oz   24 129 lb   24 126 lb 3.2 oz   24 123 lb 9.6 oz   24 122 lb       Current diabetes medications:  Oral:  Glipizide ER 2.5 mg daily with breakfast    Discussed potential side effect of low blood sugar when taking glipizide.    Labs:  Lab Results   Component Value Date    A1C 7.1 (H) 2024    A1C 5.8 (H) 2020    CHOLEST 155 2023    CHOLEST 181 2022    LDL 82 2023     (H) 2022    HDL 50 2023    HDL 52 2022    NONHDLC 105 2023    NONHDLC 129 2022    TRIG 130 2023    TRIG 145 2022    BUN 34 (H) 2024    BUN 34 (H) 2024    CREATSERUM 0.96 2024    CREATSERUM 0.97 2024    GFRNAA 43 (L) 06/15/2022    GFRNAA 52 (L) 2021    GFRAA 49 (L) 06/15/2022    GFRAA 59 (L) 2021       Lab Results   Component Value Date    A1C 7.1 (H) 2024    A1C 5.8 (H) 2020    A1C 5.9 (H) 2019         Glucose testing at home:     Currently checking BG: Yes,  2 times/day, fasting and after meals    BG results: per patient report   FB's, 75-86 mg/dl   Notes had reading of 156 about 2 weeks ago - had eaten differently the day before        After Meals: high 80's, 102 mg/dl    Discussed goal blood sugar ranges per ADA guidelines.    Discussed typical symptoms of a low blood sugar, protocol to treat, appropriate sources of quick acting carbs (provided handout on Hypoglycemia).    Recommended continuing to monitor blood sugars and to contact PCP if blood sugars  frequently in the 70's - may need adjustment to diabetes medication        Diet History:  Usually eats 4 smaller meals/day. Notes over the last week her appetite has dropped.  AM/Breakfast - Did not eat today. Usually has turkey ascencion w/homemade sourdough bread and may include low sugar jam  Mid-day eats about 4 hrs after first meal. Leftover fajitas w/medium size flour tortilla shells  Snacks can include cookies, sunchips, apple with peanut butter      Physical Activity: Walking  Walks for 30 minutes daily    Nutrition Diagnosis  PES: Altered nutrition related laboratory values related to knowledge deficit as evidenced by elevated HBA1c      Intervention  -Comprehensive Nutrition Education Provided:  Reviewed carbohydrate counting and label reading.  Recommended carbohydrate targets of 30-45 grams at meals and 15 grams at snacks.  Provided suggestions for carbohydrate controlled meals and snacks.  Reviewed the MyPlate method with focus on balanced macronutrient consumption; identifying foods that are carbohydrates, lean protein, non-starchy vegetables, and heart healthy fats.      -Education on Increased Physical Activity:  discussed how increased physical activity improves insulin resistance, blood glucose control, and heart health      Monitoring/Evaluation  Diet modification/understanding  Blood sugars  Hemoglobin A1c      Recommendations  Practice healthful eating patterns, emphasizing a variety of nutrient dense foods in appropriate portion sizes.    Monitor carbohydrate intake with the MyPlate method   Practice carbohydrate counting and label reading  Continue checking blood sugars twice/day. Contact PCP if blood sugars are frequently in the 70's - may need adjustment to diabetes medication.    Patient Specific Goals:  Aim to follow a meal plan with a consistent amount of carbohydrates for meals and snacks:  Goal for meals is 30-45 grams of carbohydrates for each meal (3 meals per day)  Goal for snacks is 15  grams of carbohydrates for each snack (1-2 per day)    Aim to include carbohydrates plus protein with meals and snacks    You can also use the Plate Method as a model for your meals:  1/2 of your plate is non-starchy vegetables, 1/4 of your plate is lean protein, 1/4 of your plate is starchy or carbohydrate foods    Blood Glucose Goals  Blood sugar goals: Between  mg/dl in the morning (fasting) and less than 180 mg/dl 2 hours after meals.  Keep glucose tabs or fast acting carbohydrates with you for treatment of lows; for blood glucose levels less than 70 mg/dl, take 15 grams of fast acting carbohydrates (3-4 glucose tabs, 4 ounces of juice, or as discussed). Retest in 15 min. If not above 70 mg/dl, retreat.    Always test your blood sugar if you feel any symptoms of a low blood sugar.      If you have a low blood sugar (less than 70 mg/dL) follow the \"Rule of 15\" to treat it:  1.) Take 15 grams of a quick acting carbohydrate (3-4 glucose tablets, 1/2 cup fruit juice, 1/2 can regular soda, or 5-6 hard candies).    2.) Then test your blood sugar again after 15 minutes of drinking or eating the quick acting carbohydrate to be sure your blood sugar is above 70 mg/dL.      Continue to monitor your blood sugars. Contact your doctor if your blood sugars are frequently in the 70's - you may need an adjustment to your diabetes medication.      Follow up through attending Diabetes Class Series.        Lisa Navarro, THUN, NISSAN, CDCES

## 2024-05-08 NOTE — PATIENT INSTRUCTIONS
Goals to work towards:    Aim to follow a meal plan with a consistent amount of carbohydrates for meals and snacks:  Goal for meals is 30-45 grams of carbohydrates for each meal (3 meals per day)  Goal for snacks is 15 grams of carbohydrates for each snack (1-2 per day)    Aim to include carbohydrates plus protein with meals and snacks    You can also use the Plate Method as a model for your meals:  1/2 of your plate is non-starchy vegetables, 1/4 of your plate is lean protein, 1/4 of your plate is starchy or carbohydrate foods    Blood Glucose Goals  Blood sugar goals: Between  mg/dl in the morning (fasting) and less than 180 mg/dl 2 hours after meals.  Keep glucose tabs or fast acting carbohydrates with you for treatment of lows; for blood glucose levels less than 70 mg/dl, take 15 grams of fast acting carbohydrates (3-4 glucose tabs, 4 ounces of juice, or as discussed). Retest in 15 min. If not above 70 mg/dl, retreat.    Always test your blood sugar if you feel any symptoms of a low blood sugar.      If you have a low blood sugar (less than 70 mg/dL) follow the \"Rule of 15\" to treat it:  1.) Take 15 grams of a quick acting carbohydrate (3-4 glucose tablets, 1/2 cup fruit juice, 1/2 can regular soda, or 5-6 hard candies).    2.) Then test your blood sugar again after 15 minutes of drinking or eating the quick acting carbohydrate to be sure your blood sugar is above 70 mg/dL.      Continue to monitor your blood sugars. Contact your doctor if your blood sugars are frequently in the 70's - you may need an adjustment to your diabetes medication.

## 2024-05-14 ENCOUNTER — NURSE ONLY (OUTPATIENT)
Dept: ENDOCRINOLOGY CLINIC | Facility: CLINIC | Age: 74
End: 2024-05-14

## 2024-05-14 DIAGNOSIS — E11.9 NEW ONSET TYPE 2 DIABETES MELLITUS (HCC): Primary | ICD-10-CM

## 2024-05-14 PROCEDURE — G0109 DIAB MANAGE TRN IND/GROUP: HCPCS | Performed by: DIETITIAN, REGISTERED

## 2024-05-15 ENCOUNTER — HOSPITAL ENCOUNTER (OUTPATIENT)
Dept: CV DIAGNOSTICS | Facility: HOSPITAL | Age: 74
Discharge: HOME | End: 2024-05-15
Attending: INTERNAL MEDICINE

## 2024-05-15 DIAGNOSIS — R06.09 DOE (DYSPNEA ON EXERTION): ICD-10-CM

## 2024-05-15 DIAGNOSIS — R60.0 BILATERAL LEG EDEMA: ICD-10-CM

## 2024-05-15 PROCEDURE — 93306 TTE W/DOPPLER COMPLETE: CPT | Performed by: INTERNAL MEDICINE

## 2024-05-15 NOTE — PROGRESS NOTES
Mae Mark : 1950 attended Step 3 Group Diabetes Education Class: Food Groups, Carbohydrate Counting, Label Reading    2024   Referring Provider: Mahi Valentino MD  Start time: 5:30 pm End time: 7:00 pm    The patient participated during the class: Pt was able to identify sources of carbohydrates, read food labels for carb content of foods.       Healthy Eating:  Reviewed Basic Diet Guidelines as foundation of diabetes meal planning. Reinforced the balanced plate method. Taught nutrition basics defining carbohydrate, protein, and fat. Taught label reading, including an option to count carbohydrate grams or servings. Provided patient with goals for carbohydrate grams/choices for meals and snacks. Discussed sugar substitutes, alcohol and effect on blood glucose. Wade's helpful for smart phones, as well as websites for examining carbohydrate levels provided. Reviewed and reinforced macronutrient's, carbohydrate counting and meal planning.    Problem Solving:  Reinforced signs, symptoms, and treatment of hypoglycemia using the Rule of 15.  Importance of being aware of potential for a low blood sugar when consuming alcohol  Discussed impact of different food items and portion sizes on blood glucose levels.  Discussed blood glucose target of 180 mg/dL, if testing 2 hours post meal.    Monitoring:  Reviewed blood glucose records and importance of tracking foods/blood glucose levels.    Behavior Change:  Reviewed and updated individual goals and action plan set by patient.   Addressed barriers to tracking foods/blood glucose levels and following guidelines presented/achieving goals, and setting SMART goals as a group discussion.    Recommendations:  Follow individual meal plan recommended by Educator (LORENZO).  Continue implementing individual goals.   Attend remaining class sessions.  Begin implementing carbohydrate counting and continue dietary and blood glucose tracking.     Written materials provided  for all areas covered.    Patient verbalized understanding and has no further questions currently.      Lisa Navarro, THUN, NISSAN, [facility]

## 2024-05-23 ENCOUNTER — PATIENT MESSAGE (OUTPATIENT)
Dept: INTERNAL MEDICINE CLINIC | Facility: CLINIC | Age: 74
End: 2024-05-23

## 2024-05-23 NOTE — TELEPHONE ENCOUNTER
From: Mae Mark  To: Mahi Valentino  Sent: 5/23/2024 9:52 AM CDT  Subject: Mae S Deedee [id number]    1. I see Electrocardiogram in my file. Confused. Do I need to schedule?   2. Also, I previously saw Lung X-ray in my \"need to schedule\" but don't find anymore. I would like to schedule but want to make sure it's time.   3. Please update me on when I can schedule next Bloodwork. In June?  Sorry for confusion but have had so much going on for quite a while and your help is greatly appreciated...as is your valuable time.   Kasey Mark

## 2024-05-25 ENCOUNTER — PATIENT MESSAGE (OUTPATIENT)
Dept: INTERNAL MEDICINE CLINIC | Facility: CLINIC | Age: 74
End: 2024-05-25

## 2024-05-25 DIAGNOSIS — R91.8 ABNORMAL CT SCAN OF LUNG: Primary | ICD-10-CM

## 2024-05-28 ENCOUNTER — NURSE ONLY (OUTPATIENT)
Dept: ENDOCRINOLOGY CLINIC | Facility: CLINIC | Age: 74
End: 2024-05-28

## 2024-05-28 DIAGNOSIS — E11.9 NEW ONSET TYPE 2 DIABETES MELLITUS (HCC): Primary | ICD-10-CM

## 2024-05-28 PROCEDURE — G0109 DIAB MANAGE TRN IND/GROUP: HCPCS | Performed by: DIETITIAN, REGISTERED

## 2024-05-29 NOTE — PROGRESS NOTES
Mae Mark  DOB6/22/1950 attended Step 4 Group Class: Reducing Complications, Special Occasion Eating, Treating Hyperglycemia  Pt. verbally consents to a telemedicine service with live, interactive video and audio on 5/28/24. Patient understands and accepts financial responsibility for any deductible, co-insurance and/or co-pays associated with this service.   Date: 5/28/2024  Referring Provider: Dr. OMARI Valentino  Start time: 5;30pm End time: 7:30pm    The patient participated during the class: Patient was able to identify ways to reduce risks for micro and macro-vascular complications of Type 2 Diabetes.      Healthy Eating  Discuss tips for dining out, holidays and special occasion eating    Being Active  Reinforce the importance of regular physical activity    Monitoring  Discuss/answer questions about blood glucose trends    Taking Medication   Reinforce the importance of taking diabetes medications as prescribed    Problem Solving  Discuss disaster preparedness and planning to travel safely with type 2 diabetes    Reducing Risks  Discuss potential complications of uncontrolled diabetes and how to minimize risk including eye, foot, dental, and skin care. Renal and cardiovascular monitoring discussed including target goals and signs/symptoms of MI and CVA.   Discuss immunizations recommended for type 2 diabetes  Discuss sick day management, DKA (diabetic ketoacidosis) and HHNS (hyperosmolar, hyperglycemic non-ketotic syndrome)   Healthy Coping  Discuss support plan, stress reduction and diabetes distress    The patient verbalized understanding and has no further questions at this time  Written materials provided for all areas covered.  Recommendation: attend Step 5 Class    Karyn Cho RN, [facility]

## 2024-05-29 NOTE — TELEPHONE ENCOUNTER
From: Mae Mark  To: Mahi Valentino  Sent: 5/25/2024 8:40 AM CDT  Subject: Mae S Deedee [id number]    Hello.  I have realized that the scheduling for lung x-ray, etc. is under [facility] even though I don't understand why. Those are doctors for ear and eyes who I have only used a few times. I don't understand why these tests are listed in [facility].     I want to schedule a lung x-ray and have AC-1 blood test done in early June if possible.     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 I will contact them directly from now on.     It gets confusing trying to keep up between Lonnie and Danny....that said, thanks for your help.  Kasey Mark

## 2024-06-03 NOTE — TELEPHONE ENCOUNTER
TB, would we be able to order CT Lung screening? Last completed in July 2023 and 1 yr f/u was recommended. Discuss at 6/17 OV?

## 2024-06-11 ENCOUNTER — PATIENT MESSAGE (OUTPATIENT)
Dept: INTERNAL MEDICINE CLINIC | Facility: CLINIC | Age: 74
End: 2024-06-11

## 2024-06-11 ENCOUNTER — NURSE ONLY (OUTPATIENT)
Dept: ENDOCRINOLOGY CLINIC | Facility: CLINIC | Age: 74
End: 2024-06-11
Payer: COMMERCIAL

## 2024-06-11 DIAGNOSIS — E11.9 NEW ONSET TYPE 2 DIABETES MELLITUS (HCC): Primary | ICD-10-CM

## 2024-06-11 PROCEDURE — G0109 DIAB MANAGE TRN IND/GROUP: HCPCS | Performed by: DIETITIAN, REGISTERED

## 2024-06-11 NOTE — TELEPHONE ENCOUNTER
From: Mae Mark  To: Mahi Valentino  Sent: 6/11/2024 9:19 AM CDT  Subject: Mae Mark    Can't locate in My Chart where to schedule lung scan. I see only scheduling appt. with a doctor. The scheduling page is not found any longer. I see only where to schedule an EKG or blood work. Please advise & thanks.  Mae Mark  [id number]

## 2024-06-12 ENCOUNTER — LAB ENCOUNTER (OUTPATIENT)
Dept: LAB | Age: 74
End: 2024-06-12
Attending: INTERNAL MEDICINE
Payer: COMMERCIAL

## 2024-06-12 DIAGNOSIS — E11.9 NEW ONSET TYPE 2 DIABETES MELLITUS (HCC): ICD-10-CM

## 2024-06-12 DIAGNOSIS — L65.9 HAIR LOSS: ICD-10-CM

## 2024-06-12 LAB
ALBUMIN SERPL-MCNC: 3.3 G/DL (ref 3.4–5)
ALBUMIN/GLOB SERPL: 0.8 {RATIO} (ref 1–2)
ALP LIVER SERPL-CCNC: 84 U/L
ALT SERPL-CCNC: 22 U/L
ANION GAP SERPL CALC-SCNC: 8 MMOL/L (ref 0–18)
AST SERPL-CCNC: 33 U/L (ref 15–37)
BILIRUB SERPL-MCNC: 0.5 MG/DL (ref 0.1–2)
BUN BLD-MCNC: 23 MG/DL (ref 9–23)
CALCIUM BLD-MCNC: 9 MG/DL (ref 8.5–10.1)
CHLORIDE SERPL-SCNC: 107 MMOL/L (ref 98–112)
CHOLEST SERPL-MCNC: 169 MG/DL (ref ?–200)
CO2 SERPL-SCNC: 22 MMOL/L (ref 21–32)
CREAT BLD-MCNC: 1.06 MG/DL
CREAT UR-SCNC: 223 MG/DL
EGFRCR SERPLBLD CKD-EPI 2021: 56 ML/MIN/1.73M2 (ref 60–?)
EST. AVERAGE GLUCOSE BLD GHB EST-MCNC: 114 MG/DL (ref 68–126)
FASTING PATIENT LIPID ANSWER: YES
FASTING STATUS PATIENT QL REPORTED: YES
GLOBULIN PLAS-MCNC: 4.1 G/DL (ref 2.8–4.4)
GLUCOSE BLD-MCNC: 93 MG/DL (ref 70–99)
HBA1C MFR BLD: 5.6 % (ref ?–5.7)
HDLC SERPL-MCNC: 57 MG/DL (ref 40–59)
LDLC SERPL CALC-MCNC: 86 MG/DL (ref ?–100)
MICROALBUMIN UR-MCNC: 2.9 MG/DL
MICROALBUMIN/CREAT 24H UR-RTO: 13 UG/MG (ref ?–30)
NONHDLC SERPL-MCNC: 112 MG/DL (ref ?–130)
OSMOLALITY SERPL CALC.SUM OF ELEC: 287 MOSM/KG (ref 275–295)
POTASSIUM SERPL-SCNC: 3.8 MMOL/L (ref 3.5–5.1)
PROT SERPL-MCNC: 7.4 G/DL (ref 6.4–8.2)
SODIUM SERPL-SCNC: 137 MMOL/L (ref 136–145)
TRIGL SERPL-MCNC: 151 MG/DL (ref 30–149)
VLDLC SERPL CALC-MCNC: 24 MG/DL (ref 0–30)

## 2024-06-12 PROCEDURE — 83036 HEMOGLOBIN GLYCOSYLATED A1C: CPT | Performed by: INTERNAL MEDICINE

## 2024-06-12 PROCEDURE — 80061 LIPID PANEL: CPT | Performed by: INTERNAL MEDICINE

## 2024-06-12 PROCEDURE — 82043 UR ALBUMIN QUANTITATIVE: CPT | Performed by: INTERNAL MEDICINE

## 2024-06-12 PROCEDURE — 82570 ASSAY OF URINE CREATININE: CPT | Performed by: INTERNAL MEDICINE

## 2024-06-12 PROCEDURE — 80053 COMPREHEN METABOLIC PANEL: CPT | Performed by: INTERNAL MEDICINE

## 2024-06-12 NOTE — PROGRESS NOTES
Mae Mark : 1950 attended Step 5 Group Class: Heart Healthy Eating and Exercise    Date: 2024           Referring Provider: Mahi Valentino MD                   Start time: 5:30 pm       End time: 7:30 pm     The patient participated during the class: Patient verbalized dietary changes recommended to reduce saturated fat and sodium and to increase dietary fiber.      Healthy Eating  Discussed heart healthy diet (types of fat, cholesterol, fiber and sodium)   Mediterranean and DASH diets including sample meal plans   Discussed reading nutrition facts labels, nutrition claims  Discussed challenges at the grocery store and when eating out and options                       Being Active  Discussed benefits and precautions of regular physical activity (aerobic exercise and strength training)     Monitoring  Discussed blood pressure goals    Behavior Change  Reviewed and updated individual goals and action plan set by patient.     Problem Solving  Reviewed progress on overall goals to-date  Addressed barriers to setting and following guidelines presented/achieving goals, as a group discussion.     Recommendations:   Work toward limiting saturated fat and sodium and increasing fiber in diet.  Work toward getting 30 minutes of aerobic activity up to 5 or more days/week.  Continue implementing individual goals.  Follow up through MNT or DSMT as needed.  Complete Diabetes Continued Care/ADA Recommended Screenings (labs, eye exam, foot exam, dental exam, vaccines).    Written materials provided for all areas covered.          Lisa Navarro RDN, NISSAN, JUVENCIOES

## 2024-06-17 ENCOUNTER — OFFICE VISIT (OUTPATIENT)
Dept: INTERNAL MEDICINE CLINIC | Facility: CLINIC | Age: 74
End: 2024-06-17
Payer: COMMERCIAL

## 2024-06-17 VITALS
OXYGEN SATURATION: 98 % | BODY MASS INDEX: 22.52 KG/M2 | HEIGHT: 62 IN | TEMPERATURE: 97 F | WEIGHT: 122.38 LBS | SYSTOLIC BLOOD PRESSURE: 116 MMHG | HEART RATE: 67 BPM | RESPIRATION RATE: 16 BRPM | DIASTOLIC BLOOD PRESSURE: 52 MMHG

## 2024-06-17 DIAGNOSIS — E11.9 DIET-CONTROLLED DIABETES MELLITUS (HCC): Primary | ICD-10-CM

## 2024-06-17 DIAGNOSIS — K46.9 PERISTOMAL HERNIA: ICD-10-CM

## 2024-06-17 DIAGNOSIS — L65.9 HAIR LOSS: ICD-10-CM

## 2024-06-17 PROBLEM — L98.9 SCALP LESION: Status: ACTIVE | Noted: 2024-06-17

## 2024-06-17 PROCEDURE — 99214 OFFICE O/P EST MOD 30 MIN: CPT | Performed by: INTERNAL MEDICINE

## 2024-06-17 PROCEDURE — 3074F SYST BP LT 130 MM HG: CPT | Performed by: INTERNAL MEDICINE

## 2024-06-17 PROCEDURE — G2211 COMPLEX E/M VISIT ADD ON: HCPCS | Performed by: INTERNAL MEDICINE

## 2024-06-17 PROCEDURE — 3061F NEG MICROALBUMINURIA REV: CPT | Performed by: INTERNAL MEDICINE

## 2024-06-17 PROCEDURE — 3078F DIAST BP <80 MM HG: CPT | Performed by: INTERNAL MEDICINE

## 2024-06-17 PROCEDURE — 3044F HG A1C LEVEL LT 7.0%: CPT | Performed by: INTERNAL MEDICINE

## 2024-06-17 PROCEDURE — 3008F BODY MASS INDEX DOCD: CPT | Performed by: INTERNAL MEDICINE

## 2024-06-17 NOTE — PROGRESS NOTES
Mae Mark is a 73 year old female.    Chief Complaint   Patient presents with    Follow - Up     Diabetes, hair loss, hernia       HPI:     Pleasant patient with diabetes type 2, Crohns, CKD here for follow up. She is off the prednisone for over a month. She watches her diet. Diabetes well controlled with hgba1c of 5.6 (from 7.1). discussed prednisone was likely the biggest factor in her h/o uncontrolled diabetes. She is off glipizide too,  BG now diet controlled. Crohns is also doing ok, no acute n/v/pain. Only major complaint today is growing hernia in RLQ and pain with coughing in that region. Hernia present for years per patient but now somewhat symptomatic.   She is c/o hair loss for 1+ month, notices a lot of hair falling when she showers. No patches, more of generalized thinning. Iron studies and tsh added to labs she already completed last week         Patient Active Problem List   Diagnosis    Primary localized osteoarthrosis, hand    Arthritis    Crohn's disease (HCC)    Retroperitoneal lymphadenopathy    Elevated uric acid in blood    Asthma-COPD overlap syndrome (HCC)    B12 deficiency    Tinnitus of both ears    Dry eyes    Degenerative disc disease, lumbar    Swollen lymph nodes    Peristomal hernia    Acute kidney injury (HCC)    Azotemia    Hyperglycemia    Abdominal pain of unknown etiology    Hyponatremia    History of acute renal failure    Fungal infection of toenail    Odynophagia    Regional enteritis of small intestine with large intestine (HCC)    Elevated glucose    Renal insufficiency    Diet-controlled diabetes mellitus (HCC)    Fungal rash of torso    Right-sided chest wall pain    Bilateral leg edema    Scalp lesion    Hair loss     Current Outpatient Medications   Medication Sig Dispense Refill    hydroCHLOROthiazide 12.5 MG Oral Cap Take 1 capsule (12.5 mg total) by mouth daily. 30 capsule 0    Glucose Blood (ACCU-CHEK GUIDE) In Vitro Strip Check blood sugar 2 times daily       Accu-Chek FastClix Lancets Does not apply Misc 1 Lancet by Finger stick route 2 (two) times daily.      ketoconazole 2 % External Cream Apply 1 Application topically 2 (two) times daily. APPLY TO AFFECTED AREA      potassium citrate-citric acid 1100-334 MG/5ML Oral Solution Take 15 mL (30 mEq total) by mouth daily with breakfast. 473 mL 11    clotrimazole-betamethasone 1-0.05 % External Cream Apply to affected area BID for 2 weeks 60 g 2    HYDROcodone-acetaminophen (NORCO) 5-325 MG Oral Tab Take 1 tablet by mouth every 6 (six) hours as needed for Pain. 30 tablet 0    ondansetron (ZOFRAN) 4 mg tablet Take 1 tablet (4 mg total) by mouth every 8 (eight) hours as needed for Nausea. 60 tablet 0    metRONIDAZOLE 0.75 % External Cream apply TO AFFECTED AREA ON face EVERY DAY BEFORE NOON      pimecrolimus 1 % External Cream apply topically TWICE DAILY TO affected AREAS      Sulfacetamide Sodium-Sulfur 10-5 % External Liquid Apply 1 Application topically 2 (two) times daily.      triamcinolone 0.1 % External Cream APPLY TO THE AFFECTED AREA(S) ON BACK TWICE DAILY FOR UP TO TWO WEEKS. AVOID face, armpits AND groin      albuterol (VENTOLIN HFA) 108 (90 Base) MCG/ACT Inhalation Aero Soln Inhale 2 puffs into the lungs every 6 (six) hours as needed for Wheezing. 1 each 3    Tiotropium Bromide Monohydrate (SPIRIVA RESPIMAT) 2.5 MCG/ACT Inhalation Aero Soln Inhale 2 puffs into the lungs daily. 1 each 3    acetaminophen 500 MG Oral Tab Take 1 tablet (500 mg total) by mouth every 6 (six) hours as needed for Pain.      Omeprazole 40 MG Oral Capsule Delayed Release Take 1 capsule (40 mg total) by mouth daily. (Patient taking differently: Take 1 capsule (40 mg total) by mouth every other day.) 90 capsule 3    glipiZIDE ER 2.5 MG Oral Tablet 24 Hr Take 1 tablet (2.5 mg total) by mouth daily with breakfast. 90 tablet 1    Accu-Chek FastClix Lancets Does not apply Misc 1 Lancet by Finger stick route 2 (two) times daily. Use as directed.  (Patient not taking: Reported on 6/17/2024) 204 each 3    PREDNISONE 5 MG Oral Tab TAKE EIGHT TABLETS BY MOUTH EVERY DAY with breakfast 240 tablet 1      Past Medical History:    Abdominal distention    Abdominal hernia    2021    Abdominal pain    years ago before ostomy    Anemia    occasional in the past    Anxiety    Arthritis    Asthma (HCC)    Atypical mole    Back pain    Bad breath    mouth hot/dry    Belching    Some liquids have begun causing belching. Sodas, sometimes water    Black stools    years ago    Bleeding nose    Bloating    Blood in the stool    years ago    Blood in urine    Blurred vision    Body piercing    Calculus of kidney    Have just been told I have kidney stones by [facility] Doctor    Change in hair    hair drier than usual    Chest pain on exertion    pressure    Chronic cough    Crohn's disease (HCC)    Diabetes mellitus (HCC)    Diarrhea, unspecified    Dizziness    still not steady    Easy bruising    Fatigue    Fever    Flatulence/gas pain/belching    No more than normal    Food intolerance    Frequent urination    Headache disorder    from deyhdration?    Hearing impairment    hearing aids    Hearing loss    Dr. Perea    Heartburn    Hemorrhoids    High cholesterol    Indigestion    have no gallbladder    Irregular bowel habits    chron's    Itch of skin    all over/not severe but bothersome when skin very dry. Scratch & is inflammed    Kidney failure    mild    Leg swelling    ankles-once in a while    Loss of appetite    Mouth sores    fever blisters inside lips/tongue    Nausea    since January 2018-occasional    Night sweats    Osteoarthritis    hands, knees    Pain in joints    Painful swallowing    not painful but uncomfortable    Painful urination    Prediabetes    Problems with swallowing    Began taking Terbinafine March 22, 2023 for a toenail fungal infection. Began noticing irritated throat/top of gut were burning and swallowing becoming uncomfortable. Also  experiencing weakness, dehydration, nausea, taste buds gone, loss of appetite    Rash    Shortness of breath    smoker    Skin blushing/flushing    years ago with active crohn's/before ileostomy    Sleep apnea    trouble sleeping since 2023    Sleep disturbance    Sputum production    Stool incontinence    years ago    Stress    typical on occasion    Uncomfortable fullness after meals    Visual impairment    glasses    Vomiting    Wears glasses    Weight gain    Weight loss    dieted in 2022. lost about 8 lbs. Regained about 4.  taste buds destroyed (temporarily?) due to reaction to Terbinafine. Lost about 20 lbs. between late April & .    Wheezing      Social History:  Social History     Socioeconomic History    Marital status:    Tobacco Use    Smoking status: Every Day     Current packs/day: 0.00     Average packs/day: 0.5 packs/day for 50.0 years (25.0 ttl pk-yrs)     Types: Cigarettes     Start date: 1973     Last attempt to quit: 2023     Years since quittin.9    Smokeless tobacco: Never    Tobacco comments:     tried Chantix three times-after about 2 months I became depressed and irritable.   Vaping Use    Vaping status: Never Used   Substance and Sexual Activity    Alcohol use: Not Currently     Comment: very rarely consume alcohol    Drug use: Not Currently     Types: Cannabis    Sexual activity: Not Currently   Social History Narrative    , no children.  Retired, previously worked in [employer].     Family History   Problem Relation Age of Onset    Diabetes Father         father    Cancer Father         lung, +smoker    Crohn's Disease Sister     Crohn's Disease Sister         Crohn's - her son also has Crohn's now, also my great niece has Crohn's    Diabetes Brother     Crohn's Disease Brother             Cancer Brother         lung cancer, +smoker    Breast Cancer Maternal Grandmother 80            Crohn's Disease Nephew      Crohn's Disease Other         Allergies  Allergies   Allergen Reactions    Mold Spores ASTHMA, ITCHING, Runny nose and WHEEZING         REVIEW OF SYSTEMS:   GENERAL HEALTH:  no fevers , hair loss  RESPIRATORY: no cough  CARDIOVASCULAR: denies chest pain  GI: + abdominal pain with cough  : no dysuria  NEURO: denies headaches  PSYCH: No reported depression   HEME: No adenopathy      EXAM:   /52 (BP Location: Left arm, Patient Position: Sitting, Cuff Size: adult)   Pulse 67   Temp 96.7 °F (35.9 °C) (Skin)   Resp 16   Ht 5' 2\" (1.575 m)   Wt 122 lb 6.4 oz (55.5 kg)   LMP  (LMP Unknown)   SpO2 98%   BMI 22.39 kg/m²   GENERAL: well developed, well nourished,in no apparent distress  HEENT: atraumatic, normocephalic, generalized hair thinning  NECK: supple,no adenopathy  LUNGS: normal rate without respiratory distress, lungs clear to auscultation  CARDIO: RRR nl S1 S2  GI: normal bowel sounds, soft, colostomy RLQ, +peristomal hernia with mild TTP, no R/G  EXTREMITIES: no cyanosis, clubbing or edema  NEURO: Alert and oriented    ASSESSMENT AND PLAN:     Encounter Diagnoses   Name     Hair loss- check iron studies and tsh, advised to follow up with her derm (Dr. Lebron) if labs WNL     Diet-controlled diabetes mellitus (HCC)- hgba1c 5.6 c/w good control, likely diabetes uncontrolled before due to prednisone. She is off the prednisone for 1+ month, continue dm diet. Monitor hgba1c     Peristomal hernia- enlarging in size and pain with cough, will get opinion from Dr. Brewer about surgery for hernia        Orders Placed This Encounter   Procedures    TSH W Reflex To Free T4 [E]    Iron And Tibc [E]    Ferritin [E]    Hemoglobin A1C [E]    Basic Metabolic Panel (8) [E]       Meds & Refills for this Visit:  Requested Prescriptions      No prescriptions requested or ordered in this encounter       Imaging & Consults:  SURGERY - INTERNAL    Return in about 3 months (around 9/27/2024), or if symptoms worsen or fail  to improve, for follow up after labs.  There are no Patient Instructions on file for this visit.      The patient indicates understanding of these issues and agrees to the plan.

## 2024-07-16 ENCOUNTER — HOSPITAL ENCOUNTER (OUTPATIENT)
Dept: CT IMAGING | Facility: HOSPITAL | Age: 74
Discharge: HOME | End: 2024-07-16
Attending: INTERNAL MEDICINE
Payer: COMMERCIAL

## 2024-07-16 DIAGNOSIS — R91.8 ABNORMAL CT SCAN OF LUNG: ICD-10-CM

## 2024-07-16 PROCEDURE — 71271 CT THORAX LUNG CANCER SCR C-: CPT | Performed by: INTERNAL MEDICINE

## 2024-07-17 ENCOUNTER — PATIENT MESSAGE (OUTPATIENT)
Dept: INTERNAL MEDICINE CLINIC | Facility: CLINIC | Age: 74
End: 2024-07-17

## 2024-07-17 ENCOUNTER — NURSE TRIAGE (OUTPATIENT)
Dept: FAMILY MEDICINE CLINIC | Facility: CLINIC | Age: 74
End: 2024-07-17

## 2024-07-17 NOTE — TELEPHONE ENCOUNTER
Action Requested: Summary for Provider     []  Critical Lab, Recommendations Needed  [] Need Additional Advice  []   FYI    []   Need Orders  [] Need Medications Sent to Pharmacy  []  Other     SUMMARY: arm injury  Triage call transferred.   Spoke with pt stating day 3 after injured left arm, hit arm on doorknob. Has been keeping arm bandaged however, every time removes bandage starts to bleed. Advised pt should go to [facility] to be further evaluated and treated. Pt said, OK and hung up.    Reason for Disposition   Bleeding won't stop after 10 minutes of direct pressure (using correct technique)    Answer Assessment - Initial Assessment Questions  1. MECHANISM: \"How did the injury happen?\"      Hit on door knob  2. ONSET: \"When did the injury happen?\" (Minutes or hours ago)       X3 days ago  3. LOCATION: \"Where is the injury located?\" \"Which arm?\"      Left arm  4. APPEARANCE of INJURY: \"What does the injury look like?\"        bleeding  5. SEVERITY: \"Can you use the arm normally?\"       yes  6. SWELLING or BRUISING: \"is there any swelling or bruising?\" If Yes, ask: \"How large is it? (e.g., inches, centimeters)       No   7. PAIN: \"Is there pain?\" If Yes, ask: \"How bad is the pain?\"    (Scale 1-10; or mild, moderate, severe)    - NONE (0): No pain.    - MILD (1-3): Doesn't interfere with normal activities.    - MODERATE (4-7): Interferes with normal activities (e.g., work or school) or awakens from sleep.    - SEVERE (8-10): Excruciating pain, unable to do any normal activities, unable to hold a cup of water.      No , burning sensation  8. TETANUS: For any breaks in the skin, ask: \"When was the last tetanus booster?\"      unknown  9. OTHER SYMPTOMS: \"Do you have any other symptoms?\"  (e.g., numbness in hand)      No   10. PREGNANCY: \"Is there any chance you are pregnant?\" \"When was your last menstrual period?\"        No    Protocols used: Arm Injury-A-[place]

## 2024-07-18 NOTE — TELEPHONE ENCOUNTER
From: Mae Mark  To: Mahi Valentino  Sent: 7/17/2024 10:03 AM CDT  Subject: Arm Wound    Hello.   Knocked arm against door handle about 3-4 days ago. Skin very thin these days. Have kept a bandage on wound but when I replace it my arm still bleeds. I just now decided to leave bandage off, allied Polysporin, and will leave uncovered to see how it heals. The bruising is from just taking bandage off. I’ll reach out again in a few days to let you know healing progress.  Thank you.  Kasey

## 2024-07-29 DIAGNOSIS — E11.9 NEW ONSET TYPE 2 DIABETES MELLITUS (HCC): ICD-10-CM

## 2024-08-01 NOTE — PROGRESS NOTES
HPI:     Mae Mark is a 74 year old female with a PMH of anxiety, HL, DM, PERLA, Crohn's s/p total colectomy + ileostomy (1979), partial SBR, OA.  Following for:  1. Recurrent CaOx kidney stones  - right ureteral stone incidentally noted on imaging. S/p right URS 2/8/24. Known left renal stones.  - no prior episodes  2. Severe hypocitraturia + low UOP  - 30 urocit solution 2/14/24  - 24 h urine 2/25/24: very low UOP, citrate (1175, < 23)  3. [facility]     PCP - Chicho  LOV 2/28/24    Presents for check-up, review 24 h urine, discuss stone prevention.  On infusions now for Crohn's flares and feels weak most days.  No flank pain, hematuria, dysuria.  She is taking 30 mEq urocit solution as she couldn't swallow the pills and has not increased water intake.   CT showed right ureteral stone had been present since at least Nov 2023.    UTI hx: none  Gross hematuria: episode ~ 30 y ago, nothing since  Tobacco hx: ~ 80 pack years, currently 1/2 PPD  Fam h/o  malignancy: none    UA is negative    Reported ~ 20-30 oz water, 12 coffee with medium to light yellow urine. Drinking about the same amount. I encouraged the pt drink at least 40-60 oz water per day or enough to keep urine clear to pale yellow for stone prevention.    CT SP 8/14/24: 4-5 RMP, 1,1 RLP. Stable L renal stones.  CT SP 2/2/24: 2 RUP, 4 RMP, 7-8 R prox ureter. 3 LUP, 4, 4 LLP  CT AP 11/24/23: 7 proximal right ureteral, 4 RMP. 74 x 78 mm left renal cyst  CT SP 7/15/23: 4 RMP, 8 RLP    Discussed options for recurrent stones and will recheck 24 h urine. She wants to check a KUB to see if she may be a candidate for ESWL for the new ~ 4-5 mm RMP stone. Continue 30 mEq urocit solution.  Phone visit once KUB results are back. She is open to what I recommend for the RMP stone but OK treating it with either ESWL or URS.    Prior note:  If she has passed stone I would still recommend cystoscopy at some point given AMH in the past.    She will significantly  increase water intake for stone prevention, checking CT SP and OR as noted above. She requests Nephrology referral for CKD. Can consider 24 h urine later but will discuss this at NOV.    HISTORY:  Past Medical History:    Abdominal distention    Abdominal hernia    2021    Abdominal pain    years ago before ostomy    Anemia    occasional in the past    Anxiety    Arthritis    Asthma (HCC)    Atypical mole    Back pain    Bad breath    mouth hot/dry    Belching    Some liquids have begun causing belching. Sodas, sometimes water    Black stools    years ago    Bleeding nose    Bloating    Blood in the stool    years ago    Blood in urine    Blurred vision    Body piercing    Calculus of kidney    Have just been told I have kidney stones by [facility] Doctor    Change in hair    hair drier than usual    Chest pain on exertion    pressure    Chronic cough    Crohn's disease (HCC)    Diabetes mellitus (HCC)    Diarrhea, unspecified    Dizziness    still not steady    Easy bruising    Fatigue    Fever    Flatulence/gas pain/belching    No more than normal    Food intolerance    Frequent urination    Headache disorder    from deyhdration?    Hearing impairment    hearing aids    Hearing loss    Dr. Perea    Heartburn    Hemorrhoids    High cholesterol    Indigestion    have no gallbladder    Irregular bowel habits    chron's    Itch of skin    all over/not severe but bothersome when skin very dry. Scratch & is inflammed    Kidney failure    mild    Leg swelling    ankles-once in a while    Loss of appetite    Mouth sores    fever blisters inside lips/tongue    Nausea    since January 2018-occasional    Night sweats    Osteoarthritis    hands, knees    Pain in joints    Painful swallowing    not painful but uncomfortable    Painful urination    Prediabetes    Problems with swallowing    Began taking Terbinafine March 22, 2023 for a toenail fungal infection. Began noticing irritated throat/top of gut were burning  and swallowing becoming uncomfortable. Also experiencing weakness, dehydration, nausea, taste buds gone, loss of appetite    Rash    Shortness of breath    smoker    Skin blushing/flushing    years ago with active crohn's/before ileostomy    Sleep apnea    trouble sleeping since 2023    Sleep disturbance    Sputum production    Stool incontinence    years ago    Stress    typical on occasion    Uncomfortable fullness after meals    Visual impairment    glasses    Vomiting    Wears glasses    Weight gain    Weight loss    dieted in 2022. lost about 8 lbs. Regained about 4.  taste buds destroyed (temporarily?) due to reaction to Terbinafine. Lost about 20 lbs. between late April & .    Wheezing      Past Surgical History:   Procedure Laterality Date    Appendectomy      removed during ileostomy?    Bowel resection      Cataract Left 2018    Cataract Right 10/2018    Cholecystectomy      Colectomy      Colonoscopy  ?    over 10 years    Correct bunion,simple      Hand/finger surgery unlisted Right     trigger thumb release    Hand/finger surgery unlisted Left 2014    A1 Pulley release    Part removal colon w end colostomy      colon resection w/ileostomy    Revision of ileostomy,simple        Family History   Problem Relation Age of Onset    Diabetes Father         father    Cancer Father         lung, +smoker    Crohn's Disease Sister     Crohn's Disease Sister         Crohn's - her son also has Crohn's now, also my great niece has Crohn's    Diabetes Brother     Crohn's Disease Brother             Cancer Brother         lung cancer, +smoker    Breast Cancer Maternal Grandmother 80            Crohn's Disease Nephew     Crohn's Disease Other       Social History:   Social History     Socioeconomic History    Marital status:    Tobacco Use    Smoking status: Every Day     Current packs/day: 0.00     Average packs/day: 0.5 packs/day for 50.0 years (25.0  ttl pk-yrs)     Types: Cigarettes     Start date: 1973     Last attempt to quit: 2023     Years since quittin.1    Smokeless tobacco: Never    Tobacco comments:     tried Chantix three times-after about 2 months I became depressed and irritable.   Vaping Use    Vaping status: Never Used   Substance and Sexual Activity    Alcohol use: Not Currently     Comment: very rarely consume alcohol    Drug use: Not Currently     Types: Cannabis    Sexual activity: Not Currently   Social History Narrative    , no children.  Retired, previously worked in [employer].        Medications (Active prior to today's visit):  Current Outpatient Medications   Medication Sig Dispense Refill    potassium citrate-citric acid 1100-334 MG/5ML Oral Solution Take 15 mL (30 mEq total) by mouth daily with breakfast. 473 mL 11    escitalopram 5 MG Oral Tab Take 1 tablet (5 mg total) by mouth daily. 30 tablet 1    Glucose Blood (ACCU-CHEK GUIDE) In Vitro Strip Use 1 (one) new strip daily for blood glucose monitoring 100 strip 3    hydroCHLOROthiazide 12.5 MG Oral Cap Take 1 capsule (12.5 mg total) by mouth daily. 30 capsule 0    Accu-Chek FastClix Lancets Does not apply Misc 1 Lancet by Finger stick route 2 (two) times daily.      ketoconazole 2 % External Cream Apply 1 Application topically 2 (two) times daily. APPLY TO AFFECTED AREA      clotrimazole-betamethasone 1-0.05 % External Cream Apply to affected area BID for 2 weeks 60 g 2    HYDROcodone-acetaminophen (NORCO) 5-325 MG Oral Tab Take 1 tablet by mouth every 6 (six) hours as needed for Pain. 30 tablet 0    ondansetron (ZOFRAN) 4 mg tablet Take 1 tablet (4 mg total) by mouth every 8 (eight) hours as needed for Nausea. 60 tablet 0    metRONIDAZOLE 0.75 % External Cream apply TO AFFECTED AREA ON face EVERY DAY BEFORE NOON      pimecrolimus 1 % External Cream apply topically TWICE DAILY TO affected AREAS      Sulfacetamide Sodium-Sulfur 10-5 % External Liquid Apply 1  Application topically 2 (two) times daily.      triamcinolone 0.1 % External Cream APPLY TO THE AFFECTED AREA(S) ON BACK TWICE DAILY FOR UP TO TWO WEEKS. AVOID face, armpits AND groin      albuterol (VENTOLIN HFA) 108 (90 Base) MCG/ACT Inhalation Aero Soln Inhale 2 puffs into the lungs every 6 (six) hours as needed for Wheezing. 1 each 3    Tiotropium Bromide Monohydrate (SPIRIVA RESPIMAT) 2.5 MCG/ACT Inhalation Aero Soln Inhale 2 puffs into the lungs daily. 1 each 3    acetaminophen 500 MG Oral Tab Take 1 tablet (500 mg total) by mouth every 6 (six) hours as needed for Pain.         Allergies:  Allergies   Allergen Reactions    Mold Spores ASTHMA, ITCHING, Runny nose and WHEEZING         ROS:     A comprehensive 10 point review of systems was completed.  Pertinent positives and negatives noted in the the HPI.    PHYSICAL EXAM:     GENERAL APPEARANCE: well, developed, well nourished, in no acute distress  NEUROLOGIC: nonfocal, alert and oriented  HEAD: normocephalic, atraumatic  EYES: sclera non-icteric  EARS: hearing intact  ORAL CAVITY: mucosa moist  NECK/THYROID: no obvious goiter or masses  LUNGS: nonlabored breathing  ABDOMEN: soft, no obvious masses or tenderness  SKIN: no obvious rashes    : as noted above     ASSESSMENT/PLAN:   Diagnoses and all orders for this visit:    Recurrent kidney stones  -     XR ABDOMEN (1 VIEW) (CPT=74018); Future  -     Kidney Stone Urine Test Combination With Saturation Calculations; Future    Hypocitraturia  -     potassium citrate-citric acid 1100-334 MG/5ML Oral Solution; Take 15 mL (30 mEq total) by mouth daily with breakfast.    - as noted above.    Thanks again for this consult.    Joao Marie MD, FACS  Urologist  [facility]  Office: [phone]

## 2024-08-01 NOTE — TELEPHONE ENCOUNTER
Test strip prescription pended for twice daily blood sugar testing for insurance purposes.     **Patient states she tests twice daily.   Original prescription was written for use as directed.    Future Appointments   Date Time Provider Department Center   8/8/2024  9:20 AM Mahi Valentino MD [facility]     LAST OFFICE VISIT: 6/17/2024    Requested Prescriptions   Pending Prescriptions Disp Refills    ACCU-CHEK GUIDE In Vitro Strip [Pharmacy Med Name: Accu-Chek Guide test strips] 100 strip 1     Sig: USE AS DIRECTED       Diabetic Supplies Protocol Passed - 8/1/2024  9:10 AM        Passed - In person appointment or virtual visit in the past 12 mos or appointment in next 3 mos     Recent Outpatient Visits              1 month ago Crohn's disease of small intestine without complication ([facility])    [facility],  [facility]    Nurse Only    1 month ago Diet-controlled diabetes mellitus ([facility])    [address] Mahi Valentino MD    Office Visit    1 month ago New onset type 2 diabetes mellitus ([facility])    [address]    Nurse Only    1 month ago Crohn's disease of small intestine without complication ([facility])    [facility],  [facility] Last, PARESH Suero    Office Visit    2 months ago New onset type 2 diabetes mellitus ([facility])    [address]    Nurse Only          Future Appointments         Provider Department Appt Notes    In 1 week Mahi Valetnino MD [address] find out what is happening    In 1 week INFUSION [facility] NURSE [facility],  [facility] B&B ( 3)  [facility] #5 at 300 mg based on 5 mg/kg every 8 weeks for CD. AE    In 1 week EH CT MAIN [address] CT Current Status of kidney health    In 1 week Joao Marie MD [facility] 6 month follow up CT Prior                          Future Appointments         Provider Department Appt Notes    In 1 week Mahi Valentino MD [address] find out what is happening    In 1 week INFUSION ECC NURSE [facility],  [facility] B&B ( 3)  [facility] #5 at 300 mg based on 5 mg/kg every 8 weeks for CD. AE    In 1 week EH CT MAIN [address] CT Current Status of kidney health    In 1 week Joao Marie MD [facility], [facility] 6 month follow up CT Prior          Recent Outpatient Visits              1 month ago Crohn's disease of small intestine without complication ([facility])    [facility],  [facility]    Nurse Only    1 month ago Diet-controlled diabetes mellitus (HCC)    [address] Mahi Valentino MD    Office Visit    1 month ago New onset type 2 diabetes mellitus (HCC)    [address]    Nurse Only    1 month ago Crohn's disease of small intestine without complication (HCC)    [facility],  [facility] Last, PARESH Suero    Office Visit    2 months ago New onset type 2 diabetes mellitus (HCC)    [address]    Nurse Only

## 2024-08-02 RX ORDER — BLOOD SUGAR DIAGNOSTIC
STRIP MISCELLANEOUS
Qty: 100 STRIP | Refills: 3 | Status: SHIPPED | OUTPATIENT
Start: 2024-08-02

## 2024-08-03 ENCOUNTER — HOSPITAL ENCOUNTER (OUTPATIENT)
Age: 74
Discharge: HOME | End: 2024-08-03
Payer: COMMERCIAL

## 2024-08-03 VITALS
WEIGHT: 123 LBS | BODY MASS INDEX: 23.22 KG/M2 | SYSTOLIC BLOOD PRESSURE: 113 MMHG | HEART RATE: 70 BPM | OXYGEN SATURATION: 96 % | HEIGHT: 61 IN | RESPIRATION RATE: 16 BRPM | DIASTOLIC BLOOD PRESSURE: 40 MMHG | TEMPERATURE: 99 F

## 2024-08-03 DIAGNOSIS — B34.9 VIRAL SYNDROME: Primary | ICD-10-CM

## 2024-08-03 DIAGNOSIS — J02.9 VIRAL PHARYNGITIS: ICD-10-CM

## 2024-08-03 LAB
S PYO AG THROAT QL IA.RAPID: NEGATIVE
SARS-COV-2 RNA RESP QL NAA+PROBE: NOT DETECTED

## 2024-08-03 PROCEDURE — 99212 OFFICE O/P EST SF 10 MIN: CPT

## 2024-08-03 PROCEDURE — 99213 OFFICE O/P EST LOW 20 MIN: CPT

## 2024-08-03 PROCEDURE — 87651 STREP A DNA AMP PROBE: CPT | Performed by: PHYSICIAN ASSISTANT

## 2024-08-03 NOTE — ED PROVIDER NOTES
Patient Seen in: [facility]      History     Chief Complaint   Patient presents with    Sore Throat     Stated Complaint: sore throat, headache, swollen tonsils    Subjective:   HPI    74-year-old female with known history of Crohn's disease, diabetes hyperlipidemia who comes in today complaining of sore throat postnasal drip generalized fatigue and headache.  Patient is concerned for COVID.  Patient denies known sick contacts.  Patient denies shortness of breath or chest pain.  Patient has chronic cough from smoking    Objective:   Past Medical History:    Abdominal distention    Abdominal hernia    2021    Abdominal pain    years ago before ostomy    Anemia    occasional in the past    Anxiety    Arthritis    Asthma (HCC)    Atypical mole    Back pain    Bad breath    mouth hot/dry    Belching    Some liquids have begun causing belching. Sodas, sometimes water    Black stools    years ago    Bleeding nose    Bloating    Blood in the stool    years ago    Blood in urine    Blurred vision    Body piercing    Calculus of kidney    Have just been told I have kidney stones by [facility] Doctor    Change in hair    hair drier than usual    Chest pain on exertion    pressure    Chronic cough    Crohn's disease (HCC)    Diabetes mellitus (HCC)    Diarrhea, unspecified    Dizziness    still not steady    Easy bruising    Fatigue    Fever    Flatulence/gas pain/belching    No more than normal    Food intolerance    Frequent urination    Headache disorder    from deyhdration?    Hearing impairment    hearing aids    Hearing loss    Dr. Perea    Heartburn    Hemorrhoids    High cholesterol    Indigestion    have no gallbladder    Irregular bowel habits    chron's    Itch of skin    all over/not severe but bothersome when skin very dry. Scratch & is inflammed    Kidney failure    mild    Leg swelling    ankles-once in a while    Loss of appetite    Mouth sores    fever blisters inside lips/tongue     Nausea    since January 2018-occasional    Night sweats    Osteoarthritis    hands, knees    Pain in joints    Painful swallowing    not painful but uncomfortable    Painful urination    Prediabetes    Problems with swallowing    Began taking Terbinafine March 22, 2023 for a toenail fungal infection. Began noticing irritated throat/top of gut were burning and swallowing becoming uncomfortable. Also experiencing weakness, dehydration, nausea, taste buds gone, loss of appetite    Rash    Shortness of breath    smoker    Skin blushing/flushing    years ago with active crohn's/before ileostomy    Sleep apnea    trouble sleeping since June 2023    Sleep disturbance    Sputum production    Stool incontinence    years ago    Stress    typical on occasion    Uncomfortable fullness after meals    Visual impairment    glasses    Vomiting    Wears glasses    Weight gain    Weight loss    dieted in Sept. 2022. lost about 8 lbs. Regained about 4. April, 2023 taste buds destroyed (temporarily?) due to reaction to Terbinafine. Lost about 20 lbs. between late April & September, 2023.    Wheezing              Past Surgical History:   Procedure Laterality Date    Appendectomy      removed during ileostomy?    Bowel resection      Cataract Left 09/2018    Cataract Right 10/2018    Cholecystectomy      Colectomy      Colonoscopy  ?    over 10 years    Correct bunion,simple      Hand/finger surgery unlisted Right     trigger thumb release    Hand/finger surgery unlisted Left 06/19/2014    A1 Pulley release    Part removal colon w end colostomy      colon resection w/ileostomy    Revision of ileostomy,simple                  Social History     Socioeconomic History    Marital status:    Tobacco Use    Smoking status: Every Day     Current packs/day: 0.00     Average packs/day: 0.5 packs/day for 50.0 years (25.0 ttl pk-yrs)     Types: Cigarettes     Start date: 7/5/1973     Last attempt to quit: 7/5/2023     Years since quitting:  1.0    Smokeless tobacco: Never    Tobacco comments:     tried Chantix three times-after about 2 months I became depressed and irritable.   Vaping Use    Vaping status: Never Used   Substance and Sexual Activity    Alcohol use: Not Currently     Comment: very rarely consume alcohol    Drug use: Not Currently     Types: Cannabis    Sexual activity: Not Currently   Social History Narrative    , no children.  Retired, previously worked in administration.              Review of Systems    Positive for stated Chief Complaint: Sore Throat    Other systems are as noted in HPI.  Constitutional and vital signs reviewed.      All other systems reviewed and negative except as noted above.    Physical Exam     ED Triage Vitals [08/03/24 1328]   /40   Pulse 70   Resp 16   Temp 98.6 °F (37 °C)   Temp src Oral   SpO2 96 %   O2 Device None (Room air)       Current Vitals:   Vital Signs  BP: 113/40  Pulse: 70  Resp: 16  Temp: 98.6 °F (37 °C)  Temp src: Oral    Oxygen Therapy  SpO2: 96 %  O2 Device: None (Room air)            Physical Exam    General Appearance: Alert, cooperative, no distress, appropriate for age   Head: Normocephalic, without obvious abnormality   Eyes: PERRL,  conjunctiva and cornea clear, both eyes   Ears: TM pearly gray color and semitransparent, external ear canals normal, both ears   Nose: Nares symmetrical, septum midline, mucosa normal, clear watery discharge; no sinus tenderness   Throat: Lips, tongue, and mucosa are moist, pink, and intact; teeth intact. No erythema, no exudates or tonsillar hypertrophy, uvula midline, no trismus or drooling no phonation changes, patient handling secretions well   Neck: Supple; no anterior or posterior cervical adenopathy, no neck rigidity or meningeal signs  Lungs: Clear to auscultation bilaterally, respirations unlabored. No wheezing, rales or rhonchi.   Heart: NSR, S1, S2 present. No murmurs, rubs or gallops.  Skin: no rash       ED Course     Labs Reviewed    RAPID SARS-COV-2 BY PCR - Normal   RAPID STREP A - Normal            [facility]               Medical Decision Making  74-year-old female who comes in today complaining of sore throat postnasal drip generalized fatigue and headache that started today    Problems Addressed:  Viral pharyngitis: acute illness or injury  Viral syndrome: acute illness or injury    Amount and/or Complexity of Data Reviewed  Labs: ordered. Decision-making details documented in ED Course.     Details: Covid neg   Strep neg     Risk  OTC drugs.  Prescription drug management.  Risk Details: Clinical Impression: Viral upper respiratory infection      The differential diagnosis before testing included viral syndrome, Acute Sinusitis, pneumonia, which is a medical condition that poses a threat to life/function.             Disposition and Plan     Clinical Impression:  1. Viral syndrome    2. Viral pharyngitis         Disposition:  Discharge  8/3/2024  2:18 pm    Follow-up:  Mahi Valentino MD  [address] [place] [id number]  [phone]    Schedule an appointment as soon as possible for a visit   If symptoms worsen          Medications Prescribed:  Current Discharge Medication List

## 2024-08-03 NOTE — ED INITIAL ASSESSMENT (HPI)
C/o woke up this morning with sore throat, swollen tonsil, headache and left jaw got the \"crunch\"

## 2024-08-08 ENCOUNTER — OFFICE VISIT (OUTPATIENT)
Dept: INTERNAL MEDICINE CLINIC | Facility: CLINIC | Age: 74
End: 2024-08-08
Payer: COMMERCIAL

## 2024-08-08 ENCOUNTER — TELEPHONE (OUTPATIENT)
Dept: INTERNAL MEDICINE CLINIC | Facility: CLINIC | Age: 74
End: 2024-08-08

## 2024-08-08 ENCOUNTER — LAB ENCOUNTER (OUTPATIENT)
Dept: LAB | Age: 74
End: 2024-08-08
Attending: INTERNAL MEDICINE
Payer: COMMERCIAL

## 2024-08-08 VITALS
BODY MASS INDEX: 22.47 KG/M2 | SYSTOLIC BLOOD PRESSURE: 100 MMHG | WEIGHT: 119 LBS | DIASTOLIC BLOOD PRESSURE: 70 MMHG | RESPIRATION RATE: 18 BRPM | TEMPERATURE: 98 F | HEART RATE: 86 BPM | HEIGHT: 61 IN | OXYGEN SATURATION: 97 %

## 2024-08-08 DIAGNOSIS — K46.9 PERISTOMAL HERNIA: ICD-10-CM

## 2024-08-08 DIAGNOSIS — E11.9 DIET-CONTROLLED DIABETES MELLITUS (HCC): ICD-10-CM

## 2024-08-08 DIAGNOSIS — F41.9 ANXIETY AND DEPRESSION: ICD-10-CM

## 2024-08-08 DIAGNOSIS — R42 LIGHTHEADEDNESS: ICD-10-CM

## 2024-08-08 DIAGNOSIS — R42 LIGHTHEADEDNESS: Primary | ICD-10-CM

## 2024-08-08 DIAGNOSIS — F32.A ANXIETY AND DEPRESSION: ICD-10-CM

## 2024-08-08 LAB
ALBUMIN SERPL-MCNC: 4.2 G/DL (ref 3.2–4.8)
ALBUMIN/GLOB SERPL: 1.1 {RATIO} (ref 1–2)
ALP LIVER SERPL-CCNC: 96 U/L
ALT SERPL-CCNC: 11 U/L
ANION GAP SERPL CALC-SCNC: 5 MMOL/L (ref 0–18)
AST SERPL-CCNC: 21 U/L (ref ?–34)
BASOPHILS # BLD AUTO: 0.06 X10(3) UL (ref 0–0.2)
BASOPHILS NFR BLD AUTO: 0.8 %
BILIRUB SERPL-MCNC: 0.3 MG/DL (ref 0.2–1.1)
BUN BLD-MCNC: 25 MG/DL (ref 9–23)
CALCIUM BLD-MCNC: 9.6 MG/DL (ref 8.7–10.4)
CHLORIDE SERPL-SCNC: 103 MMOL/L (ref 98–112)
CO2 SERPL-SCNC: 24 MMOL/L (ref 21–32)
CREAT BLD-MCNC: 1.2 MG/DL
EGFRCR SERPLBLD CKD-EPI 2021: 48 ML/MIN/1.73M2 (ref 60–?)
EOSINOPHIL # BLD AUTO: 0.18 X10(3) UL (ref 0–0.7)
EOSINOPHIL NFR BLD AUTO: 2.3 %
ERYTHROCYTE [DISTWIDTH] IN BLOOD BY AUTOMATED COUNT: 13.1 %
EST. AVERAGE GLUCOSE BLD GHB EST-MCNC: 120 MG/DL (ref 68–126)
FASTING STATUS PATIENT QL REPORTED: NO
GLOBULIN PLAS-MCNC: 3.9 G/DL (ref 2–3.5)
GLUCOSE BLD-MCNC: 88 MG/DL (ref 70–99)
HBA1C MFR BLD: 5.8 % (ref ?–5.7)
HCT VFR BLD AUTO: 42.5 %
HGB BLD-MCNC: 13.7 G/DL
IMM GRANULOCYTES # BLD AUTO: 0.07 X10(3) UL (ref 0–1)
IMM GRANULOCYTES NFR BLD: 0.9 %
LYMPHOCYTES # BLD AUTO: 1.79 X10(3) UL (ref 1–4)
LYMPHOCYTES NFR BLD AUTO: 23.1 %
MCH RBC QN AUTO: 28.4 PG (ref 26–34)
MCHC RBC AUTO-ENTMCNC: 32.2 G/DL (ref 31–37)
MCV RBC AUTO: 88 FL
MONOCYTES # BLD AUTO: 0.64 X10(3) UL (ref 0.1–1)
MONOCYTES NFR BLD AUTO: 8.3 %
NEUTROPHILS # BLD AUTO: 5 X10 (3) UL (ref 1.5–7.7)
NEUTROPHILS # BLD AUTO: 5 X10(3) UL (ref 1.5–7.7)
NEUTROPHILS NFR BLD AUTO: 64.6 %
OSMOLALITY SERPL CALC.SUM OF ELEC: 278 MOSM/KG (ref 275–295)
PLATELET # BLD AUTO: 410 10(3)UL (ref 150–450)
POTASSIUM SERPL-SCNC: 4.2 MMOL/L (ref 3.5–5.1)
PROT SERPL-MCNC: 8.1 G/DL (ref 5.7–8.2)
RBC # BLD AUTO: 4.83 X10(6)UL
SODIUM SERPL-SCNC: 132 MMOL/L (ref 136–145)
WBC # BLD AUTO: 7.7 X10(3) UL (ref 4–11)

## 2024-08-08 PROCEDURE — 3074F SYST BP LT 130 MM HG: CPT | Performed by: INTERNAL MEDICINE

## 2024-08-08 PROCEDURE — 85025 COMPLETE CBC W/AUTO DIFF WBC: CPT | Performed by: INTERNAL MEDICINE

## 2024-08-08 PROCEDURE — G2211 COMPLEX E/M VISIT ADD ON: HCPCS | Performed by: INTERNAL MEDICINE

## 2024-08-08 PROCEDURE — 3008F BODY MASS INDEX DOCD: CPT | Performed by: INTERNAL MEDICINE

## 2024-08-08 PROCEDURE — 83036 HEMOGLOBIN GLYCOSYLATED A1C: CPT | Performed by: INTERNAL MEDICINE

## 2024-08-08 PROCEDURE — 99214 OFFICE O/P EST MOD 30 MIN: CPT | Performed by: INTERNAL MEDICINE

## 2024-08-08 PROCEDURE — 80053 COMPREHEN METABOLIC PANEL: CPT | Performed by: INTERNAL MEDICINE

## 2024-08-08 PROCEDURE — 3078F DIAST BP <80 MM HG: CPT | Performed by: INTERNAL MEDICINE

## 2024-08-08 RX ORDER — ESCITALOPRAM OXALATE 5 MG/1
5 TABLET ORAL DAILY
Qty: 30 TABLET | Refills: 1 | Status: SHIPPED | OUTPATIENT
Start: 2024-08-08

## 2024-08-08 NOTE — PROGRESS NOTES
Mae Mark is a 74 year old female.    Chief Complaint   Patient presents with    Abdominal Pain     Rm4, VS. When coughing, \"felt it move, like a jiggle\"     Sore Throat    Lightheadedness    Depression       HPI:     Patient here for follow up on UC visit, abdominal pain, depression, LH feeling. She went to  5 days ago for sore throat. She was tested for covid 19 and strep- both negative.  Sore throat resolving on its own. No f/c. She does feel LH for several weeks. BG are ok, between 90-100s (she is diet controlled diabetic) Lightheaded feeling seems to happen if she is standing for some time, She admits to poor appetite for 1+ month due to depression. She says she just has no motivation to do anything including eat. She is not working,  feeling bored all day. No SI/HI. Some anxiety present but mostly depressed at this time. I gave her lexapro last year but she never took it, open to starting it now.  Peristomal hernia    Patient Active Problem List   Diagnosis    Primary localized osteoarthrosis, hand    Arthritis    Crohn's disease (HCC)    Retroperitoneal lymphadenopathy    Elevated uric acid in blood    Asthma-COPD overlap syndrome (HCC)    B12 deficiency    Tinnitus of both ears    Dry eyes    Degenerative disc disease, lumbar    Swollen lymph nodes    Peristomal hernia    Acute kidney injury (HCC)    Azotemia    Hyperglycemia    Abdominal pain of unknown etiology    Hyponatremia    History of acute renal failure    Fungal infection of toenail    Odynophagia    Regional enteritis of small intestine with large intestine (HCC)    Elevated glucose    Renal insufficiency    Diet-controlled diabetes mellitus (HCC)    Fungal rash of torso    Right-sided chest wall pain    Bilateral leg edema    Scalp lesion    Hair loss     Current Outpatient Medications   Medication Sig Dispense Refill    escitalopram 5 MG Oral Tab Take 1 tablet (5 mg total) by mouth daily. 30 tablet 1    Glucose Blood (ACCU-CHEK GUIDE)  In Vitro Strip Use 1 (one) new strip daily for blood glucose monitoring 100 strip 3    hydroCHLOROthiazide 12.5 MG Oral Cap Take 1 capsule (12.5 mg total) by mouth daily. 30 capsule 0    Accu-Chek FastClix Lancets Does not apply Misc 1 Lancet by Finger stick route 2 (two) times daily.      ketoconazole 2 % External Cream Apply 1 Application topically 2 (two) times daily. APPLY TO AFFECTED AREA      potassium citrate-citric acid 1100-334 MG/5ML Oral Solution Take 15 mL (30 mEq total) by mouth daily with breakfast. 473 mL 11    clotrimazole-betamethasone 1-0.05 % External Cream Apply to affected area BID for 2 weeks 60 g 2    HYDROcodone-acetaminophen (NORCO) 5-325 MG Oral Tab Take 1 tablet by mouth every 6 (six) hours as needed for Pain. 30 tablet 0    ondansetron (ZOFRAN) 4 mg tablet Take 1 tablet (4 mg total) by mouth every 8 (eight) hours as needed for Nausea. 60 tablet 0    metRONIDAZOLE 0.75 % External Cream apply TO AFFECTED AREA ON face EVERY DAY BEFORE NOON      pimecrolimus 1 % External Cream apply topically TWICE DAILY TO affected AREAS      Sulfacetamide Sodium-Sulfur 10-5 % External Liquid Apply 1 Application topically 2 (two) times daily.      triamcinolone 0.1 % External Cream APPLY TO THE AFFECTED AREA(S) ON BACK TWICE DAILY FOR UP TO TWO WEEKS. AVOID face, armpits AND groin      albuterol (VENTOLIN HFA) 108 (90 Base) MCG/ACT Inhalation Aero Soln Inhale 2 puffs into the lungs every 6 (six) hours as needed for Wheezing. 1 each 3    Tiotropium Bromide Monohydrate (SPIRIVA RESPIMAT) 2.5 MCG/ACT Inhalation Aero Soln Inhale 2 puffs into the lungs daily. 1 each 3    acetaminophen 500 MG Oral Tab Take 1 tablet (500 mg total) by mouth every 6 (six) hours as needed for Pain.      glipiZIDE ER 2.5 MG Oral Tablet 24 Hr Take 1 tablet (2.5 mg total) by mouth daily with breakfast. 90 tablet 1    Accu-Chek FastClix Lancets Does not apply Misc 1 Lancet by Finger stick route 2 (two) times daily. Use as directed.  (Patient not taking: Reported on 6/17/2024) 204 each 3    PREDNISONE 5 MG Oral Tab TAKE EIGHT TABLETS BY MOUTH EVERY DAY with breakfast 240 tablet 1      Past Medical History:    Abdominal distention    Abdominal hernia    2021    Abdominal pain    years ago before ostomy    Anemia    occasional in the past    Anxiety    Arthritis    Asthma (HCC)    Atypical mole    Back pain    Bad breath    mouth hot/dry    Belching    Some liquids have begun causing belching. Sodas, sometimes water    Black stools    years ago    Bleeding nose    Bloating    Blood in the stool    years ago    Blood in urine    Blurred vision    Body piercing    Calculus of kidney    Have just been told I have kidney stones by [facility] Doctor    Change in hair    hair drier than usual    Chest pain on exertion    pressure    Chronic cough    Crohn's disease (HCC)    Diabetes mellitus (HCC)    Diarrhea, unspecified    Dizziness    still not steady    Easy bruising    Fatigue    Fever    Flatulence/gas pain/belching    No more than normal    Food intolerance    Frequent urination    Headache disorder    from deyhdration?    Hearing impairment    hearing aids    Hearing loss    Dr. Perea    Heartburn    Hemorrhoids    High cholesterol    Indigestion    have no gallbladder    Irregular bowel habits    chron's    Itch of skin    all over/not severe but bothersome when skin very dry. Scratch & is inflammed    Kidney failure    mild    Leg swelling    ankles-once in a while    Loss of appetite    Mouth sores    fever blisters inside lips/tongue    Nausea    since January 2018-occasional    Night sweats    Osteoarthritis    hands, knees    Pain in joints    Painful swallowing    not painful but uncomfortable    Painful urination    Prediabetes    Problems with swallowing    Began taking Terbinafine March 22, 2023 for a toenail fungal infection. Began noticing irritated throat/top of gut were burning and swallowing becoming uncomfortable. Also  experiencing weakness, dehydration, nausea, taste buds gone, loss of appetite    Rash    Shortness of breath    smoker    Skin blushing/flushing    years ago with active crohn's/before ileostomy    Sleep apnea    trouble sleeping since 2023    Sleep disturbance    Sputum production    Stool incontinence    years ago    Stress    typical on occasion    Uncomfortable fullness after meals    Visual impairment    glasses    Vomiting    Wears glasses    Weight gain    Weight loss    dieted in 2022. lost about 8 lbs. Regained about 4.  taste buds destroyed (temporarily?) due to reaction to Terbinafine. Lost about 20 lbs. between late April & .    Wheezing      Social History:  Social History     Socioeconomic History    Marital status:    Tobacco Use    Smoking status: Every Day     Current packs/day: 0.00     Average packs/day: 0.5 packs/day for 50.0 years (25.0 ttl pk-yrs)     Types: Cigarettes     Start date: 1973     Last attempt to quit: 2023     Years since quittin.0    Smokeless tobacco: Never    Tobacco comments:     tried Chantix three times-after about 2 months I became depressed and irritable.   Vaping Use    Vaping status: Never Used   Substance and Sexual Activity    Alcohol use: Not Currently     Comment: very rarely consume alcohol    Drug use: Not Currently     Types: Cannabis    Sexual activity: Not Currently   Social History Narrative    , no children.  Retired, previously worked in [employer].     Family History   Problem Relation Age of Onset    Diabetes Father         father    Cancer Father         lung, +smoker    Crohn's Disease Sister     Crohn's Disease Sister         Crohn's - her son also has Crohn's now, also my great niece has Crohn's    Diabetes Brother     Crohn's Disease Brother             Cancer Brother         lung cancer, +smoker    Breast Cancer Maternal Grandmother 80            Crohn's Disease Nephew      Crohn's Disease Other         Allergies  Allergies   Allergen Reactions    Mold Spores ASTHMA, ITCHING, Runny nose and WHEEZING         REVIEW OF SYSTEMS:   GENERAL HEALTH:  no fevers   RESPIRATORY: no cough  CARDIOVASCULAR: denies chest pain  GI: +hernia and discomfort lower abdominal when she coughs  : no dysuria  NEURO: denies headaches  PSYCH: +depression   HEME: No adenopathy      EXAM:   /70   Pulse 86   Temp 97.9 °F (36.6 °C)   Resp 18   Ht 5' 1\" (1.549 m)   Wt 119 lb (54 kg)   LMP  (LMP Unknown)   SpO2 97%   BMI 22.48 kg/m²   GENERAL: well developed, well nourished,in no apparent distress  HEENT: atraumatic, normocephalic, OP-clear  NECK: supple,no adenopathy  LUNGS: normal rate without respiratory distress, lungs clear to auscultation  CARDIO: RRR nl S1 S2  GI: normal bowel sounds, soft, colostomy RLQ, +peristomal hernia with minimal TTP, no R/G  EXTREMITIES: no cyanosis, clubbing or edema  NEURO: Alert and oriented, no focal deficits    ASSESSMENT AND PLAN:     Encounter Diagnoses   Name     Lightheadedness- likely related to lack of food intake, pt reports sad mood and loss of appetite. Will check CBC and CMP to rule out acute anemia or electrolyte abnormalities     Diet-controlled diabetes mellitus (HCC)- BG are controlled, monitor [id number]          Peristomal hernia- patient with lower abdominal pain when she coughs, referred to gen surg     Anxiety and depression- start lexapro 5mg daily, potential SE reviewed. Re assess mood in 6-8 weeks        Orders Placed This Encounter   Procedures    CBC W Differential W Platelet [E]    Comp Metabolic Panel (14)    Hemoglobin A1C [E]       Meds & Refills for this Visit:  Requested Prescriptions     Signed Prescriptions Disp Refills    escitalopram 5 MG Oral Tab 30 tablet 1     Sig: Take 1 tablet (5 mg total) by mouth daily.       Imaging & Consults:  SURGERY - INTERNAL    Return in about 2 months (around 10/8/2024), or if symptoms worsen or fail  to improve, for virtual visit on mood.  There are no Patient Instructions on file for this visit.      The patient indicates understanding of these issues and agrees to the plan.

## 2024-08-08 NOTE — TELEPHONE ENCOUNTER
Patient saw  today and she was given a referral for Dr.Beatrice Brewer. Patient stated that  is not accepting any new patients so she is asking for a different referral.

## 2024-08-14 ENCOUNTER — HOSPITAL ENCOUNTER (OUTPATIENT)
Dept: CT IMAGING | Facility: HOSPITAL | Age: 74
Discharge: HOME | End: 2024-08-14
Attending: UROLOGY
Payer: COMMERCIAL

## 2024-08-14 ENCOUNTER — OFFICE VISIT (OUTPATIENT)
Dept: SURGERY | Facility: CLINIC | Age: 74
End: 2024-08-14
Payer: COMMERCIAL

## 2024-08-14 ENCOUNTER — TELEPHONE (OUTPATIENT)
Dept: SURGERY | Facility: CLINIC | Age: 74
End: 2024-08-14

## 2024-08-14 DIAGNOSIS — N20.0 RECURRENT KIDNEY STONES: ICD-10-CM

## 2024-08-14 DIAGNOSIS — N20.0 RECURRENT KIDNEY STONES: Primary | ICD-10-CM

## 2024-08-14 DIAGNOSIS — R82.991 HYPOCITRATURIA: ICD-10-CM

## 2024-08-14 PROCEDURE — 74176 CT ABD & PELVIS W/O CONTRAST: CPT | Performed by: UROLOGY

## 2024-08-14 PROCEDURE — 99213 OFFICE O/P EST LOW 20 MIN: CPT | Performed by: UROLOGY

## 2024-08-14 RX ORDER — POTASSIUM CITRATE AND CITRIC ACID MONOHYDRATE 1100; 334 MG/5ML; MG/5ML
15 SOLUTION ORAL
Qty: 473 ML | Refills: 11 | Status: SHIPPED | OUTPATIENT
Start: 2024-08-14

## 2024-08-14 NOTE — TELEPHONE ENCOUNTER
Patient states she scheduled xray for 8/20 but she is asking why she is having this scan again. Please call.

## 2024-08-14 NOTE — TELEPHONE ENCOUNTER
Per patient requesting to speak to RN has questions regarding xray ordered. Please call thank you.

## 2024-08-16 ENCOUNTER — LAB ENCOUNTER (OUTPATIENT)
Dept: LAB | Age: 74
End: 2024-08-16
Attending: UROLOGY
Payer: COMMERCIAL

## 2024-08-16 ENCOUNTER — PATIENT MESSAGE (OUTPATIENT)
Dept: SURGERY | Facility: CLINIC | Age: 74
End: 2024-08-16

## 2024-08-16 DIAGNOSIS — N20.0 RECURRENT KIDNEY STONES: ICD-10-CM

## 2024-08-16 PROCEDURE — 84300 ASSAY OF URINE SODIUM: CPT

## 2024-08-16 PROCEDURE — 83986 ASSAY PH BODY FLUID NOS: CPT

## 2024-08-16 PROCEDURE — 82436 ASSAY OF URINE CHLORIDE: CPT

## 2024-08-16 PROCEDURE — 81050 URINALYSIS VOLUME MEASURE: CPT

## 2024-08-16 PROCEDURE — 84105 ASSAY OF URINE PHOSPHORUS: CPT

## 2024-08-16 PROCEDURE — 84560 ASSAY OF URINE/URIC ACID: CPT

## 2024-08-16 PROCEDURE — 83735 ASSAY OF MAGNESIUM: CPT

## 2024-08-16 PROCEDURE — 83945 ASSAY OF OXALATE: CPT

## 2024-08-16 PROCEDURE — 84133 ASSAY OF URINE POTASSIUM: CPT

## 2024-08-16 PROCEDURE — 84392 ASSAY OF URINE SULFATE: CPT

## 2024-08-16 PROCEDURE — 82340 ASSAY OF CALCIUM IN URINE: CPT

## 2024-08-16 PROCEDURE — 82507 ASSAY OF CITRATE: CPT

## 2024-08-20 ENCOUNTER — TELEPHONE (OUTPATIENT)
Dept: SURGERY | Facility: CLINIC | Age: 74
End: 2024-08-20

## 2024-08-20 ENCOUNTER — HOSPITAL ENCOUNTER (OUTPATIENT)
Dept: GENERAL RADIOLOGY | Age: 74
Discharge: HOME | End: 2024-08-20
Attending: UROLOGY
Payer: COMMERCIAL

## 2024-08-20 ENCOUNTER — PATIENT MESSAGE (OUTPATIENT)
Dept: INTERNAL MEDICINE CLINIC | Facility: CLINIC | Age: 74
End: 2024-08-20

## 2024-08-20 DIAGNOSIS — N20.0 RECURRENT KIDNEY STONES: ICD-10-CM

## 2024-08-20 PROCEDURE — 74018 RADEX ABDOMEN 1 VIEW: CPT | Performed by: UROLOGY

## 2024-08-20 NOTE — TELEPHONE ENCOUNTER
From: Mae Mark  To: Joao Marie  Sent: 8/16/2024 10:16 AM CDT  Subject: Mae Mark-[id number]    [facility]. Your office gave me a list of drop-off locations for 24 hr. Urine Collection. I took to lab at [address]. They almost didn’t take it. Told me that I should take to [facility] location. She then made a phone call and they agreed to accept. Was I given incorrect info by your office? Please call me or send text for correct future reference.  Thanks  Kasey Mark

## 2024-08-20 NOTE — TELEPHONE ENCOUNTER
Please call patient and see if she is available for a video visit with me sometime this week or next week.  It is not urgent and I would just like to go over the x-ray she just had.  If she would prefer to come into the office to talk to me that is fine too.    Thanks,  MPH    Below if for Documentation Purposes Only:  KUB 8/20/24: 4 RMP  CT SP 8/14/24: 4-5 RMP, 1,1 RLP. Stable L renal stones.   CT AP 11/24/23: 7 proximal right ureteral, 4 RMP. 74 x 78 mm left renal cyst  CT SP 7/15/23: 4 RMP, 8 RLP    I reviewed the 2 prior CT scans and it is possible that the 4 RMP stone is actually intraparenchymal.  I would probably opt to observe for now and check another CT scan in 6 months to ensure stability.  We could also potentially check a CT urogram to see if this gives us more information.  Will discuss at NOV.

## 2024-08-21 NOTE — TELEPHONE ENCOUNTER
From: Mae Mark  To: Mahi Valentino  Sent: 8/20/2024 6:59 AM CDT  Subject: Mae Mark [id number]    I have decided to continue Escitalopram until I hear from your office.

## 2024-08-22 ENCOUNTER — TELEPHONE (OUTPATIENT)
Dept: INTERNAL MEDICINE CLINIC | Facility: CLINIC | Age: 74
End: 2024-08-22

## 2024-08-22 ENCOUNTER — TELEPHONE (OUTPATIENT)
Dept: SURGERY | Facility: CLINIC | Age: 74
End: 2024-08-22

## 2024-08-22 NOTE — TELEPHONE ENCOUNTER
Patient calling back to schedule video visit to discuss xray and surgery. See 8/20 closed telephone encounter. Please call.

## 2024-08-22 NOTE — TELEPHONE ENCOUNTER
Patient states she just received a call that the appointment she scheduled online for today is being cancelled and needs to be rescheduled d/t not enough time. Patient reports symptoms of arthritis in bilateral hand, pinched nerve on left side of neck. She states this is the second time an appointment was cancelled same day. Recommended to patient if she has symptoms to call office and ask to speak to a nurse rather than self scheduling online. Patient confirms understanding. Appointment scheduled on Monday with mid-level provider.

## 2024-08-23 ENCOUNTER — TELEMEDICINE (OUTPATIENT)
Dept: SURGERY | Facility: CLINIC | Age: 74
End: 2024-08-23

## 2024-08-23 ENCOUNTER — TELEPHONE (OUTPATIENT)
Dept: SURGERY | Facility: CLINIC | Age: 74
End: 2024-08-23

## 2024-08-23 DIAGNOSIS — R82.991 HYPOCITRATURIA: ICD-10-CM

## 2024-08-23 DIAGNOSIS — R31.21 ASYMPTOMATIC MICROSCOPIC HEMATURIA: Primary | ICD-10-CM

## 2024-08-23 LAB
AMMONIA, URINE: 23 MMOL/24 HR
CHLORIDE URINE: 45 MMOL/24 HR
CITRIC ACID(CITRATE): 80 MG/24 HR
CREATININE, URINE: 800 MG/24 HR
CREATININE/KG BODY WEIGHT, URINE: 14.7 MG/24 HR/KG
CREATININE/KG BODY WEIGHT, URINE: 14.7 MG/24 HR/KG
LC CALCIUM OXALATE SATURATION, URINE: 1.05
LC CALCIUM PHOSPHATE SATURATION, URINE: 0.04
LC CALCIUM, URINE: 15 MG/24 HR
LC CALCIUM/CREATININE RATIO, URINE: 18 MG/G CREAT
LC CALCIUM/KG BODY WEIGHT, URINE: 0.3 MG/24 HR/KG
MAGNESIUM, URINE: 16 MG/24 HR
OXALATES, URINE: 18 MG/24 HR
PH, 24 HR URINE: 5.56
PHOSPHORUS, URINE: 530 MG/24 HR
POTASSIUM, URINE: 69 MMOL/24 HR
PROTEIN CATABOLIC RATE, URINE: 0.9 G/KG/24 HR
PROTEIN CATABOLIC RATE, URINE: 0.9 G/KG/24 HR
SODIUM, URINE: <14 MMOL/24 HR
SULFATE, URINE: 16 MEQ/24 HR
UREA NITROGEN, URINE: 6.21 G/24 HR
UREA NITROGEN, URINE: 6.21 G/24 HR
URIC ACID SATURATION, URINE: 1.18
URIC ACID SATURATION, URINE: 1.18
URIC ACID, URINE: 388 MG/24 HR
URINE VOLUME (PRESERVATIVE): 1420 ML/24 HR

## 2024-08-23 PROCEDURE — 99214 OFFICE O/P EST MOD 30 MIN: CPT | Performed by: UROLOGY

## 2024-08-23 RX ORDER — POTASSIUM CITRATE AND CITRIC ACID MONOHYDRATE 1100; 334 MG/5ML; MG/5ML
15 SOLUTION ORAL
Qty: 473 ML | Refills: 11 | Status: SHIPPED | OUTPATIENT
Start: 2024-08-23

## 2024-08-23 NOTE — PROGRESS NOTES
Virtual Video Check-In    Mae Mark verbally consents to a Virtual Video Check-In service today.  This is a telemedicine visit with live, interactive video and audio.   Patient understands and accepts financial responsibility for any deductible, co-insurance and/or co-pays associated with this service.    Duration of the service including review of pertinent imaging/labs and coordination of care: 36 minutes    Summary of topics discussed:  See below    HPI:     Mae Mark is a 74 year old female with a PMH of anxiety, HL, DM, PERLA, Crohn's s/p total colectomy + ileostomy (1979), partial SBR, OA.  Following for:  1. Recurrent CaOx kidney stones  - right ureteral stone incidentally noted on imaging. S/p right URS 2/8/24. Known left renal stones.  - no prior episodes  2. Severe hypocitraturia + low UOP  - 24 h urine 8/16/24: low UOP, citrate, pH (1420, 80, 5.564)  - 30 urocit solution 2/14/24  - 24 h urine 2/25/24: very low UOP, citrate (1175, < 23)  3. [facility]     PCP - Chicho  LOV 2/28/24    Presents for check-up, review 24 h urine, KUB and prior imaging, discuss stone prevention.  On infusions now for Crohn's flares and feels weak most days.  No flank pain, hematuria, dysuria.  She is taking 30 mEq urocit solution daily as she couldn't swallow the pills and has slightly increased water intake.     UTI hx: none  Gross hematuria: episode ~ 30 y ago, nothing since  Tobacco hx: ~ 80 pack years, currently 1/2 PPD  Fam h/o  malignancy: none    UA is negative    Reported ~ 20-30 oz water, 12 coffee with medium to light yellow urine. Drinking about the same amount. I encouraged the pt drink at least 40-60 oz water per day or enough to keep urine clear to pale yellow for stone prevention.    KUB 8/20/24: 4 RMP, 2 LLP  CT SP 8/14/24: 4-5 RMP, 1,1 RLP. Stable L renal stones.   CT AP 11/24/23: 7 proximal right ureteral, 4 RMP. 74 x 78 mm left renal cyst, 2 LLP  CT SP 7/15/23: 4 RMP, 8 RLP    Discussed options  for recurrent stones and will recheck 24 h urine.     Discussed that as she is taking 30 mEq urocit solution the needs to at least double or triple    I reviewed the 2 prior CT scans with her today and it is possible that the 4 RMP stone is actually intraparenchymal.  I would probably opt to observe for now and check another CT scan in 6 months to ensure stability.  We could also potentially check a CT urogram to see if this gives us more information and she wants to do this.    She will double water intake, increase urocit solution to TID for hypocitraturia. F/u in 6 mo with CTU prior.    Prior note:  Phone visit once KUB results are back. She is open to what I recommend for the RMP stone but OK treating it with either ESWL or URS.    Prior note:  If she has passed stone I would still recommend cystoscopy at some point given AMH in the past.    She will significantly increase water intake for stone prevention, checking CT SP and OR as noted above. She requests Nephrology referral for CKD. Can consider 24 h urine later but will discuss this at NOV.    HISTORY:  Past Medical History:    Abdominal distention    Abdominal hernia    2021    Abdominal pain    years ago before ostomy    Anemia    occasional in the past    Anxiety    Arthritis    Asthma (HCC)    Atypical mole    Back pain    Bad breath    mouth hot/dry    Belching    Some liquids have begun causing belching. Sodas, sometimes water    Black stools    years ago    Bleeding nose    Bloating    Blood in the stool    years ago    Blood in urine    Blurred vision    Body piercing    Calculus of kidney    Have just been told I have kidney stones by [facility] Doctor    Change in hair    hair drier than usual    Chest pain on exertion    pressure    Chronic cough    Crohn's disease (HCC)    Diabetes mellitus (HCC)    Diarrhea, unspecified    Dizziness    still not steady    Easy bruising    Fatigue    Fever    Flatulence/gas pain/belching    No more than  normal    Food intolerance    Frequent urination    Headache disorder    from deyhdration?    Hearing impairment    hearing aids    Hearing loss    Dr. Perea    Heartburn    Hemorrhoids    High cholesterol    Indigestion    have no gallbladder    Irregular bowel habits    chron's    Itch of skin    all over/not severe but bothersome when skin very dry. Scratch & is inflammed    Kidney failure    mild    Leg swelling    ankles-once in a while    Loss of appetite    Mouth sores    fever blisters inside lips/tongue    Nausea    since January 2018-occasional    Night sweats    Osteoarthritis    hands, knees    Pain in joints    Painful swallowing    not painful but uncomfortable    Painful urination    Prediabetes    Problems with swallowing    Began taking Terbinafine March 22, 2023 for a toenail fungal infection. Began noticing irritated throat/top of gut were burning and swallowing becoming uncomfortable. Also experiencing weakness, dehydration, nausea, taste buds gone, loss of appetite    Rash    Shortness of breath    smoker    Skin blushing/flushing    years ago with active crohn's/before ileostomy    Sleep apnea    trouble sleeping since June 2023    Sleep disturbance    Sputum production    Stool incontinence    years ago    Stress    typical on occasion    Uncomfortable fullness after meals    Visual impairment    glasses    Vomiting    Wears glasses    Weight gain    Weight loss    dieted in Sept. 2022. lost about 8 lbs. Regained about 4. April, 2023 taste buds destroyed (temporarily?) due to reaction to Terbinafine. Lost about 20 lbs. between late April & September, 2023.    Wheezing      Past Surgical History:   Procedure Laterality Date    Appendectomy      removed during ileostomy?    Bowel resection      Cataract Left 09/2018    Cataract Right 10/2018    Cholecystectomy      Colectomy      Colonoscopy  ?    over 10 years    Correct bunion,simple      Hand/finger surgery unlisted Right     trigger thumb  release    Hand/finger surgery unlisted Left 2014    A1 Pulley release    Part removal colon w end colostomy      colon resection w/ileostomy    Revision of ileostomy,simple        Family History   Problem Relation Age of Onset    Diabetes Father         father    Cancer Father         lung, +smoker    Crohn's Disease Sister     Crohn's Disease Sister         Crohn's - her son also has Crohn's now, also my great niece has Crohn's    Diabetes Brother     Crohn's Disease Brother             Cancer Brother         lung cancer, +smoker    Breast Cancer Maternal Grandmother 80            Crohn's Disease Nephew     Crohn's Disease Other       Social History:   Social History     Socioeconomic History    Marital status:    Tobacco Use    Smoking status: Every Day     Current packs/day: 0.00     Average packs/day: 0.5 packs/day for 50.0 years (25.0 ttl pk-yrs)     Types: Cigarettes     Start date: 1973     Last attempt to quit: 2023     Years since quittin.1    Smokeless tobacco: Never    Tobacco comments:     tried Chantix three times-after about 2 months I became depressed and irritable.   Vaping Use    Vaping status: Never Used   Substance and Sexual Activity    Alcohol use: Not Currently     Comment: very rarely consume alcohol    Drug use: Not Currently     Types: Cannabis    Sexual activity: Not Currently   Social History Narrative    , no children.  Retired, previously worked in administration.        Medications (Active prior to today's visit):  Current Outpatient Medications   Medication Sig Dispense Refill    potassium citrate-citric acid 1100-334 MG/5ML Oral Solution Take 15 mL (30 mEq total) by mouth 3 (three) times daily with meals. 473 mL 11    escitalopram 5 MG Oral Tab Take 1 tablet (5 mg total) by mouth daily. 30 tablet 1    Glucose Blood (ACCU-CHEK GUIDE) In Vitro Strip Use 1 (one) new strip daily for blood glucose monitoring 100 strip 3     hydroCHLOROthiazide 12.5 MG Oral Cap Take 1 capsule (12.5 mg total) by mouth daily. 30 capsule 0    Accu-Chek FastClix Lancets Does not apply Misc 1 Lancet by Finger stick route 2 (two) times daily.      ketoconazole 2 % External Cream Apply 1 Application topically 2 (two) times daily. APPLY TO AFFECTED AREA      clotrimazole-betamethasone 1-0.05 % External Cream Apply to affected area BID for 2 weeks 60 g 2    HYDROcodone-acetaminophen (NORCO) 5-325 MG Oral Tab Take 1 tablet by mouth every 6 (six) hours as needed for Pain. 30 tablet 0    ondansetron (ZOFRAN) 4 mg tablet Take 1 tablet (4 mg total) by mouth every 8 (eight) hours as needed for Nausea. 60 tablet 0    metRONIDAZOLE 0.75 % External Cream apply TO AFFECTED AREA ON face EVERY DAY BEFORE NOON      pimecrolimus 1 % External Cream apply topically TWICE DAILY TO affected AREAS      Sulfacetamide Sodium-Sulfur 10-5 % External Liquid Apply 1 Application topically 2 (two) times daily.      triamcinolone 0.1 % External Cream APPLY TO THE AFFECTED AREA(S) ON BACK TWICE DAILY FOR UP TO TWO WEEKS. AVOID face, armpits AND groin      albuterol (VENTOLIN HFA) 108 (90 Base) MCG/ACT Inhalation Aero Soln Inhale 2 puffs into the lungs every 6 (six) hours as needed for Wheezing. 1 each 3    Tiotropium Bromide Monohydrate (SPIRIVA RESPIMAT) 2.5 MCG/ACT Inhalation Aero Soln Inhale 2 puffs into the lungs daily. 1 each 3    acetaminophen 500 MG Oral Tab Take 1 tablet (500 mg total) by mouth every 6 (six) hours as needed for Pain.         Allergies:  Allergies   Allergen Reactions    Mold Spores ASTHMA, ITCHING, Runny nose and WHEEZING         ROS:     A comprehensive 10 point review of systems was completed.  Pertinent positives and negatives noted in the the HPI.    PHYSICAL EXAM:     GENERAL APPEARANCE: well, developed, well nourished, in no acute distress  NEUROLOGIC: nonfocal, alert and oriented  HEAD: normocephalic, atraumatic  EYES: sclera non-icteric  EARS: hearing  intact  ORAL CAVITY: mucosa moist  NECK/THYROID: no obvious goiter or masses  LUNGS: nonlabored breathing  ABDOMEN: soft, no obvious masses or tenderness  SKIN: no obvious rashes    : as noted above     ASSESSMENT/PLAN:   Diagnoses and all orders for this visit:    Asymptomatic microscopic hematuria  -     CT UROGRAM(W+WO) W/3D(CPT=74178/46633); Future    Hypocitraturia  -     potassium citrate-citric acid 1100-334 MG/5ML Oral Solution; Take 15 mL (30 mEq total) by mouth 3 (three) times daily with meals.        - as noted above.    Thanks again for this consult.    Joao Marie MD, FACS  Urologist  [facility]  Office: [phone]

## 2024-08-23 NOTE — TELEPHONE ENCOUNTER
Patient not meeting homebound criteria.  He will follow up in clinic regarding wound cares.     Please call this patient or their family and schedule the patient for a follow up with me in 6 mo with CT prior.    Thanks,    MPH

## 2024-08-26 ENCOUNTER — OFFICE VISIT (OUTPATIENT)
Dept: INTERNAL MEDICINE CLINIC | Facility: CLINIC | Age: 74
End: 2024-08-26
Payer: COMMERCIAL

## 2024-08-26 ENCOUNTER — HOSPITAL ENCOUNTER (OUTPATIENT)
Dept: GENERAL RADIOLOGY | Age: 74
Discharge: HOME | End: 2024-08-26
Payer: COMMERCIAL

## 2024-08-26 VITALS
WEIGHT: 125 LBS | OXYGEN SATURATION: 99 % | TEMPERATURE: 97 F | HEIGHT: 61 IN | SYSTOLIC BLOOD PRESSURE: 110 MMHG | BODY MASS INDEX: 23.6 KG/M2 | DIASTOLIC BLOOD PRESSURE: 60 MMHG | HEART RATE: 61 BPM

## 2024-08-26 DIAGNOSIS — M54.2 NECK PAIN: Primary | ICD-10-CM

## 2024-08-26 DIAGNOSIS — M65.311 TRIGGER FINGER OF RIGHT THUMB: ICD-10-CM

## 2024-08-26 DIAGNOSIS — M54.12 CERVICAL RADICULOPATHY: ICD-10-CM

## 2024-08-26 DIAGNOSIS — M26.69 TMJ INFLAMMATION: ICD-10-CM

## 2024-08-26 DIAGNOSIS — R52 RADIATING PAIN: ICD-10-CM

## 2024-08-26 DIAGNOSIS — M54.2 NECK PAIN: ICD-10-CM

## 2024-08-26 PROCEDURE — 72040 X-RAY EXAM NECK SPINE 2-3 VW: CPT

## 2024-08-26 RX ORDER — CEPHALEXIN 500 MG/1
CAPSULE ORAL
COMMUNITY
Start: 2024-08-22

## 2024-08-26 RX ORDER — METHYLPREDNISOLONE 4 MG
TABLET, DOSE PACK ORAL
Qty: 1 EACH | Refills: 0 | Status: SHIPPED | OUTPATIENT
Start: 2024-08-26

## 2024-08-26 RX ORDER — CYCLOBENZAPRINE HCL 5 MG
5 TABLET ORAL 2 TIMES DAILY PRN
COMMUNITY
Start: 2024-06-07

## 2024-08-26 NOTE — PROGRESS NOTES
Mae Mark is a 74 year old female.   Chief Complaint   Patient presents with    Arthritis    Nerves     RM 14 ss. Neck nerve pain radiating down to hands, mostly on left side for 9-10 days.      HPI:    Here for neck and shoulder pain for the last 9-10 days. Reports pain starting at neck radiating down bilateral shoulders in to hands and fingers. Symptoms improved with movement or standing. Symptoms worse when sitting. Has tried icy hot patches, tylenol and ibuprofen. She states she tries to avoid NSAIDS due to renal insufficiency but was not getting relief from tylenol. No recent falls or injuries. No chest pain, shortness of breath, n/v/d. Does report history of degenerative disc disease in lumbar spine.    Patient also looking for recommendation for hand specialist for trigger finger in thumbs bilaterally. Seen by specialist a few years ago but bothering her more often last few months.     Allergies:  Allergies   Allergen Reactions    Mold Spores ASTHMA, ITCHING, Runny nose and WHEEZING      Current Meds:  Current Outpatient Medications   Medication Sig Dispense Refill    cephalexin 500 MG Oral Cap TAKE ALL FOUR capsules 30-60 MINUTES prior TO THE procedure      cyclobenzaprine 5 MG Oral Tab Take 1 tablet (5 mg total) by mouth 2 (two) times daily as needed for Muscle spasms.      potassium citrate-citric acid 1100-334 MG/5ML Oral Solution Take 15 mL (30 mEq total) by mouth 3 (three) times daily with meals. 473 mL 11    escitalopram 5 MG Oral Tab Take 1 tablet (5 mg total) by mouth daily. 30 tablet 1    Glucose Blood (ACCU-CHEK GUIDE) In Vitro Strip Use 1 (one) new strip daily for blood glucose monitoring 100 strip 3    hydroCHLOROthiazide 12.5 MG Oral Cap Take 1 capsule (12.5 mg total) by mouth daily. 30 capsule 0    Accu-Chek FastClix Lancets Does not apply Misc 1 Lancet by Finger stick route 2 (two) times daily.      ketoconazole 2 % External Cream Apply 1 Application topically 2 (two) times daily.  APPLY TO AFFECTED AREA      clotrimazole-betamethasone 1-0.05 % External Cream Apply to affected area BID for 2 weeks 60 g 2    HYDROcodone-acetaminophen (NORCO) 5-325 MG Oral Tab Take 1 tablet by mouth every 6 (six) hours as needed for Pain. 30 tablet 0    ondansetron (ZOFRAN) 4 mg tablet Take 1 tablet (4 mg total) by mouth every 8 (eight) hours as needed for Nausea. 60 tablet 0    metRONIDAZOLE 0.75 % External Cream apply TO AFFECTED AREA ON face EVERY DAY BEFORE NOON      pimecrolimus 1 % External Cream apply topically TWICE DAILY TO affected AREAS      Sulfacetamide Sodium-Sulfur 10-5 % External Liquid Apply 1 Application topically 2 (two) times daily.      triamcinolone 0.1 % External Cream APPLY TO THE AFFECTED AREA(S) ON BACK TWICE DAILY FOR UP TO TWO WEEKS. AVOID face, armpits AND groin      albuterol (VENTOLIN HFA) 108 (90 Base) MCG/ACT Inhalation Aero Soln Inhale 2 puffs into the lungs every 6 (six) hours as needed for Wheezing. 1 each 3    Tiotropium Bromide Monohydrate (SPIRIVA RESPIMAT) 2.5 MCG/ACT Inhalation Aero Soln Inhale 2 puffs into the lungs daily. 1 each 3    acetaminophen 500 MG Oral Tab Take 1 tablet (500 mg total) by mouth every 6 (six) hours as needed for Pain.          PMH:     Past Medical History:    Abdominal distention    Abdominal hernia    2021    Abdominal pain    years ago before ostomy    Anemia    occasional in the past    Anxiety    Arthritis    Asthma (HCC)    Atypical mole    Back pain    Bad breath    mouth hot/dry    Belching    Some liquids have begun causing belching. Sodas, sometimes water    Black stools    years ago    Bleeding nose    Bloating    Blood in the stool    years ago    Blood in urine    Blurred vision    Body piercing    Calculus of kidney    Have just been told I have kidney stones by [facility] Doctor    Change in hair    hair drier than usual    Chest pain on exertion    pressure    Chronic cough    Crohn's disease (HCC)    Diabetes mellitus (HCC)     Diarrhea, unspecified    Dizziness    still not steady    Easy bruising    Fatigue    Fever    Flatulence/gas pain/belching    No more than normal    Food intolerance    Frequent urination    Headache disorder    from deyhdration?    Hearing impairment    hearing aids    Hearing loss    Dr. Perea    Heartburn    Hemorrhoids    High cholesterol    Indigestion    have no gallbladder    Irregular bowel habits    chron's    Itch of skin    all over/not severe but bothersome when skin very dry. Scratch & is inflammed    Kidney failure    mild    Leg swelling    ankles-once in a while    Loss of appetite    Mouth sores    fever blisters inside lips/tongue    Nausea    since January 2018-occasional    Night sweats    Osteoarthritis    hands, knees    Pain in joints    Painful swallowing    not painful but uncomfortable    Painful urination    Prediabetes    Problems with swallowing    Began taking Terbinafine March 22, 2023 for a toenail fungal infection. Began noticing irritated throat/top of gut were burning and swallowing becoming uncomfortable. Also experiencing weakness, dehydration, nausea, taste buds gone, loss of appetite    Rash    Shortness of breath    smoker    Skin blushing/flushing    years ago with active crohn's/before ileostomy    Sleep apnea    trouble sleeping since June 2023    Sleep disturbance    Sputum production    Stool incontinence    years ago    Stress    typical on occasion    Uncomfortable fullness after meals    Visual impairment    glasses    Vomiting    Wears glasses    Weight gain    Weight loss    dieted in Sept. 2022. lost about 8 lbs. Regained about 4. April, 2023 taste buds destroyed (temporarily?) due to reaction to Terbinafine. Lost about 20 lbs. between late April & September, 2023.    Wheezing       ROS:   Review of Systems   See HPI    PHYSICAL EXAM:    /60   Pulse 61   Temp 96.7 °F (35.9 °C) (Temporal)   Ht 5' 1\" (1.549 m)   Wt 125 lb (56.7 kg)   LMP  (LMP  Unknown)   SpO2 99%   BMI 23.62 kg/m²   Physical Exam  Constitutional:       Appearance: Normal appearance.   Cardiovascular:      Rate and Rhythm: Normal rate.   Pulmonary:      Effort: Pulmonary effort is normal.   Musculoskeletal:      Cervical back: Muscular tenderness present.   Skin:     General: Skin is warm and dry.   Neurological:      General: No focal deficit present.      Mental Status: She is alert.          ASSESSMENT/ PLAN:   1. Neck pain  Tylenol PRN, ice, avoid triggering activities. Check imaging  - XR CERVICAL SPINE (2-3 VIEWS) (CPT=72040); Future  - methylPREDNISolone (MEDROL) 4 MG Oral Tablet Therapy Pack; As directed.  Dispense: 1 each; Refill: 0  - Physical Therapy Referral - [facility]    2. TMJ inflammation  Following with dentist will discuss coverage with oral surgeon   - Oral Surgery Referral - In Network    3. Radiating pain  - Oral Surgery Referral - In Network    4. Trigger finger of right thumb  Discussed follow up with Dr. Zarate with [facility]  - Ortho Referral - In Network    5. Cervical radiculopathy  Complete medrol, start physical therapy. If symptoms fail to improve recommend consult with physiatry   - methylPREDNISolone (MEDROL) 4 MG Oral Tablet Therapy Pack; As directed.  Dispense: 1 each; Refill: 0  - Physical Therapy Referral - [facility]      Health Maintenance Due   Topic Date Due    Tobacco Cessation Counseling  Never done    COVID-19 Vaccine (8 - 2023-24 season) 02/15/2024    HTN: BP Follow-Up  09/12/2024    Annual Physical  11/20/2024           Pt indicates understanding and agrees to the plan.     No follow-ups on file.    PARESH Dominguez

## 2024-08-27 ENCOUNTER — PATIENT MESSAGE (OUTPATIENT)
Dept: INTERNAL MEDICINE CLINIC | Facility: CLINIC | Age: 74
End: 2024-08-27

## 2024-08-27 DIAGNOSIS — M54.12 CERVICAL RADICULOPATHY: Primary | ICD-10-CM

## 2024-08-27 NOTE — TELEPHONE ENCOUNTER
From: Mae Mark  To: Edie Childress  Sent: 8/27/2024 2:47 PM CDT  Subject: Mae Mark 06-    Cristofer Irizarry  While I'm thinking of it....we're hoping to drive to [place] in late September of this year to see family and I'm wondering if you can prescribe Methylprednisolone for me a few days before? I know this is a chronic condition and want to have a option if (after sitting in car for about 912 miles each way). Your feedback is appreciated.  Kasey Mark

## 2024-08-28 RX ORDER — METHYLPREDNISOLONE 4 MG
TABLET, DOSE PACK ORAL
Qty: 21 TABLET | Refills: 0 | Status: SHIPPED | OUTPATIENT
Start: 2024-08-28

## 2024-08-29 ENCOUNTER — PATIENT MESSAGE (OUTPATIENT)
Dept: SURGERY | Facility: CLINIC | Age: 74
End: 2024-08-29

## 2024-08-30 NOTE — TELEPHONE ENCOUNTER
From: Mae Mark  To: Joao Marie  Sent: 8/29/2024 5:06 PM CDT  Subject: Mae S Deedee 06-    [facility].  I'm having a difficult time scheduling scan for February, 2025. Sorry if I've misunderstood but I thought I was to schedule a follow-up scan for 6 months in future. I just spoke with central scheduling and rep told me she sees a different scan which should be completed on or around 8-23-24. I'm confused. I sent your office a message a few days ago but haven't received call/test re: also setting up follow-up office visit for after this follow-up scan.     Your reply is surely appreciated.  Kasey Mark

## 2024-08-30 NOTE — TELEPHONE ENCOUNTER
Have patient check with her insurance on oral surgeons in network and I would recommend one that is most convenient for her.

## 2024-08-30 NOTE — TELEPHONE ENCOUNTER
From: Mae Mark  To: Edie Childress  Sent: 8/27/2024 2:38 PM CDT  Subject: Mae BENAVIDES Deedee 06-    Raisa Irizarry.    I called to make appt. with Dr. Gala Mccray and was told by his office that his [place] location/s are not in network.  She also stated that your office has been notified of this several times but for some reason it still shows in network with my [employer] [facility] [employer]. I told her that I would mention to you. Also, I assumed you work directly with Dr. Valentino so I sent this same message to their office but was answered saying their office didn't refer him. That said, is there any chance you can refer me to another [facility] doctor? I truly appreciate. If not, no worries. I will begin a search after hearing from you.  Thanks again.  Kasey Mark

## 2024-09-05 ENCOUNTER — PATIENT MESSAGE (OUTPATIENT)
Dept: INTERNAL MEDICINE CLINIC | Facility: CLINIC | Age: 74
End: 2024-09-05

## 2024-09-09 ENCOUNTER — PATIENT MESSAGE (OUTPATIENT)
Dept: INTERNAL MEDICINE CLINIC | Facility: CLINIC | Age: 74
End: 2024-09-09

## 2024-09-09 NOTE — TELEPHONE ENCOUNTER
From: Mae Mark  To: Mahi Valentino  Sent: 9/5/2024 3:51 PM CDT  Subject: Mae Mark [id number]    Hello.  Will you please clarify whether or not I need to have more blood drawn around September 17, 2024?     Thank you.  Kasey

## 2024-09-10 NOTE — TELEPHONE ENCOUNTER
From: Mae Mark  To: Edie Childress  Sent: 9/9/2024 10:44 AM CDT  Subject: Mae Mark 06-    Hello.  I'd like to get information on how to schedule appt. with [facility] asap.    Thank you

## 2024-09-16 ENCOUNTER — TELEPHONE (OUTPATIENT)
Dept: INTERNAL MEDICINE CLINIC | Facility: CLINIC | Age: 74
End: 2024-09-16

## 2024-09-16 NOTE — TELEPHONE ENCOUNTER
Patient called stating she is not doing very well-losing weight, crohns issue,     She is confused as to when she is due back and what labs she needs to get and when??  Please call her back and let her know.

## 2024-09-16 NOTE — TELEPHONE ENCOUNTER
Left message that she is due back in early October, Dr. Valentino started her on lexapro and wants to follow up.    Patient advised to get the TSH done which was ordered at the last visit due to reports of hair loss.

## 2024-09-20 ENCOUNTER — LAB ENCOUNTER (OUTPATIENT)
Dept: LAB | Age: 74
End: 2024-09-20
Payer: COMMERCIAL

## 2024-09-20 DIAGNOSIS — R63.4 WEIGHT LOSS: ICD-10-CM

## 2024-09-20 DIAGNOSIS — K52.9 ENTERITIS: ICD-10-CM

## 2024-09-20 DIAGNOSIS — K50.019 CROHN'S DISEASE OF SMALL INTESTINE WITH COMPLICATION (HCC): ICD-10-CM

## 2024-09-20 DIAGNOSIS — K12.1 MOUTH ULCERS: ICD-10-CM

## 2024-09-20 DIAGNOSIS — R19.5 ELEVATED FECAL CALPROTECTIN: ICD-10-CM

## 2024-09-20 LAB — CRP SERPL-MCNC: 5.6 MG/DL (ref ?–0.5)

## 2024-09-20 PROCEDURE — 80230 DRUG ASSAY INFLIXIMAB: CPT

## 2024-09-20 PROCEDURE — 86140 C-REACTIVE PROTEIN: CPT

## 2024-09-20 PROCEDURE — 36415 COLL VENOUS BLD VENIPUNCTURE: CPT

## 2024-09-20 PROCEDURE — 82397 CHEMILUMINESCENT ASSAY: CPT

## 2024-09-23 ENCOUNTER — LAB ENCOUNTER (OUTPATIENT)
Dept: LAB | Age: 74
End: 2024-09-23
Attending: FAMILY MEDICINE
Payer: COMMERCIAL

## 2024-09-23 DIAGNOSIS — K12.1 MOUTH ULCERS: ICD-10-CM

## 2024-09-23 DIAGNOSIS — K52.9 ENTERITIS: ICD-10-CM

## 2024-09-23 DIAGNOSIS — K50.019 CROHN'S DISEASE OF SMALL INTESTINE WITH COMPLICATION (HCC): ICD-10-CM

## 2024-09-23 DIAGNOSIS — R19.5 ELEVATED FECAL CALPROTECTIN: ICD-10-CM

## 2024-09-23 DIAGNOSIS — R63.4 WEIGHT LOSS: ICD-10-CM

## 2024-09-23 PROCEDURE — 83993 ASSAY FOR CALPROTECTIN FECAL: CPT

## 2024-09-24 LAB — CALPROTECTIN STL-MCNT: 106 ΜG/G (ref ?–50)

## 2024-09-30 LAB
INFLIXIMAB AB: 1548 NG/ML
INFLIXIMAB LVL: <0.4 UG/ML

## 2024-10-02 ENCOUNTER — APPOINTMENT (OUTPATIENT)
Dept: CT IMAGING | Facility: HOSPITAL | Age: 74
End: 2024-10-02
Attending: EMERGENCY MEDICINE
Payer: COMMERCIAL

## 2024-10-02 ENCOUNTER — HOSPITAL ENCOUNTER (EMERGENCY)
Facility: HOSPITAL | Age: 74
Discharge: HOME | End: 2024-10-02
Attending: EMERGENCY MEDICINE
Payer: COMMERCIAL

## 2024-10-02 VITALS
TEMPERATURE: 97 F | WEIGHT: 116 LBS | RESPIRATION RATE: 23 BRPM | BODY MASS INDEX: 21.9 KG/M2 | DIASTOLIC BLOOD PRESSURE: 53 MMHG | HEART RATE: 50 BPM | OXYGEN SATURATION: 100 % | SYSTOLIC BLOOD PRESSURE: 114 MMHG | HEIGHT: 61 IN

## 2024-10-02 DIAGNOSIS — N20.1 LEFT URETERAL STONE: Primary | ICD-10-CM

## 2024-10-02 PROBLEM — K43.5 PARASTOMAL HERNIA WITHOUT OBSTRUCTION OR GANGRENE: Status: ACTIVE | Noted: 2024-10-02

## 2024-10-02 LAB
ALBUMIN SERPL-MCNC: 4.1 G/DL (ref 3.2–4.8)
ALBUMIN/GLOB SERPL: 1 {RATIO} (ref 1–2)
ALP LIVER SERPL-CCNC: 103 U/L
ALT SERPL-CCNC: 14 U/L
ANION GAP SERPL CALC-SCNC: 9 MMOL/L (ref 0–18)
AST SERPL-CCNC: 21 U/L (ref ?–34)
BASOPHILS # BLD AUTO: 0.1 X10(3) UL (ref 0–0.2)
BASOPHILS NFR BLD AUTO: 0.7 %
BILIRUB SERPL-MCNC: 0.5 MG/DL (ref 0.2–1.1)
BILIRUB UR QL STRIP.AUTO: NEGATIVE
BUN BLD-MCNC: 28 MG/DL (ref 9–23)
CALCIUM BLD-MCNC: 10 MG/DL (ref 8.7–10.4)
CHLORIDE SERPL-SCNC: 104 MMOL/L (ref 98–112)
CLARITY UR REFRACT.AUTO: CLEAR
CO2 SERPL-SCNC: 18 MMOL/L (ref 21–32)
COLOR UR AUTO: YELLOW
CREAT BLD-MCNC: 1.55 MG/DL
EGFRCR SERPLBLD CKD-EPI 2021: 35 ML/MIN/1.73M2 (ref 60–?)
EOSINOPHIL # BLD AUTO: 0.2 X10(3) UL (ref 0–0.7)
EOSINOPHIL NFR BLD AUTO: 1.5 %
ERYTHROCYTE [DISTWIDTH] IN BLOOD BY AUTOMATED COUNT: 13.5 %
GLOBULIN PLAS-MCNC: 4 G/DL (ref 2–3.5)
GLUCOSE BLD-MCNC: 145 MG/DL (ref 70–99)
GLUCOSE UR STRIP.AUTO-MCNC: NEGATIVE MG/DL
HCT VFR BLD AUTO: 44 %
HGB BLD-MCNC: 14.9 G/DL
IMM GRANULOCYTES # BLD AUTO: 0.17 X10(3) UL (ref 0–1)
IMM GRANULOCYTES NFR BLD: 1.3 %
KETONES UR STRIP.AUTO-MCNC: NEGATIVE MG/DL
LIPASE SERPL-CCNC: 60 U/L (ref 12–53)
LYMPHOCYTES # BLD AUTO: 2.95 X10(3) UL (ref 1–4)
LYMPHOCYTES NFR BLD AUTO: 21.9 %
MCH RBC QN AUTO: 27.3 PG (ref 26–34)
MCHC RBC AUTO-ENTMCNC: 33.9 G/DL (ref 31–37)
MCV RBC AUTO: 80.7 FL
MONOCYTES # BLD AUTO: 0.9 X10(3) UL (ref 0.1–1)
MONOCYTES NFR BLD AUTO: 6.7 %
NEUTROPHILS # BLD AUTO: 9.17 X10 (3) UL (ref 1.5–7.7)
NEUTROPHILS # BLD AUTO: 9.17 X10(3) UL (ref 1.5–7.7)
NEUTROPHILS NFR BLD AUTO: 67.9 %
NITRITE UR QL STRIP.AUTO: NEGATIVE
OSMOLALITY SERPL CALC.SUM OF ELEC: 280 MOSM/KG (ref 275–295)
PH UR STRIP.AUTO: 6.5 [PH] (ref 5–8)
PLATELET # BLD AUTO: 441 10(3)UL (ref 150–450)
POTASSIUM SERPL-SCNC: 4.9 MMOL/L (ref 3.5–5.1)
PROT SERPL-MCNC: 8.1 G/DL (ref 5.7–8.2)
RBC # BLD AUTO: 5.45 X10(6)UL
SODIUM SERPL-SCNC: 131 MMOL/L (ref 136–145)
SP GR UR STRIP.AUTO: 1.02 (ref 1–1.03)
UROBILINOGEN UR STRIP.AUTO-MCNC: 0.2 MG/DL
WBC # BLD AUTO: 13.5 X10(3) UL (ref 4–11)

## 2024-10-02 PROCEDURE — 74176 CT ABD & PELVIS W/O CONTRAST: CPT | Performed by: EMERGENCY MEDICINE

## 2024-10-02 PROCEDURE — 99244 OFF/OP CNSLTJ NEW/EST MOD 40: CPT | Performed by: STUDENT IN AN ORGANIZED HEALTH CARE EDUCATION/TRAINING PROGRAM

## 2024-10-02 RX ORDER — SODIUM CHLORIDE 9 MG/ML
INJECTION, SOLUTION INTRAVENOUS CONTINUOUS
Status: DISCONTINUED | OUTPATIENT
Start: 2024-10-02 | End: 2024-10-02

## 2024-10-02 RX ORDER — HYDROCODONE BITARTRATE AND ACETAMINOPHEN 5; 325 MG/1; MG/1
1-2 TABLET ORAL EVERY 4 HOURS PRN
Qty: 20 TABLET | Refills: 0 | Status: SHIPPED | OUTPATIENT
Start: 2024-10-02

## 2024-10-02 RX ORDER — SODIUM CHLORIDE 9 MG/ML
125 INJECTION, SOLUTION INTRAVENOUS CONTINUOUS
Status: DISCONTINUED | OUTPATIENT
Start: 2024-10-02 | End: 2024-10-02

## 2024-10-02 RX ORDER — TAMSULOSIN HYDROCHLORIDE 0.4 MG/1
0.4 CAPSULE ORAL DAILY
Qty: 7 CAPSULE | Refills: 0 | Status: SHIPPED | OUTPATIENT
Start: 2024-10-02 | End: 2024-10-09

## 2024-10-02 RX ORDER — ONDANSETRON 4 MG/1
4 TABLET, ORALLY DISINTEGRATING ORAL EVERY 4 HOURS PRN
Qty: 10 TABLET | Refills: 0 | Status: SHIPPED | OUTPATIENT
Start: 2024-10-02 | End: 2024-10-09

## 2024-10-02 RX ORDER — HYDROMORPHONE HYDROCHLORIDE 1 MG/ML
0.5 INJECTION, SOLUTION INTRAMUSCULAR; INTRAVENOUS; SUBCUTANEOUS EVERY 30 MIN PRN
Status: DISCONTINUED | OUTPATIENT
Start: 2024-10-02 | End: 2024-10-02

## 2024-10-02 RX ORDER — ONDANSETRON 2 MG/ML
4 INJECTION INTRAMUSCULAR; INTRAVENOUS ONCE
Status: COMPLETED | OUTPATIENT
Start: 2024-10-02 | End: 2024-10-02

## 2024-10-02 NOTE — CONSULTS
[facility]  Report of Consultation    Mae Mark Patient Status:  Emergency    1950 MRN [id number]   Location [facility] EMERGENCY DEPARTMENT Attending Joao Wagoner MD   Hosp Day # 0 PCP Mahi Valentino MD     Requesting Physician:  Patient was seen at the request of ED physician, Dr. Wagoner    Reason for Consultation:  Parastomal hernia    Chief Complaint:  Left flank pain    History of Present Illness:  Mae Mark is a 74 year old female with past medical history of Crohn's, status post colon resection with end ileostomy in , recurrent kidney stones, who presents to [facility] ED today with left flank pain and concern for kidney stone.  Patient reports known left renal stones, for which she follows with Dr. Marie.  She reports acute onset of left flank pain this AM, with radiation to her left lower quadrant.  Symptoms felt similar to previous stones.  She had associated nausea and vomiting during episode of pain.  Denied any dysuria, frequency, urgency, hematuria.    Upon presentation to the emergency department, patient was hemodynamically stable and afebrile.  CBC with leukocytosis to 13.5, hemoglobin 14.9, platelets 441.  CMP with elevated creatinine at 1.55.  Sodium 131, CO2 18, glucose 145.  LFTs within normal limits.  Lipase 60.  Urinalysis with trace blood, though no bacteria.  CT abdomen and pelvis was obtained which confirmed a 2 mm left UVJ stone with moderate left hydro.  She was also noted to have right lower quadrant stoma with parastomal hernia containing small bowel.  Proximal to the hernia she had a loop of bowel which appears thickened and with some stranding, with proximal bowel dilatation.  Per radiology interpretation, consistent with enteritis with associated ileus, with partial small bowel obstruction not excluded.  General surgery was consulted for further evaluation.    Patient states that she has known about her parastomal hernia for about 4-5  years.  She reports occasional discomfort when coughing, otherwise denies any pain to or around her stoma.  She states that her bowel movements have been entirely normal for her.  She continues to have good flatus and stool output from ostomy.  She is scheduled to see Dr. Navarro in 2 weeks.  She currently denies any abdominal pain.    Past medical history Crohn's, follows with Dr. Lewis.  Anxiety, hyperlipidemia, diabetes, PERLA.  Patient denies any current steroid use.  Not on anticoagulation.    Past abdominal surgical history includes colectomy with end ileostomy in 1979.  She reports revision of ostomy and small bowel resection in 1981.  Cholecystectomy.  Distant history of appendectomy.        History:  Past Medical History:    Abdominal distention    Abdominal hernia    2021    Abdominal pain    years ago before ostomy    Anemia    occasional in the past    Anxiety    Arthritis    Asthma (HCC)    Atypical mole    Back pain    Bad breath    mouth hot/dry    Belching    Some liquids have begun causing belching. Sodas, sometimes water    Black stools    years ago    Bleeding nose    Bloating    Blood in the stool    years ago    Blood in urine    Blurred vision    Body piercing    Calculus of kidney    Have just been told I have kidney stones by [facility] Doctor    Change in hair    hair drier than usual    Chest pain on exertion    pressure    Chronic cough    Crohn's disease (HCC)    Diabetes mellitus (HCC)    Diarrhea, unspecified    Dizziness    still not steady    Easy bruising    Fatigue    Fever    Flatulence/gas pain/belching    No more than normal    Food intolerance    Frequent urination    Headache disorder    from deyhdration?    Hearing impairment    hearing aids    Hearing loss    Dr. Perea    Heartburn    Hemorrhoids    High cholesterol    Indigestion    have no gallbladder    Irregular bowel habits    chron's    Itch of skin    all over/not severe but bothersome when skin very dry.  Scratch & is inflammed    Kidney failure    mild    Leg swelling    ankles-once in a while    Loss of appetite    Mouth sores    fever blisters inside lips/tongue    Nausea    since January 2018-occasional    Night sweats    Osteoarthritis    hands, knees    Pain in joints    Painful swallowing    not painful but uncomfortable    Painful urination    Prediabetes    Problems with swallowing    Began taking Terbinafine March 22, 2023 for a toenail fungal infection. Began noticing irritated throat/top of gut were burning and swallowing becoming uncomfortable. Also experiencing weakness, dehydration, nausea, taste buds gone, loss of appetite    Rash    Shortness of breath    smoker    Skin blushing/flushing    years ago with active crohn's/before ileostomy    Sleep apnea    trouble sleeping since June 2023    Sleep disturbance    Sputum production    Stool incontinence    years ago    Stress    typical on occasion    Uncomfortable fullness after meals    Visual impairment    glasses    Vomiting    Wears glasses    Weight gain    Weight loss    dieted in Sept. 2022. lost about 8 lbs. Regained about 4. April, 2023 taste buds destroyed (temporarily?) due to reaction to Terbinafine. Lost about 20 lbs. between late April & September, 2023.    Wheezing     Past Surgical History:   Procedure Laterality Date    Appendectomy      removed during ileostomy?    Bowel resection      Cataract Left 09/2018    Cataract Right 10/2018    Cholecystectomy      Colectomy      Colonoscopy  ?    over 10 years    Correct bunion,simple      Hand/finger surgery unlisted Right     trigger thumb release    Hand/finger surgery unlisted Left 06/19/2014    A1 Pulley release    Part removal colon w end colostomy      colon resection w/ileostomy    Revision of ileostomy,simple       Family History   Problem Relation Age of Onset    Diabetes Father         father    Cancer Father         lung, +smoker    Crohn's Disease Sister     Crohn's Disease Sister          Crohn's - her son also has Crohn's now, also my great niece has Crohn's    Diabetes Brother     Crohn's Disease Brother             Cancer Brother         lung cancer, +smoker    Breast Cancer Maternal Grandmother 80            Crohn's Disease Nephew     Crohn's Disease Other       reports that she has been smoking cigarettes. She started smoking about 51 years ago. She has a 25.6 pack-year smoking history. She has never used smokeless tobacco. She reports that she does not currently use alcohol. She reports current drug use. Drug: Cannabis.    Allergies:  Allergies   Allergen Reactions    Mold Spores ASTHMA, ITCHING, Runny nose and WHEEZING       Medications:  (Not in a hospital admission)        Current Facility-Administered Medications:     HYDROmorphone (Dilaudid) 1 MG/ML injection 0.5 mg, 0.5 mg, Intravenous, Q30 Min PRN    sodium chloride 0.9% infusion, 125 mL/hr, Intravenous, Continuous    [COMPLETED] sodium chloride 0.9 % IV bolus 500 mL, 500 mL, Intravenous, Once **FOLLOWED BY** sodium chloride 0.9% infusion, , Intravenous, Continuous    Review of Systems:  Review of Systems   Constitutional:  Negative for chills and fever.   Respiratory:  Negative for chest tightness and shortness of breath.    Cardiovascular:  Negative for chest pain and leg swelling.   Gastrointestinal:  Positive for nausea and vomiting. Negative for heartburn, abdominal pain, diarrhea, constipation, blood in stool, abdominal distention, anal bleeding and rectal pain.   Genitourinary:  Positive for flank pain. Negative for dysuria and difficulty urinating.   Skin:  Negative for color change and pallor.   Neurological:  Negative for dizziness, weakness and light-headedness.   All other systems reviewed and are negative.      Physical Exam:  /55   Pulse 54   Temp 97 °F (36.1 °C) (Temporal)   Resp 16   Ht 61\"   Wt 116 lb (52.6 kg)   LMP  (LMP Unknown)   SpO2 100%   BMI 21.92 kg/m²   Physical  Exam  Constitutional:       General: She is not in acute distress.     Appearance: Normal appearance. She is not toxic-appearing.   HENT:      Head: Normocephalic and atraumatic.      Nose: Nose normal.      Mouth/Throat:      Mouth: Mucous membranes are moist.   Eyes:      Conjunctiva/sclera: Conjunctivae normal.   Cardiovascular:      Rate and Rhythm: Normal rate and regular rhythm.   Pulmonary:      Effort: Pulmonary effort is normal. No respiratory distress.   Abdominal:      General: Abdomen is flat. Bowel sounds are normal. There is no distension.      Palpations: Abdomen is soft.      Tenderness: There is no abdominal tenderness. There is no guarding or rebound.      Hernia: A hernia (parastomal hernia, partially reducible, without any overlying tenderness) is present.      Comments: R ileostomy with stool and flatus in appliance   Musculoskeletal:         General: No swelling or deformity. Normal range of motion.      Cervical back: Normal range of motion and neck supple.   Skin:     General: Skin is warm and dry.      Coloration: Skin is not jaundiced.      Findings: No erythema.   Neurological:      General: No focal deficit present.      Mental Status: She is alert.   Psychiatric:         Mood and Affect: Mood normal.         Behavior: Behavior normal.         Laboratory Data:  Recent Labs   Lab 10/02/24  0818   RBC 5.45*   HGB 14.9   HCT 44.0   MCV 80.7   MCH 27.3   MCHC 33.9   RDW 13.5   NEPRELIM 9.17*   WBC 13.5*   .0       Recent Labs   Lab 10/02/24  0816   *   BUN 28*   CREATSERUM 1.55*   CA 10.0   ALB 4.1   *   K 4.9      CO2 18.0*   ALKPHO 103   AST 21   ALT 14   BILT 0.5   TP 8.1         No results for input(s): \"PTP\", \"INR\", \"PTT\" in the last 168 hours.      CT ABDOMEN+PELVIS KIDNEYSTONE 2D RNDR(NO IV,NO ORAL)(CPT=74176)    Result Date: 10/2/2024  CONCLUSION:  1. There is 2 mm left UVJ stone causing moderate left hydronephrosis. 2. There is bilateral nonobstructing  nephrolithiasis. 3. Right lower quadrant stoma is noted with peristomal hernia containing small bowel.  Proximal to the hernia there is a markedly abnormal loop of small bowel which demonstrates marked wall thickening and perienteric stranding.  There is upstream bowel dilatation.  Findings could represent severe enteritis with associated ileus.  Partial small bowel obstruction would not be excluded.     LOCATION:  [facility]   Dictated by (CST): Dallin Nichols MD on 10/02/2024 at 10:14 AM     Finalized by (CST): Dallin Nichols MD on 10/02/2024 at 10:18 AM          Norah Palacio PA-C  10/2/2024  12:08 PM    Is this a shared or split note between Advanced Practice Provider and Physician? Yes       Medical Decision Making       I have personally reviewed the imaging of patient's CT scan of the abdomen and pelvis from today and compared this to her prior CT on 8/14/2024.    Impression:  Patient Active Problem List   Diagnosis    Primary localized osteoarthrosis, hand    Arthritis    Crohn's disease (HCC)    Retroperitoneal lymphadenopathy    Elevated uric acid in blood    Asthma-COPD overlap syndrome (HCC)    B12 deficiency    Tinnitus of both ears    Dry eyes    Degenerative disc disease, lumbar    Swollen lymph nodes    Peristomal hernia    Acute kidney injury (HCC)    Azotemia    Hyperglycemia    Abdominal pain of unknown etiology    Hyponatremia    History of acute renal failure    Fungal infection of toenail    Odynophagia    Regional enteritis of small intestine with large intestine (HCC)    Elevated glucose    Renal insufficiency    Diet-controlled diabetes mellitus (HCC)    Fungal rash of torso    Right-sided chest wall pain    Bilateral leg edema    Scalp lesion    Hair loss    Anxiety and depression    Lightheadedness       Mae Mark is a 74 year old female with past medical history of Crohn's, status post colon resection with end ileostomy in 1979, recurrent kidney stones, who presents to [facility]  ED today with left flank pain and concern for kidney stone.  Patient reports known left renal stones, for which she follows with Dr. Marie.  She reports acute onset of left flank pain this AM, with radiation to her left lower quadrant.  Symptoms felt similar to previous stones.  She had associated nausea and vomiting during episode of pain.  Denied any dysuria, frequency, urgency, hematuria.    Upon presentation to the emergency department, patient was hemodynamically stable and afebrile.  CBC with leukocytosis to 13.5, hemoglobin 14.9, platelets 441.  CMP with elevated creatinine at 1.55.  Sodium 131, CO2 18, glucose 145.  LFTs within normal limits.  Lipase 60.  Urinalysis with trace blood, though no bacteria.  CT abdomen and pelvis was obtained which confirmed a 2 mm left UVJ stone with moderate left hydro.  She was also noted to have right lower quadrant stoma with parastomal hernia containing small bowel.  Proximal to the hernia she had a loop of bowel which appears thickened and with some stranding, with proximal bowel dilatation.  Per radiology interpretation, consistent with enteritis with associated ileus, with partial small bowel obstruction not excluded.  General surgery was consulted for further evaluation.    Patient states that she has known about her parastomal hernia for about 4-5 years.  She reports occasional discomfort when coughing, otherwise denies any pain to or around her stoma.  She states that her bowel movements have been entirely normal for her.  She continues to have good flatus and stool output from ostomy.  She is scheduled to see Dr. Navarro in 2 weeks.  She currently denies any abdominal pain.    Past medical history Crohn's, follows with Dr. Lewis.  Anxiety, hyperlipidemia, diabetes, PERLA.  Patient denies any current steroid use.  Not on anticoagulation.    Past abdominal surgical history includes colectomy with end ileostomy in 1979.  She reports revision of ostomy and small bowel  resection in 1981.  Cholecystectomy.  Distant history of appendectomy.    Patient is well-appearing on exam.  She is hemodynamically normal.  Her abdomen is soft, non-distended and non-tender to palpation.  She has a well-healed midline scar.  She has a right sided ileostomy with a parastomal hernia.  The parastomal hernia is at least partially reducible.  There is no tenderness around the hernia.  The colostomy is productive of stool.    CT scan today shows a parastomal hernia containing small bowel.  This has a similar appearance on my review of the imaging compared to her most recent prior CT scan on 8/14/2024.    Recommendations:  No indication for urgent or emergent parastomal hernia repair at this time.  Patient has no clinical symptoms to suggest bowel obstruction or strangulation.  This appears to be a chronic hernia which is minimally symptomatic.  I do recommend patient follows with me in the office as scheduled on 10/14/2024.  We can have further discussion on elective parastomal hernia repair at that time.  Advised patient to return to the emergency room if she develops severe abdominal pain vomiting,  sudden change in her ileostomy output or any other new/worsening symptoms. Patient expressed understanding was agreeable to plan.    Ghassan Navarro MD  [facility]

## 2024-10-02 NOTE — ED QUICK NOTES
MD made aware that pt's HR is in the low 40's and pt states she is normally low d/t her Chron's. NNO.

## 2024-10-02 NOTE — ED INITIAL ASSESSMENT (HPI)
Pt c/o pain in L flank, nausea and pressure to urinate which began this am. Pt  had a kidney stone removed a couple of mos ago and states she has other kidney stones the Dr was watching. Hx Chron's ileostomy.

## 2024-10-02 NOTE — ED PROVIDER NOTES
Patient Seen in: [facility] Emergency Department      History     Chief Complaint   Patient presents with    Abdomen/Flank Pain     Left side flank pain. \"Patient states, I am passing a kidney stone\".     Nausea/Vomiting/Diarrhea     vomitting     Stated Complaint: L flank pain, vomitting    Subjective:   HPI  74-year-old female comes to the hospital complaint of having pain in the area of her left flank.  Says started pretty suddenly at about 6:30 AM.  She has had a history of kidney stone in the past that felt like this.  She does have a lot of nausea as well.  She denies any fevers or chills.  She has no chest pain or troubles breathing.  Nothing specific makes her pain better or worse.  She feels that she is unable to urinate at this time and needs to.  She is denying any other complaints.  Has had a history of having kidney stones in the past.      Objective:     Past Medical History:    Abdominal distention    Abdominal hernia    2021    Abdominal pain    years ago before ostomy    Anemia    occasional in the past    Anxiety    Arthritis    Asthma (HCC)    Atypical mole    Back pain    Bad breath    mouth hot/dry    Belching    Some liquids have begun causing belching. Sodas, sometimes water    Black stools    years ago    Bleeding nose    Bloating    Blood in the stool    years ago    Blood in urine    Blurred vision    Body piercing    Calculus of kidney    Have just been told I have kidney stones by [facility] Doctor    Change in hair    hair drier than usual    Chest pain on exertion    pressure    Chronic cough    Crohn's disease (HCC)    Diabetes mellitus (HCC)    Diarrhea, unspecified    Dizziness    still not steady    Easy bruising    Fatigue    Fever    Flatulence/gas pain/belching    No more than normal    Food intolerance    Frequent urination    Headache disorder    from deyhdration?    Hearing impairment    hearing aids    Hearing loss    Dr. Perea    Heartburn    Hemorrhoids     High cholesterol    Indigestion    have no gallbladder    Irregular bowel habits    chron's    Itch of skin    all over/not severe but bothersome when skin very dry. Scratch & is inflammed    Kidney failure    mild    Leg swelling    ankles-once in a while    Loss of appetite    Mouth sores    fever blisters inside lips/tongue    Nausea    since January 2018-occasional    Night sweats    Osteoarthritis    hands, knees    Pain in joints    Painful swallowing    not painful but uncomfortable    Painful urination    Prediabetes    Problems with swallowing    Began taking Terbinafine March 22, 2023 for a toenail fungal infection. Began noticing irritated throat/top of gut were burning and swallowing becoming uncomfortable. Also experiencing weakness, dehydration, nausea, taste buds gone, loss of appetite    Rash    Shortness of breath    smoker    Skin blushing/flushing    years ago with active crohn's/before ileostomy    Sleep apnea    trouble sleeping since June 2023    Sleep disturbance    Sputum production    Stool incontinence    years ago    Stress    typical on occasion    Uncomfortable fullness after meals    Visual impairment    glasses    Vomiting    Wears glasses    Weight gain    Weight loss    dieted in Sept. 2022. lost about 8 lbs. Regained about 4. April, 2023 taste buds destroyed (temporarily?) due to reaction to Terbinafine. Lost about 20 lbs. between late April & September, 2023.    Wheezing              Past Surgical History:   Procedure Laterality Date    Appendectomy      removed during ileostomy?    Bowel resection      Cataract Left 09/2018    Cataract Right 10/2018    Cholecystectomy      Colectomy      Colonoscopy  ?    over 10 years    Correct bunion,simple      Hand/finger surgery unlisted Right     trigger thumb release    Hand/finger surgery unlisted Left 06/19/2014    A1 Pulley release    Part removal colon w end colostomy      colon resection w/ileostomy    Revision of ileostomy,simple                   Social History     Socioeconomic History    Marital status:    Tobacco Use    Smoking status: Every Day     Current packs/day: 0.50     Average packs/day: 0.5 packs/day for 51.2 years (25.6 ttl pk-yrs)     Types: Cigarettes     Start date: 7/5/1973    Smokeless tobacco: Never    Tobacco comments:     tried Chantix three times-after about 2 months I became depressed and irritable.   Vaping Use    Vaping status: Never Used   Substance and Sexual Activity    Alcohol use: Not Currently     Comment: very rarely consume alcohol    Drug use: Yes     Types: Cannabis    Sexual activity: Not Currently   Social History Narrative    , no children.  Retired, previously worked in [employer].              Physical Exam     ED Triage Vitals [10/02/24 0807]   /62   Pulse 57   Resp 18   Temp 97 °F (36.1 °C)   Temp src Temporal   SpO2 100 %   O2 Device None (Room air)       Current Vitals:   Vital Signs  BP: 121/48  Pulse: 51  Resp: 11  Temp: 97 °F (36.1 °C)  Temp src: Temporal  MAP (mmHg): 69    Oxygen Therapy  SpO2: 97 %  O2 Device: None (Room air)        Physical Exam  HEENT : NCAT, EOMI, PEERL,  neck supple, no JVD, trachea midline, No LAD  Heart: S1S2 normal. No murmurs, regular rate and rhythm  Lungs: Clear to auscultation bilaterally  Abdomen: Soft left abdominal region is somewhat tender nondistended normal active bowel sounds without rebound, guarding or masses noted  Back nontender without CVA tenderness  Extremity no clubbing, cyanosis or edema noted.  Full range of motion noted without tenderness  Neuro: No focal deficits noted    All measures to prevent infection transmission during my interaction with the patient were taken.  The patient was already wearing droplet mask on my arrival to the room.  Personal protective equipment including a droplet mask as well as gloves were worn throughout the duration of my exam.  Hand washing was performed prior to and after the exam.  Stethoscope  and equipment used during my examination was cleaned with a super Sani cloth germicidal wipe following the exam.    ED Course     Labs Reviewed   COMP METABOLIC PANEL (14) - Abnormal; Notable for the following components:       Result Value    Glucose 145 (*)     Sodium 131 (*)     CO2 18.0 (*)     BUN 28 (*)     Creatinine 1.55 (*)     eGFR-Cr 35 (*)     Globulin  4.0 (*)     All other components within normal limits   CBC WITH DIFFERENTIAL WITH PLATELET - Abnormal; Notable for the following components:    WBC 13.5 (*)     RBC 5.45 (*)     Neutrophil Absolute Prelim 9.17 (*)     Neutrophil Absolute 9.17 (*)     All other components within normal limits   LIPASE - Abnormal; Notable for the following components:    Lipase 60 (*)     All other components within normal limits   URINALYSIS WITH CULTURE REFLEX - Abnormal; Notable for the following components:    Blood Urine Trace-Intact (*)     Protein Urine Trace (*)     Leukocyte Esterase Urine Small (*)     All other components within normal limits   UA MICROSCOPIC ONLY, URINE - Normal   RAINBOW DRAW BLUE   URINE CULTURE, ROUTINE       ED Course as of 10/02/24 1316  ------------------------------------------------------------  Time: 10/02 1312  Comment: While here the patient's urine did have some blood in it but no infection.  Her white count is 13.5 thousand.  The patient's lipase was 60.  The patient's electrolytes were stable.  The patient had a CT of the abdomen pelvis that I interpreted consistent with a left-sided ureteral stone.  Read the radiology report as well.  In addition to this there is some inflammation by the ostomy with a hernia.  I spoke with Dr. Navarro from surgery about the concern of the radiologist for possible bowel obstruction but the patient is clinically does not have 1.  Dr. Hightower to see the patient in the department and is in agreement and at this time the patient will be discharged home for outpatient management and care.  She was  given IV fluid, Dilaudid and Zofran and is pain-free with her stone.       CT ABDOMEN+PELVIS KIDNEYSTONE 2D RNDR(NO IV,NO ORAL)(CPT=74176)    Result Date: 10/2/2024  PROCEDURE:  CT ABDOMEN+PELVIS KIDNEYSTONE 2D RNDR(NO IV,NO ORAL)(CPT=74176)  COMPARISON:  [facility] , CT, CT ABDOMEN+PELVIS KIDNEYSTONE 2D RNDR(NO IV,NO ORAL)(CPT=74176), 8/14/2024, 7:20 AM.  INDICATIONS:  L flank pain, vomitting  TECHNIQUE:  Unenhanced multislice CT scanning from above the kidneys to below the urinary bladder.  2D rendering are generated on the CT scanner workstation to localize potential stones in the cranio-caudal plane.  Dose reduction techniques were used. Dose information is transmitted to the ACR (American College of Radiology) NRDR (National Radiology Data Registry) which includes the Dose Index Registry.  PATIENT STATED HISTORY: (As transcribed by Technologist)  Patient is here for left flank pain and nausea.    FINDINGS:  KIDNEYS:  There is a 2 mm left UVJ calcification series 3, image 163 with moderate left hydronephrosis.  There are multiple nonobstructing calcifications measuring 2-5 mm in both kidneys.  Large exophytic cyst left kidney is stable. BLADDER:  No mass, calculus or significant wall thickening. ADRENALS:  No mass or enlargement.  LIVER:  No enlargement, atrophy, abnormal density, or significant focal lesion.  BILIARY:  There has been prior cholecystectomy. PANCREAS:  No lesion, fluid collection, ductal dilatation, or atrophy.  SPLEEN:  No enlargement or focal lesion.  AORTA/VASCULAR:  Aorta is diffusely atherosclerotic but not aneurysmal. RETROPERITONEUM:  No mass or adenopathy.  BOWEL/MESENTERY:  There is a right lower quadrant ostomy.  Patient appears to have had a total colectomy.  There is peristomal hernia containing fairly large segment of small bowel.  Proximal to the hernia there is a loop of small bowel which demonstrates marked wall thickening and perienteric stranding.  There is upstream luminal  distention.  This could represent focal enteritis with associated ileus.  Partial small bowel obstruction would not be excluded. ABDOMINAL WALL:  Right peristomal hernia is described above.  There appears to have been a total colectomy. BONES:  There is advanced degenerative change in the spine. PELVIC ORGANS:  Pelvic organs are otherwise unremarkable. LUNG BASES:  No visible pulmonary or pleural disease.  OTHER:  Negative.             CONCLUSION:  1. There is 2 mm left UVJ stone causing moderate left hydronephrosis. 2. There is bilateral nonobstructing nephrolithiasis. 3. Right lower quadrant stoma is noted with peristomal hernia containing small bowel.  Proximal to the hernia there is a markedly abnormal loop of small bowel which demonstrates marked wall thickening and perienteric stranding.  There is upstream bowel dilatation.  Findings could represent severe enteritis with associated ileus.  Partial small bowel obstruction would not be excluded.     LOCATION:  [facility]   Dictated by (CST): Dallin Nichols MD on 10/02/2024 at 10:14 AM     Finalized by (CST): Dallin Nichols MD on 10/02/2024 at 10:18 AM        Medications   HYDROmorphone (Dilaudid) 1 MG/ML injection 0.5 mg (0.5 mg Intravenous Given 10/2/24 0821)   sodium chloride 0.9% infusion (0 mL/hr Intravenous Stopped 10/2/24 1048)   sodium chloride 0.9 % IV bolus 500 mL (0 mL Intravenous Stopped 10/2/24 1047)     Followed by   sodium chloride 0.9% infusion ( Intravenous New Bag 10/2/24 1048)   ondansetron (Zofran) 4 MG/2ML injection 4 mg (4 mg Intravenous Given 10/2/24 0821)            MDM      Differential diagnosis included pyelonephritis, ureteral stone, UTI, bowel obstruction but not limited such.  Patient does have a ureteral stone and is now pain free.  No sign of infection is noted in her bladder.  She does have some enteritis but no signs of obstruction.  Patient was seen by surgery and will be discharged home for outpatient management care.    Patient was  screened and evaluated during this visit.   As a treating physician attending to the patient, I determined, within reasonable clinical confidence and prior to discharge, that an emergency medical condition was not or was no longer present.  There was no indication for further evaluation, treatment or admission on an emergency basis.       The usual and customary discharge instuctions were discussed given the patient's ER course.  We discussed signs and symptoms that should prompt the patient's immediate return to the emergency department.   Reasonable over the counter and prescription treatment options and Physician follow up plan was discussed.       The patient is discharged in good condition.     This note was prepared using Dragon Medical voice recognition dictation software.  As a result errors may occur.  When identified to these areas have been corrected.  While every attempt is made to correct errors during dictation discrepancies may still exist.  Please contact if there are any errors.                    Medical Decision Making      Disposition and Plan     Clinical Impression:  1. Left ureteral stone         Disposition:  Discharge  10/2/2024  1:16 pm    Follow-up:  Joao Marie MD  [address]  [facility] [address]  [phone]    Schedule an appointment as soon as possible for a visit in 2 day(s)            Medications Prescribed:  Current Discharge Medication List        START taking these medications    Details   tamsulosin (FLOMAX) 0.4 MG Oral Cap Take 1 capsule (0.4 mg total) by mouth daily for 7 days.  Qty: 7 capsule, Refills: 0      ondansetron 4 MG Oral Tablet Dispersible Take 1 tablet (4 mg total) by mouth every 4 (four) hours as needed for Nausea.  Qty: 10 tablet, Refills: 0      !! HYDROcodone-acetaminophen 5-325 MG Oral Tab Take 1-2 tablets by mouth every 4 (four) hours as needed for Pain.  Qty: 20 tablet, Refills: 0    Associated Diagnoses: Left ureteral stone       !! -  Potential duplicate medications found. Please discuss with provider.              Supplementary Documentation:

## 2024-10-02 NOTE — ED QUICK NOTES
Patient discharge instructions and medication reviewed, patient and her  verbalized understanding. PIV was removed and vital signs were acceptable. Patient ambulated out of the department with her  without incident.

## 2024-10-03 ENCOUNTER — TELEPHONE (OUTPATIENT)
Dept: SURGERY | Facility: CLINIC | Age: 74
End: 2024-10-03

## 2024-10-03 NOTE — TELEPHONE ENCOUNTER
Patient stated she was at ER yesterday due to kidney stones and was advise to follow up with Dr Marie  this week but patient is not going to be able until next week. Patient would like to know if she can be seen next week? No openings available.Please advise

## 2024-10-04 RX ORDER — AMOXICILLIN 500 MG/1
500 CAPSULE ORAL 2 TIMES DAILY
Qty: 14 CAPSULE | Refills: 0 | Status: SHIPPED | OUTPATIENT
Start: 2024-10-04 | End: 2024-10-11

## 2024-10-10 ENCOUNTER — LAB ENCOUNTER (OUTPATIENT)
Dept: LAB | Age: 74
End: 2024-10-10
Attending: INTERNAL MEDICINE
Payer: COMMERCIAL

## 2024-10-10 ENCOUNTER — TELEPHONE (OUTPATIENT)
Dept: SURGERY | Facility: CLINIC | Age: 74
End: 2024-10-10

## 2024-10-10 DIAGNOSIS — R42 LIGHTHEADEDNESS: ICD-10-CM

## 2024-10-10 DIAGNOSIS — F41.9 ANXIETY AND DEPRESSION: ICD-10-CM

## 2024-10-10 DIAGNOSIS — F32.A ANXIETY AND DEPRESSION: ICD-10-CM

## 2024-10-10 DIAGNOSIS — E11.9 DIET-CONTROLLED DIABETES MELLITUS (HCC): ICD-10-CM

## 2024-10-10 LAB
ANION GAP SERPL CALC-SCNC: 9 MMOL/L (ref 0–18)
BUN BLD-MCNC: 43 MG/DL (ref 9–23)
CALCIUM BLD-MCNC: 9.7 MG/DL (ref 8.7–10.4)
CHLORIDE SERPL-SCNC: 100 MMOL/L (ref 98–112)
CO2 SERPL-SCNC: 20 MMOL/L (ref 21–32)
CREAT BLD-MCNC: 1.76 MG/DL
EGFRCR SERPLBLD CKD-EPI 2021: 30 ML/MIN/1.73M2 (ref 60–?)
EST. AVERAGE GLUCOSE BLD GHB EST-MCNC: 137 MG/DL (ref 68–126)
FASTING STATUS PATIENT QL REPORTED: NO
GLUCOSE BLD-MCNC: 110 MG/DL (ref 70–99)
HBA1C MFR BLD: 6.4 % (ref ?–5.7)
OSMOLALITY SERPL CALC.SUM OF ELEC: 279 MOSM/KG (ref 275–295)
POTASSIUM SERPL-SCNC: 4.5 MMOL/L (ref 3.5–5.1)
SODIUM SERPL-SCNC: 129 MMOL/L (ref 136–145)
TSI SER-ACNC: 2.01 MIU/ML (ref 0.55–4.78)

## 2024-10-10 PROCEDURE — 84443 ASSAY THYROID STIM HORMONE: CPT | Performed by: INTERNAL MEDICINE

## 2024-10-10 PROCEDURE — 80048 BASIC METABOLIC PNL TOTAL CA: CPT | Performed by: INTERNAL MEDICINE

## 2024-10-10 PROCEDURE — 83036 HEMOGLOBIN GLYCOSYLATED A1C: CPT | Performed by: INTERNAL MEDICINE

## 2024-10-10 NOTE — TELEPHONE ENCOUNTER
Per patient was offered an appointment 10/25, states er follow up instruction was to follow up in two days. Per patient asking if Dr. Marie is aware of the situation. Please advise

## 2024-10-14 ENCOUNTER — OFFICE VISIT (OUTPATIENT)
Facility: LOCATION | Age: 74
End: 2024-10-14
Payer: COMMERCIAL

## 2024-10-14 VITALS
SYSTOLIC BLOOD PRESSURE: 114 MMHG | HEART RATE: 87 BPM | TEMPERATURE: 97 F | DIASTOLIC BLOOD PRESSURE: 78 MMHG | OXYGEN SATURATION: 100 %

## 2024-10-14 DIAGNOSIS — K43.5 PARASTOMAL HERNIA WITHOUT OBSTRUCTION OR GANGRENE: Primary | ICD-10-CM

## 2024-10-14 DIAGNOSIS — F41.9 ANXIETY AND DEPRESSION: ICD-10-CM

## 2024-10-14 DIAGNOSIS — F32.A ANXIETY AND DEPRESSION: ICD-10-CM

## 2024-10-14 DIAGNOSIS — K50.00 CROHN'S DISEASE OF SMALL INTESTINE WITHOUT COMPLICATION (HCC): ICD-10-CM

## 2024-10-14 PROCEDURE — 3074F SYST BP LT 130 MM HG: CPT | Performed by: STUDENT IN AN ORGANIZED HEALTH CARE EDUCATION/TRAINING PROGRAM

## 2024-10-14 PROCEDURE — 3078F DIAST BP <80 MM HG: CPT | Performed by: STUDENT IN AN ORGANIZED HEALTH CARE EDUCATION/TRAINING PROGRAM

## 2024-10-14 PROCEDURE — 99214 OFFICE O/P EST MOD 30 MIN: CPT | Performed by: STUDENT IN AN ORGANIZED HEALTH CARE EDUCATION/TRAINING PROGRAM

## 2024-10-15 ENCOUNTER — OFFICE VISIT (OUTPATIENT)
Dept: SURGERY | Facility: CLINIC | Age: 74
End: 2024-10-15
Payer: COMMERCIAL

## 2024-10-15 DIAGNOSIS — N20.0 RECURRENT KIDNEY STONES: ICD-10-CM

## 2024-10-15 DIAGNOSIS — R82.991 HYPOCITRATURIA: ICD-10-CM

## 2024-10-15 DIAGNOSIS — N39.0 RECURRENT UTI: ICD-10-CM

## 2024-10-15 DIAGNOSIS — N20.0 KIDNEY STONE: Primary | ICD-10-CM

## 2024-10-15 LAB
APPEARANCE: CLEAR
GLUCOSE (URINE DIPSTICK): NEGATIVE MG/DL
KETONES (URINE DIPSTICK): NEGATIVE MG/DL
LEUKOCYTES: NEGATIVE
MULTISTIX LOT#: ABNORMAL NUMERIC
NITRITE, URINE: NEGATIVE
PH, URINE: 5.5 (ref 4.5–8)
PROTEIN (URINE DIPSTICK): 30 MG/DL
SPECIFIC GRAVITY: 1.02 (ref 1–1.03)
URINE-COLOR: YELLOW
UROBILINOGEN,SEMI-QN: 0.2 MG/DL (ref 0–1.9)

## 2024-10-15 PROCEDURE — 81003 URINALYSIS AUTO W/O SCOPE: CPT | Performed by: UROLOGY

## 2024-10-15 PROCEDURE — 99214 OFFICE O/P EST MOD 30 MIN: CPT | Performed by: UROLOGY

## 2024-10-15 RX ORDER — POTASSIUM CITRATE 5 MEQ/1
5 TABLET, EXTENDED RELEASE ORAL 3 TIMES DAILY
Qty: 270 TABLET | Refills: 5 | Status: SHIPPED | OUTPATIENT
Start: 2024-10-15

## 2024-10-15 RX ORDER — HYDROCODONE BITARTRATE AND ACETAMINOPHEN 5; 325 MG/1; MG/1
1 TABLET ORAL EVERY 6 HOURS PRN
Qty: 30 TABLET | Refills: 0 | Status: SHIPPED | OUTPATIENT
Start: 2024-10-15

## 2024-10-15 NOTE — PROGRESS NOTES
HPI:     Mae Mark is a 74 year old female with a PMH of anxiety, HL, DM, PERLA, Crohn's s/p total colectomy + ileostomy (1979), partial SBR, OA.  Following for:  1. Recurrent CaOx kidney stones  - right ureteral stone incidentally noted on imaging. S/p right URS 2/8/24 with likely right IP stone. Known left renal stones.  - no prior episodes  2. Severe hypocitraturia + low UOP as urocit pills are too big  - on 30 urocit solution TID 8/23/24  - 24 h urine 8/16/24: low UOP, citrate, pH (1420, 80, 5.564)  - 30 urocit solution 2/14/24  - 24 h urine 2/25/24: very low UOP, citrate (1175, < 23)  3. [facility]     PCP - Chicho  LOV 8/23/2024    Presents for check-up, review 24 h urine, CT and prior imaging, discuss stone prevention.  Developed severe L flank pain with CT findings below. Pain resolved within a few hours. Having pain at the urethra which improved with abx but persists.  On infusions now for Crohn's flares and feels weak most days.  No flank pain, hematuria, dysuria.  She is taking 30 mEq urocit solution daily as she couldn't swallow the pills and has slightly increased water intake.     UTI hx: none  Gross hematuria: episode ~ 30 y ago, nothing since  Tobacco hx: ~ 80 pack years, currently 1/2 PPD  Fam h/o  malignancy: none    UA is negative    Reported ~ 20-30 oz water, 12 coffee with medium to light yellow urine. Drinking about the same amount. I encouraged the pt drink at least 40-60 oz water per day or enough to keep urine clear to pale yellow for stone prevention.    CT SP 10/2/24: 4 RMP (possibly IP), punctate RLP. 3, 2 LLP  KUB 8/20/24: 4 RMP, 2 LLP  CT SP 8/14/24: 4-5 RMP, 1,1 RLP. Stable L renal stones.   CT AP 11/24/23: 7 proximal right ureteral, 4 RMP. 74 x 78 mm left renal cyst, 2 LLP  CT SP 7/15/23: 4 RMP, 8 RLP    Discussed options for recurrent stones and severe hypocitraturia she will increase urocit by taking 30 urocit solution TID (which is typically BID for her) and add 5 urocit  tablets TID which she thinks she can take    Discussed that as she is taking 30 mEq urocit solution the needs to at least double or triple    I reviewed the 3 prior CT scans with her today and it is possible that the 4 RMP stone is actually intraparenchymal.  I would probably opt to observe for now and check another CT scan in 6 months to ensure stability.  We could also potentially check a CT urogram to see if this gives us more information and she wants to do this.    She will double water intake, increase urocit solution to TID and starting 5 urocit tab TID for severe hypocitraturia. Checking guidance UCx panels for persistent urethral pain and will start abx if this is positive. Prefers observation for the small LLP stones and has Rx for norco in case she develops pain. F/u in 6 mo with CTU prior.    Prior note:  Phone visit once KUB results are back. She is open to what I recommend for the RMP stone but OK treating it with either ESWL or URS.    Prior note:  If she has passed stone I would still recommend cystoscopy at some point given AMH in the past.    She will significantly increase water intake for stone prevention, checking CT SP and OR as noted above. She requests Nephrology referral for CKD. Can consider 24 h urine later but will discuss this at NOV.    HISTORY:  Past Medical History:    Abdominal distention    Abdominal hernia    2021    Abdominal pain    years ago before ostomy    Anemia    occasional in the past    Anxiety    Arthritis    Asthma (HCC)    Atypical mole    Back pain    Bad breath    mouth hot/dry    Belching    Some liquids have begun causing belching. Sodas, sometimes water    Black stools    years ago    Bleeding nose    Bloating    Blood in the stool    years ago    Blood in urine    Blurred vision    Body piercing    Calculus of kidney    Have just been told I have kidney stones by [facility] Doctor    Change in hair    hair drier than usual    Chest pain on exertion     pressure    Chronic cough    Crohn's disease (HCC)    Diabetes mellitus (HCC)    Diarrhea, unspecified    Dizziness    still not steady    Easy bruising    Fatigue    Fever    Flatulence/gas pain/belching    No more than normal    Food intolerance    Frequent urination    Headache disorder    from deyhdration?    Hearing impairment    hearing aids    Hearing loss    Dr. Perea    Heartburn    Hemorrhoids    High cholesterol    Indigestion    have no gallbladder    Irregular bowel habits    chron's    Itch of skin    all over/not severe but bothersome when skin very dry. Scratch & is inflammed    Kidney failure    mild    Leg swelling    ankles-once in a while    Loss of appetite    Mouth sores    fever blisters inside lips/tongue    Nausea    since January 2018-occasional    Night sweats    Osteoarthritis    hands, knees    Pain in joints    Painful swallowing    not painful but uncomfortable    Painful urination    Prediabetes    Problems with swallowing    Began taking Terbinafine March 22, 2023 for a toenail fungal infection. Began noticing irritated throat/top of gut were burning and swallowing becoming uncomfortable. Also experiencing weakness, dehydration, nausea, taste buds gone, loss of appetite    Rash    Shortness of breath    smoker    Skin blushing/flushing    years ago with active crohn's/before ileostomy    Sleep apnea    trouble sleeping since June 2023    Sleep disturbance    Sputum production    Stool incontinence    years ago    Stress    typical on occasion    Uncomfortable fullness after meals    Visual impairment    glasses    Vomiting    Wears glasses    Weight gain    Weight loss    dieted in Sept. 2022. lost about 8 lbs. Regained about 4. April, 2023 taste buds destroyed (temporarily?) due to reaction to Terbinafine. Lost about 20 lbs. between late April & September, 2023.    Wheezing      Past Surgical History:   Procedure Laterality Date    Appendectomy      removed during ileostomy?     Bowel resection      Cataract Left 2018    Cataract Right 10/2018    Cholecystectomy      Colectomy      Colonoscopy  ?    over 10 years    Correct bunion,simple      Hand/finger surgery unlisted Right     trigger thumb release    Hand/finger surgery unlisted Left 2014    A1 Pulley release    Part removal colon w end colostomy      colon resection w/ileostomy    Revision of ileostomy,simple        Family History   Problem Relation Age of Onset    Diabetes Father         father    Cancer Father         lung, +smoker    Crohn's Disease Sister     Crohn's Disease Sister         Crohn's - her son also has Crohn's now, also my great niece has Crohn's    Diabetes Brother         only father    Crohn's Disease Brother             Cancer Brother         lung cancer, +smoker    Breast Cancer Maternal Grandmother 80            Crohn's Disease Nephew     Crohn's Disease Other     Crohn's Disease Brother               Social History:   Social History     Socioeconomic History    Marital status:    Tobacco Use    Smoking status: Every Day     Current packs/day: 0.50     Average packs/day: 0.5 packs/day for 51.3 years (25.6 ttl pk-yrs)     Types: Cigarettes     Start date: 1973    Smokeless tobacco: Never    Tobacco comments:     tried Chantix three times-after about 2 months I became depressed and irritable   Vaping Use    Vaping status: Never Used   Substance and Sexual Activity    Alcohol use: Not Currently     Comment: very rarely consume alcohol    Drug use: Not Currently     Types: Cannabis    Sexual activity: Not Currently   Social History Narrative    , no children.  Retired, previously worked in [employer].        Medications (Active prior to today's visit):  Current Outpatient Medications   Medication Sig Dispense Refill    HYDROcodone-acetaminophen 5-325 MG Oral Tab Take 1-2 tablets by mouth every 4 (four) hours as needed for Pain. 20 tablet 0    PEG 3350-KCl-Na  Bicarb-NaCl 420 g Oral Recon Soln Take 4,000 mL by mouth As Directed. 1 each 0    ondansetron (ZOFRAN) 4 mg tablet Take 1 tablet (4 mg total) by mouth every 8 (eight) hours as needed for Nausea. 90 tablet 0    methylPREDNISolone (MEDROL) 4 MG Oral Tablet Therapy Pack As directed. 21 tablet 0    cephalexin 500 MG Oral Cap TAKE ALL FOUR capsules 30-60 MINUTES prior TO THE procedure      cyclobenzaprine 5 MG Oral Tab Take 1 tablet (5 mg total) by mouth 2 (two) times daily as needed for Muscle spasms.      methylPREDNISolone (MEDROL) 4 MG Oral Tablet Therapy Pack As directed. 1 each 0    potassium citrate-citric acid 1100-334 MG/5ML Oral Solution Take 15 mL (30 mEq total) by mouth 3 (three) times daily with meals. 473 mL 11    escitalopram 5 MG Oral Tab Take 1 tablet (5 mg total) by mouth daily. 30 tablet 1    Glucose Blood (ACCU-CHEK GUIDE) In Vitro Strip Use 1 (one) new strip daily for blood glucose monitoring 100 strip 3    hydroCHLOROthiazide 12.5 MG Oral Cap Take 1 capsule (12.5 mg total) by mouth daily. 30 capsule 0    Accu-Chek FastClix Lancets Does not apply Misc 1 Lancet by Finger stick route 2 (two) times daily.      ketoconazole 2 % External Cream Apply 1 Application topically 2 (two) times daily. APPLY TO AFFECTED AREA      clotrimazole-betamethasone 1-0.05 % External Cream Apply to affected area BID for 2 weeks 60 g 2    HYDROcodone-acetaminophen (NORCO) 5-325 MG Oral Tab Take 1 tablet by mouth every 6 (six) hours as needed for Pain. 30 tablet 0    metRONIDAZOLE 0.75 % External Cream apply TO AFFECTED AREA ON face EVERY DAY BEFORE NOON      pimecrolimus 1 % External Cream apply topically TWICE DAILY TO affected AREAS      Sulfacetamide Sodium-Sulfur 10-5 % External Liquid Apply 1 Application topically 2 (two) times daily.      triamcinolone 0.1 % External Cream APPLY TO THE AFFECTED AREA(S) ON BACK TWICE DAILY FOR UP TO TWO WEEKS. AVOID face, armpits AND groin      albuterol (VENTOLIN HFA) 108 (90 Base)  MCG/ACT Inhalation Aero Soln Inhale 2 puffs into the lungs every 6 (six) hours as needed for Wheezing. 1 each 3    Tiotropium Bromide Monohydrate (SPIRIVA RESPIMAT) 2.5 MCG/ACT Inhalation Aero Soln Inhale 2 puffs into the lungs daily. 1 each 3    acetaminophen 500 MG Oral Tab Take 1 tablet (500 mg total) by mouth every 6 (six) hours as needed for Pain.         Allergies:  Allergies   Allergen Reactions    Mold Spores ASTHMA, ITCHING, Runny nose and WHEEZING         ROS:     A comprehensive 10 point review of systems was completed.  Pertinent positives and negatives noted in the the HPI.    PHYSICAL EXAM:     GENERAL APPEARANCE: well, developed, well nourished, in no acute distress  NEUROLOGIC: nonfocal, alert and oriented  HEAD: normocephalic, atraumatic  EYES: sclera non-icteric  EARS: hearing intact  ORAL CAVITY: mucosa moist  NECK/THYROID: no obvious goiter or masses  LUNGS: nonlabored breathing  ABDOMEN: soft, no obvious masses or tenderness  SKIN: no obvious rashes    : as noted above     ASSESSMENT/PLAN:   Diagnoses and all orders for this visit:    Kidney stone  -     URINALYSIS, AUTO, W/O SCOPE        - as noted above.    Thanks again for this consult.    Joao Marie MD, FACS  Urologist  [facility]  Office: [phone]

## 2024-10-16 PROBLEM — K50.00 CROHN'S DISEASE OF SMALL INTESTINE (HCC): Status: ACTIVE | Noted: 2024-10-16

## 2024-10-16 NOTE — PROGRESS NOTES
Follow Up Visit Note       Active Problems      1. Parastomal hernia without obstruction or gangrene    2. Crohn's disease of small intestine without complication (HCC)          Chief Complaint   Chief Complaint   Patient presents with    New Patient     NP- Peristomal Hernia, 10/2 CT Abd/Pel- reports bulging around stoma, pain in right groin          History of Present Illness  This is a very nice 74-year-old female with history of Crohn's disease status post remote history of open colectomy with creation of end ileostomy in 1979 who returns for follow-up today in regards to a parastomal hernia.    Patient was scheduled to see me electively but ended up presenting to the emergency room on 10/2/2024 with left flank pain.  She was found to have a left-sided kidney stone and was discharged home.    Patient has noted a bulge around her stoma for the last 4-5 years.  She complains of pain that radiates from her navel to her pubic area with coughing.  Patient is a smoker.  She denies any pain around the stoma.  She has been eating less lately and has had less output from the ileostomy.  She has some chronic mild nausea and has felt weak for years now.  She has lost around 25 pounds over the last 18 months.  No issues with pouching.  No known history of prior small bowel obstruction.    Patient is currently being treated with Inflectra and follows with [facility] gastroenterology.  She is scheduled to have an ileoscopy on 10/16/2024 with Dr. Lewis.  Other prior surgery includes a small bowel resection, cholecystectomy and appendectomy in the 80's.  No history of prior hernia repair.      Allergies  Mae is allergic to mold spores.    Past Medical / Surgical / Social / Family History    The past medical and past surgical history have been reviewed by me today.    Past Medical History:    Abdominal distention    Abdominal hernia    2021    Abdominal pain    years ago before ostomy    Anemia    occasional in the past     Anxiety    Arthritis    Asthma (HCC)    Atypical mole    Back pain    Bad breath    mouth hot/dry    Belching    Some liquids have begun causing belching. Sodas, sometimes water    Black stools    years ago    Bleeding nose    Bloating    Blood in the stool    years ago    Blood in urine    Blurred vision    Body piercing    Calculus of kidney    Have just been told I have kidney stones by [facility] Doctor    Change in hair    hair drier than usual    Chest pain on exertion    pressure    Chronic cough    Crohn's disease (HCC)    Diabetes mellitus (HCC)    Diarrhea, unspecified    Dizziness    still not steady    Easy bruising    Fatigue    Fever    Flatulence/gas pain/belching    No more than normal    Food intolerance    Frequent urination    Headache disorder    from deyhdration?    Hearing impairment    hearing aids    Hearing loss    Dr. Perea    Heartburn    Hemorrhoids    High cholesterol    Indigestion    have no gallbladder    Irregular bowel habits    chron's    Itch of skin    all over/not severe but bothersome when skin very dry. Scratch & is inflammed    Kidney failure    mild    Leg swelling    ankles-once in a while    Loss of appetite    Mouth sores    fever blisters inside lips/tongue    Nausea    since January 2018-occasional    Night sweats    Osteoarthritis    hands, knees    Pain in joints    Painful swallowing    not painful but uncomfortable    Painful urination    Prediabetes    Problems with swallowing    Began taking Terbinafine March 22, 2023 for a toenail fungal infection. Began noticing irritated throat/top of gut were burning and swallowing becoming uncomfortable. Also experiencing weakness, dehydration, nausea, taste buds gone, loss of appetite    Rash    Shortness of breath    smoker    Skin blushing/flushing    years ago with active crohn's/before ileostomy    Sleep apnea    trouble sleeping since June 2023    Sleep disturbance    Sputum production    Stool incontinence     years ago    Stress    typical on occasion    Uncomfortable fullness after meals    Visual impairment    glasses    Vomiting    Wears glasses    Weight gain    Weight loss    dieted in 2022. lost about 8 lbs. Regained about 4.  taste buds destroyed (temporarily?) due to reaction to Terbinafine. Lost about 20 lbs. between late April & .    Wheezing     Past Surgical History:   Procedure Laterality Date    Appendectomy      removed during ileostomy?    Bowel resection      Cataract Left 2018    Cataract Right 10/2018    Cholecystectomy      Colectomy      Colonoscopy  ?    over 10 years    Correct bunion,simple      Hand/finger surgery unlisted Right     trigger thumb release    Hand/finger surgery unlisted Left 2014    A1 Pulley release    Part removal colon w end colostomy      colon resection w/ileostomy    Revision of ileostomy,simple         The family history and social history have been reviewed by me today.    Family History   Problem Relation Age of Onset    Diabetes Father         father    Cancer Father         lung, +smoker    Crohn's Disease Sister     Crohn's Disease Sister         Crohn's - her son also has Crohn's now, also my great niece has Crohn's    Diabetes Brother         only father    Crohn's Disease Brother             Cancer Brother         lung cancer, +smoker    Breast Cancer Maternal Grandmother 80            Crohn's Disease Nephew     Crohn's Disease Other     Crohn's Disease Brother              Social History     Socioeconomic History    Marital status:    Tobacco Use    Smoking status: Every Day     Current packs/day: 0.50     Average packs/day: 0.5 packs/day for 51.3 years (25.6 ttl pk-yrs)     Types: Cigarettes     Start date: 1973    Smokeless tobacco: Never    Tobacco comments:     tried Chantix three times-after about 2 months I became depressed and irritable   Vaping Use    Vaping status: Never Used    Substance and Sexual Activity    Alcohol use: Not Currently     Comment: very rarely consume alcohol    Drug use: Not Currently     Types: Cannabis    Sexual activity: Not Currently        Current Outpatient Medications:     potassium citrate (UROCIT-K 5) 5 MEQ (540 MG) Oral Tab CR, Take 1 tablet (5 mEq total) by mouth in the morning, at noon, and at bedtime., Disp: 270 tablet, Rfl: 5    HYDROcodone-acetaminophen (NORCO) 5-325 MG Oral Tab, Take 1 tablet by mouth every 6 (six) hours as needed for Pain., Disp: 30 tablet, Rfl: 0    HYDROcodone-acetaminophen 5-325 MG Oral Tab, Take 1-2 tablets by mouth every 4 (four) hours as needed for Pain., Disp: 20 tablet, Rfl: 0    PEG 3350-KCl-Na Bicarb-NaCl 420 g Oral Recon Soln, Take 4,000 mL by mouth As Directed., Disp: 1 each, Rfl: 0    ondansetron (ZOFRAN) 4 mg tablet, Take 1 tablet (4 mg total) by mouth every 8 (eight) hours as needed for Nausea., Disp: 90 tablet, Rfl: 0    methylPREDNISolone (MEDROL) 4 MG Oral Tablet Therapy Pack, As directed., Disp: 21 tablet, Rfl: 0    cephalexin 500 MG Oral Cap, TAKE ALL FOUR capsules 30-60 MINUTES prior TO THE procedure, Disp: , Rfl:     cyclobenzaprine 5 MG Oral Tab, Take 1 tablet (5 mg total) by mouth 2 (two) times daily as needed for Muscle spasms., Disp: , Rfl:     methylPREDNISolone (MEDROL) 4 MG Oral Tablet Therapy Pack, As directed., Disp: 1 each, Rfl: 0    potassium citrate-citric acid 1100-334 MG/5ML Oral Solution, Take 15 mL (30 mEq total) by mouth 3 (three) times daily with meals., Disp: 473 mL, Rfl: 11    escitalopram 5 MG Oral Tab, Take 1 tablet (5 mg total) by mouth daily., Disp: 30 tablet, Rfl: 1    Glucose Blood (ACCU-CHEK GUIDE) In Vitro Strip, Use 1 (one) new strip daily for blood glucose monitoring, Disp: 100 strip, Rfl: 3    hydroCHLOROthiazide 12.5 MG Oral Cap, Take 1 capsule (12.5 mg total) by mouth daily., Disp: 30 capsule, Rfl: 0    Accu-Chek FastClix Lancets Does not apply Misc, 1 Lancet by Finger stick route  2 (two) times daily., Disp: , Rfl:     ketoconazole 2 % External Cream, Apply 1 Application topically 2 (two) times daily. APPLY TO AFFECTED AREA, Disp: , Rfl:     clotrimazole-betamethasone 1-0.05 % External Cream, Apply to affected area BID for 2 weeks, Disp: 60 g, Rfl: 2    HYDROcodone-acetaminophen (NORCO) 5-325 MG Oral Tab, Take 1 tablet by mouth every 6 (six) hours as needed for Pain., Disp: 30 tablet, Rfl: 0    metRONIDAZOLE 0.75 % External Cream, apply TO AFFECTED AREA ON face EVERY DAY BEFORE NOON, Disp: , Rfl:     pimecrolimus 1 % External Cream, apply topically TWICE DAILY TO affected AREAS, Disp: , Rfl:     Sulfacetamide Sodium-Sulfur 10-5 % External Liquid, Apply 1 Application topically 2 (two) times daily., Disp: , Rfl:     triamcinolone 0.1 % External Cream, APPLY TO THE AFFECTED AREA(S) ON BACK TWICE DAILY FOR UP TO TWO WEEKS. AVOID face, armpits AND groin, Disp: , Rfl:     albuterol (VENTOLIN HFA) 108 (90 Base) MCG/ACT Inhalation Aero Soln, Inhale 2 puffs into the lungs every 6 (six) hours as needed for Wheezing., Disp: 1 each, Rfl: 3    Tiotropium Bromide Monohydrate (SPIRIVA RESPIMAT) 2.5 MCG/ACT Inhalation Aero Soln, Inhale 2 puffs into the lungs daily., Disp: 1 each, Rfl: 3    acetaminophen 500 MG Oral Tab, Take 1 tablet (500 mg total) by mouth every 6 (six) hours as needed for Pain., Disp: , Rfl:      Review of Systems  A 10 point review of systems was performed and negative unless otherwise documented per HPI.     Physical Findings   /78 (BP Location: Right arm, Patient Position: Sitting)   Pulse 87   Temp 96.7 °F (35.9 °C) (Temporal)   LMP  (LMP Unknown)   SpO2 100%   Physical Exam  Vitals and nursing note reviewed. Exam conducted with a chaperone present.   Constitutional:       General: She is not in acute distress.  HENT:      Head: Normocephalic and atraumatic.      Mouth/Throat:      Mouth: Mucous membranes are moist.   Cardiovascular:      Rate and Rhythm: Normal rate and  regular rhythm.   Pulmonary:      Effort: Pulmonary effort is normal.   Abdominal:      General: There is no distension.      Palpations: Abdomen is soft.      Tenderness: There is no abdominal tenderness.      Hernia: A hernia (There is a parastomal hernia which is at least partially reducible and nontender to palpation) is present.      Comments: Well-healed midline and right upper quadrant subcostal scars.  Right sided ileostomy with appliance in place.   Musculoskeletal:         General: No deformity.   Skin:     General: Skin is warm and dry.   Neurological:      General: No focal deficit present.      Mental Status: She is alert.   Psychiatric:         Mood and Affect: Mood normal.     I have personally reviewed the imaging of patient's most recent CT scans of the abdomen and pelvis. CT scan on 10/2/2024 shows a parastomal hernia containing small bowel.  This has a similar appearance on my review of the imaging compared to her most recent prior CT scan on 8/14/2024.      Assessment   1. Parastomal hernia without obstruction or gangrene    2. Crohn's disease of small intestine without complication (HCC)      This is a very nice 74-year-old female with history of Crohn's disease status post remote history of open colectomy with creation of end ileostomy in 1979 who returns for follow-up today in regards to a parastomal hernia.    Patient was scheduled to see me electively but ended up presenting to the emergency room on 10/2/2024 with left flank pain.  She was found to have a left-sided kidney stone and was discharged home.    Patient has noted a bulge around her stoma for the last 4-5 years.  She complains of pain that radiates from her navel to her pubic area with coughing.  Patient is a smoker.  She denies any pain around the stoma.  She has been eating less lately and has had less output from the ileostomy.  She has some chronic mild nausea and has felt weak for years now.  She has lost around 25 pounds over  the last 18 months.  No issues with pouching.  No known history of prior small bowel obstruction.    Patient is currently being treated with Inflectra and follows with [facility] gastroenterology.  She is scheduled to have an ileoscopy on 10/16/2024 with Dr. Lewis.  Other prior surgery includes a small bowel resection, cholecystectomy and appendectomy in the 80's.  No history of prior hernia repair.    Exam does confirm the presence of a parastomal hernia which is nontender to palpation and partially reducible. CT scan on 10/2/2024 shows a parastomal hernia containing small bowel.  This has a similar appearance on my review of the imaging compared to her most recent prior CT scan on 8/14/2024.     Plan   I discussed options with the patient of watchful observation versus planning for elective parastomal hernia repair.  Both options have some degree of risks.    Risks of watchful observation would be bowel incarceration, obstruction or even strangulation.  These complications could require urgent/emergent surgical intervention if they were to happen.  That being said, this is a chronic hernia and I think this risk is fairly low.    Risks of parastomal hernia repair includes but not limited to pain, bleeding, infection, possibility of damage to the bowel, and high likelihood of hernia recurrence.  This patient in particular is at somewhat higher risk of recurrent hernia given the fact that she smokes and has been losing weight lately.    Patient expressed understanding of the options and wished to proceed with watchful observation for now.  Patient does have vague abdominal symptoms of chronic nausea, poor appetite and weight loss.  It is unclear if any of the symptoms are related to the parastomal hernia.  I will continue to coordinate care with patient's gastroenterologist, Dr. Lewis.  If Dr. Lewis feels patient's Crohn's is optimized and patient continues to have vague abdominal symptoms suggestive of  obstruction, we can re-entertain the idea of elective parastomal hernia repair.  Otherwise, I would favor watchful observation which is in line with the patient's wishes.    Patient and I agreed for ongoing follow-up in around 3 months time.  Patient is welcome to contact me in the meantime with any new or worsening symptoms.     No orders of the defined types were placed in this encounter.      Imaging & Referrals   None    Follow Up  No follow-ups on file.    Ghassan Navarro MD

## 2024-10-17 ENCOUNTER — TELEPHONE (OUTPATIENT)
Dept: SURGERY | Facility: CLINIC | Age: 74
End: 2024-10-17

## 2024-10-17 RX ORDER — ESCITALOPRAM OXALATE 5 MG/1
5 TABLET ORAL DAILY
Qty: 90 TABLET | Refills: 3 | Status: SHIPPED | OUTPATIENT
Start: 2024-10-17

## 2024-10-17 NOTE — TELEPHONE ENCOUNTER
Refill passed per [facility] protocol.  Requested Prescriptions   Pending Prescriptions Disp Refills    ESCITALOPRAM 5 MG Oral Tab [Pharmacy Med Name: escitalopram 5 mg tablet] 30 tablet 1     Sig: TAKE ONE TABLET BY MOUTH DAILY       Psychiatric Non-Scheduled (Anti-Anxiety) Passed - 10/17/2024  8:28 AM        Passed - In person appointment or virtual visit in the past 6 mos or appointment in next 3 mos     Recent Outpatient Visits              2 days ago Kidney stone    [facility] Joao Marie MD    Office Visit    3 days ago Parastomal hernia without obstruction or gangrene    [facility] Ghassan Navarro MD    Office Visit    6 days ago Anxiety and depression    [address] Mahi Valentino MD    Telemedicine    1 month ago Esophageal dysphagia    [facility],  [facility] Nimo Guadalupe APRN    Office Visit    1 month ago Neck pain    [address] Edie Childress APRN    Office Visit          Future Appointments         Provider Department Appt Notes    In 3 months Ghassan Navarro MD [facility] [place] 3month f/u- parastomal hernia    In 6 months [facility] CT MAIN [address] This is a re-schedule for a 6 month visit for April 15, 2025 after doctor visit on Oct.     In 6 months Joao Marie MD [facility] Follow-up visit for April 15, 2025 C-SCAN/Dr. Gant                    Passed - Depression Screening completed within the past 12 months           Future Appointments         Provider Department Appt Notes    In 3 months Ghassan Navarro MD [facility] [place] 3month f/u- parastomal hernia    In 6 months [facility] CT MAIN [address] This is a re-schedule for a 6 month visit for April 15, 2025 after  doctor visit on Oct.     In 6 months Joao Marie MD [facility] Follow-up visit for April 15, 2025 C-SCAN/Dr. Gant          Recent Outpatient Visits              2 days ago Kidney stone    [facility] Joao Marie MD    Office Visit    3 days ago Parastomal hernia without obstruction or gangrene    [facility] Ghassan Navarro MD    Office Visit    6 days ago Anxiety and depression    [address] Mahi Valentino MD    Telemedicine    1 month ago Esophageal dysphagia    [facility] Gastroenterology,  [facility] Nimo Guadalupe APRN    Office Visit    1 month ago Neck pain    [address] Edie Childress APRN    Office Visit

## 2024-10-22 ENCOUNTER — APPOINTMENT (OUTPATIENT)
Dept: HEMATOLOGY/ONCOLOGY | Facility: HOSPITAL | Age: 74
End: 2024-10-22
Payer: COMMERCIAL

## 2024-10-25 ENCOUNTER — PATIENT MESSAGE (OUTPATIENT)
Dept: INTERNAL MEDICINE CLINIC | Facility: CLINIC | Age: 74
End: 2024-10-25

## 2024-10-25 DIAGNOSIS — R25.1 TREMOR: Primary | ICD-10-CM

## 2024-10-25 DIAGNOSIS — B36.9 FUNGAL RASH OF TORSO: ICD-10-CM

## 2024-10-26 NOTE — TELEPHONE ENCOUNTER
LOV 10/11/24 telemedicine    Patient requesting a referral to neurologist to rule out parkinsons   Ok for referral?

## 2024-10-28 RX ORDER — CLOTRIMAZOLE AND BETAMETHASONE DIPROPIONATE 10; .64 MG/G; MG/G
CREAM TOPICAL
Qty: 60 G | Refills: 2 | Status: SHIPPED | OUTPATIENT
Start: 2024-10-28

## 2024-10-28 NOTE — TELEPHONE ENCOUNTER
Please review. Protocol Failed; No Protocol    Requested Prescriptions   Pending Prescriptions Disp Refills    CLOTRIMAZOLE-BETAMETHASONE 1-0.05 % External Cream [Pharmacy Med Name: clotrimazole-betamethasone 1 %-0.05 % topical cream] 60 g 2     Sig: APPLY TO THE AFFECTED AREA(S) TWICE DAILY FOR TWO WEEKS       There is no refill protocol information for this order            Future Appointments         Provider Department Appt Notes    In 3 weeks INFUSION ECC NURSE [facility] Gastroenterology,  [facility] B&B(1)- Skyrizi loading dose #2- 600mg at week 0,4 and 8 for CD. AW    In 1 month INFUSION ECC NURSE [facility]enter[facility],  [facility] B&B(1)- Skyrizi loading dose #3- 600mg at week 0,4 and 8 for CD. AW    In 2 months Narda Segundo MD [facility]     In 2 months Ghassan Navarro MD [facility] 3month f/u- parastomal hernia    In 5 months E[facility] CT MAIN [address] This is a re-schedule for a 6 month visit for April 15, 2025 after doctor visit on Oct.     In 5 months Joao Marie MD [facility] Follow-up visit for April 15, 2025 C-SCAN/Dr. Gant          Recent Outpatient Visits              1 week ago Crohn's disease of small intestine with complication (HCC)    [facility]  [facility]    Nurse Only    1 week ago Kidney stone    [facility] Joao Marie MD    Office Visit    2 weeks ago Parastomal hernia without obstruction or gangrene    [facility] Ghassan Navarro MD    Office Visit    2 weeks ago Anxiety and depression    [address] Mahi Valentino MD    Telemedicine    1 month ago Esophageal dysphagia    [facility]  [facility] Nimo Guadalupe APRN    Office Visit

## 2024-12-10 ENCOUNTER — TELEPHONE (OUTPATIENT)
Dept: SURGERY | Facility: CLINIC | Age: 74
End: 2024-12-10

## 2024-12-10 NOTE — TELEPHONE ENCOUNTER
Patient has questions regarding a letter that she received through mail  recently about her ct scan order & insurance .Please advise

## 2024-12-12 NOTE — TELEPHONE ENCOUNTER
Patient has questions regarding a letter that she received through mail recently about her ct scan order & insurance .Please advise She has called again today. She previously called in 12/10.

## 2024-12-13 ENCOUNTER — TELEPHONE (OUTPATIENT)
Dept: SURGERY | Facility: CLINIC | Age: 74
End: 2024-12-13

## 2024-12-13 NOTE — TELEPHONE ENCOUNTER
Called pt to discuss below.   Pt had questions regarding PCR test that was sent out on 10/15.  All questions answered.  This encounter is completed.

## 2025-01-06 ENCOUNTER — OFFICE VISIT (OUTPATIENT)
Dept: NEUROLOGY | Facility: CLINIC | Age: 75
End: 2025-01-06
Payer: COMMERCIAL

## 2025-01-06 VITALS
RESPIRATION RATE: 18 BRPM | SYSTOLIC BLOOD PRESSURE: 108 MMHG | DIASTOLIC BLOOD PRESSURE: 58 MMHG | BODY MASS INDEX: 23 KG/M2 | HEART RATE: 55 BPM | WEIGHT: 121 LBS

## 2025-01-06 DIAGNOSIS — R53.1 WEAKNESS: Primary | ICD-10-CM

## 2025-01-06 PROCEDURE — 3074F SYST BP LT 130 MM HG: CPT | Performed by: OTHER

## 2025-01-06 PROCEDURE — 99205 OFFICE O/P NEW HI 60 MIN: CPT | Performed by: OTHER

## 2025-01-06 PROCEDURE — 3078F DIAST BP <80 MM HG: CPT | Performed by: OTHER

## 2025-01-06 NOTE — PROGRESS NOTES
HPI:    Patient ID: Mae Mark is a 74 year old female.    HPI  I am seeing her for the first time today, have not seen her previously.   She told me that she had tremors in both hands for many years now, she does not recall exactly when the tremor started but she did start noticing them more prominently over the past 2 years.  She is right-handed, she believes the tremor is slightly worse in her right hand and its mostly an action tremor and does not occur at rest.  She is worried because her mother was diagnosed with Parkinson's disease so she wants to know if she has it.  She does not notice any tremor in her head, in her jaw or in her voice.  She also does not notice any tremors in her lower extremities.      Her hand tremor is not disabling but she is just worried about it.  It mostly gets in the way of fine motor skills which she is able to complete all ADLs by herself.  She mentioned that she would notice it when she holds a spoon to eat soup for example, she describes a jerky character of this tremor, of note she has been maintained on Norco and when I asked her the reason why she is on Norco she mentioned that she uses it for anxiety.  She denied pain to me today    She has been diagnosed with Crohn's disease in  and had surgery for it, she had a flare of her Crohn's more recently and was started on an IV infusion that she does not recall the name, she mentioned that her tremors got worse after the infusion was started and improved after the infusion was stopped.  She did lose about 20 pounds with a last Crohn's disease flare.    She also has a diagnosis of rheumatoid arthritis.   She has chronic kidney disease and her last creatinine was 1.55, GFR of 35 (checked in 2024)    She has 1  brother, 1 alive brother and 1 sister.  She is not aware of any siblings with tremors.  Her father did not have tremors.  Her mother was diagnosed with Parkinson's disease.  She does not have any  kids    HISTORY:  Past Medical History:    Abdominal distention    Abdominal hernia    2021    Abdominal pain    years ago before ostomy    Anemia    occasional in the past    Anxiety    Arthritis    Asthma (HCC)    Atypical mole    Back pain    Bad breath    mouth hot/dry    Belching    Some liquids have begun causing belching. Sodas, sometimes water    Black stools    years ago    Bleeding nose    Bloating    Blood in the stool    years ago    Blood in urine    Blurred vision    Body piercing    Calculus of kidney    Have just been told I have kidney stones by [facility]    Change in hair    hair drier than usual    Chest pain on exertion    pressure    Chronic cough    Crohn's disease (HCC)    Diabetes mellitus (HCC)    Diarrhea, unspecified    Dizziness    still not steady    Easy bruising    Fatigue    Fever    Flatulence/gas pain/belching    No more than normal    Food intolerance    Frequent urination    Headache disorder    from deyhdration?    Hearing impairment    hearing aids    Hearing loss    Dr. Perea    Heartburn    Hemorrhoids    High cholesterol    Indigestion    have no gallbladder    Irregular bowel habits    chron's    Itch of skin    all over/not severe but bothersome when skin very dry. Scratch & is inflammed    Kidney failure    mild    Leg swelling    ankles-once in a while    Loss of appetite    Mouth sores    fever blisters inside lips/tongue    Nausea    since January 2018-occasional    Night sweats    Osteoarthritis    hands, knees    Pain in joints    Painful swallowing    not painful but uncomfortable    Painful urination    Prediabetes    Problems with swallowing    Began taking Terbinafine March 22, 2023 for a toenail fungal infection. Began noticing irritated throat/top of gut were burning and swallowing becoming uncomfortable. Also experiencing weakness, dehydration, nausea, taste buds gone, loss of appetite    Rash    Shortness of breath    smoker    Skin  blushing/flushing    years ago with active crohn's/before ileostomy    Sleep apnea    trouble sleeping since 2023    Sleep disturbance    Sputum production    Stool incontinence    years ago    Stress    typical on occasion    Uncomfortable fullness after meals    Visual impairment    glasses    Vomiting    Wears glasses    Weight gain    Weight loss    dieted in 2022. lost about 8 lbs. Regained about 4.  taste buds destroyed (temporarily?) due to reaction to Terbinafine. Lost about 20 lbs. between late April & .    Wheezing      Past Surgical History:   Procedure Laterality Date    Appendectomy      removed during ileostomy?    Bowel resection      Cataract Left 2018    Cataract Right 10/2018    Cholecystectomy      Colectomy      Colonoscopy  ?    over 10 years    Correct bunion,simple      Hand/finger surgery unlisted Right     trigger thumb release    Hand/finger surgery unlisted Left 2014    A1 Pulley release    Part removal colon w end colostomy      colon resection w/ileostomy    Revision of ileostomy,simple        Family History   Problem Relation Age of Onset    Diabetes Father         father    Cancer Father         lung, +smoker    Crohn's Disease Sister     Crohn's Disease Sister         Crohn's - her son also has Crohn's now, also my great niece has Crohn's    Diabetes Brother         only father    Crohn's Disease Brother             Cancer Brother         lung cancer, +smoker    Breast Cancer Maternal Grandmother 80            Crohn's Disease Nephew     Crohn's Disease Other     Crohn's Disease Brother               Social History     Socioeconomic History    Marital status:    Tobacco Use    Smoking status: Every Day     Current packs/day: 0.50     Average packs/day: 0.5 packs/day for 51.5 years (25.8 ttl pk-yrs)     Types: Cigarettes     Start date: 1973    Smokeless tobacco: Never    Tobacco comments:     tried Chantix  three times-after about 2 months I became depressed and irritable   Vaping Use    Vaping status: Never Used   Substance and Sexual Activity    Alcohol use: Not Currently     Comment: very rarely consume alcohol    Drug use: Not Currently     Types: Cannabis     Comment: occasionally    Sexual activity: Not Currently   Social History Narrative    , no children.  Retired, previously worked in administration.        Review of Systems  Negative except as in HPI       Current Outpatient Medications   Medication Sig Dispense Refill    Omeprazole 40 MG Oral Capsule Delayed Release Take 1 capsule (40 mg total) by mouth daily. 90 capsule 3    Risankizumab-rzaa (SKYRIZI) 360 MG/2.4ML Subcutaneous Solution Cartridge Inject 2.4 mL into the skin Every 8 (eight) weeks. 2.4 mL 5    clotrimazole-betamethasone 1-0.05 % External Cream APPLY TO THE AFFECTED AREA(S) TWICE DAILY FOR TWO WEEKS 60 g 2    escitalopram 5 MG Oral Tab Take 1 tablet (5 mg total) by mouth daily. 90 tablet 3    potassium citrate (UROCIT-K 5) 5 MEQ (540 MG) Oral Tab CR Take 1 tablet (5 mEq total) by mouth in the morning, at noon, and at bedtime. 270 tablet 5    HYDROcodone-acetaminophen (NORCO) 5-325 MG Oral Tab Take 1 tablet by mouth every 6 (six) hours as needed for Pain. 30 tablet 0    HYDROcodone-acetaminophen 5-325 MG Oral Tab Take 1-2 tablets by mouth every 4 (four) hours as needed for Pain. 20 tablet 0    ondansetron (ZOFRAN) 4 mg tablet Take 1 tablet (4 mg total) by mouth every 8 (eight) hours as needed for Nausea. 90 tablet 0    potassium citrate-citric acid 1100-334 MG/5ML Oral Solution Take 15 mL (30 mEq total) by mouth 3 (three) times daily with meals. 473 mL 11    Glucose Blood (ACCU-CHEK GUIDE) In Vitro Strip Use 1 (one) new strip daily for blood glucose monitoring 100 strip 3    Accu-Chek FastClix Lancets Does not apply Misc 1 Lancet by Finger stick route 2 (two) times daily.      ketoconazole 2 % External Cream Apply 1 Application topically  2 (two) times daily. APPLY TO AFFECTED AREA      HYDROcodone-acetaminophen (NORCO) 5-325 MG Oral Tab Take 1 tablet by mouth every 6 (six) hours as needed for Pain. 30 tablet 0    metRONIDAZOLE 0.75 % External Cream apply TO AFFECTED AREA ON face EVERY DAY BEFORE NOON      pimecrolimus 1 % External Cream apply topically TWICE DAILY TO affected AREAS      Sulfacetamide Sodium-Sulfur 10-5 % External Liquid Apply 1 Application topically 2 (two) times daily.      triamcinolone 0.1 % External Cream APPLY TO THE AFFECTED AREA(S) ON BACK TWICE DAILY FOR UP TO TWO WEEKS. AVOID face, armpits AND groin      albuterol (VENTOLIN HFA) 108 (90 Base) MCG/ACT Inhalation Aero Soln Inhale 2 puffs into the lungs every 6 (six) hours as needed for Wheezing. 1 each 3    Tiotropium Bromide Monohydrate (SPIRIVA RESPIMAT) 2.5 MCG/ACT Inhalation Aero Soln Inhale 2 puffs into the lungs daily. 1 each 3    acetaminophen 500 MG Oral Tab Take 1 tablet (500 mg total) by mouth every 6 (six) hours as needed for Pain.      PEG 3350-KCl-Na Bicarb-NaCl 420 g Oral Recon Soln Take 4,000 mL by mouth As Directed. 1 each 0    methylPREDNISolone (MEDROL) 4 MG Oral Tablet Therapy Pack As directed. (Patient not taking: Reported on 1/6/2025) 21 tablet 0    cephalexin 500 MG Oral Cap TAKE ALL FOUR capsules 30-60 MINUTES prior TO THE procedure      cyclobenzaprine 5 MG Oral Tab Take 1 tablet (5 mg total) by mouth 2 (two) times daily as needed for Muscle spasms.      methylPREDNISolone (MEDROL) 4 MG Oral Tablet Therapy Pack As directed. 1 each 0    hydroCHLOROthiazide 12.5 MG Oral Cap Take 1 capsule (12.5 mg total) by mouth daily. 30 capsule 0     Allergies:Allergies[1]  PHYSICAL EXAM:   Physical Exam    General Appearance: Well nourished, well developed, no apparent distress.     HEENT: Normocephalic and atraumatic. Normal sclera.  Neck: Normal range of motion. Neck supple.  Cardiovascular: Normal rate, regular rhythm   Pulmonary/Chest: Effort normal     Mental  Status Exam: Patient is awake, alert and oriented to person, place and time with normal memory, fund of knowledge, attention/concentration and language.    Cranial Nerves:  funduscopic exam is normal, cataract surgeries bilaterally  II: Visual fields: normal to confrontation test  III: Pupils: equal, round, reactive to light, NO APD  III,IV,VI: Normal EOM. Saccades  V: Facial sensation: intact  VII: Facial strength: Normal  VIII: Hearing: Decreased bilaterally  IX: Palate elevates symmetrically  XI: Shoulder shrug: symmetric  XII: Tongue protrudes in midline    Motor Exam: Normal tone. Strength is  5 out of 5 in proximal and distal muscles of upper and lower extremities  Movement disorders exam:  There was no bradykinesia or rigidity.  She has arthritic changes in her hands.  No tremors at rest.  She has a postural tremor that is grade 1.5 in her right hand and grade 1 in her left hand most obvious in wing beating position.  She has grade 1 postural tremor in her right hand with arm extended and no tremor in her left hand with arms extended.   DTR: 3+ and symmetric.  Positive Lorena sign on the left, positive cross adductor sign bilaterally.  Downgoing toes bilaterally    Sensory: Normal sensation to superficial touch and vibration in all extremities    Coordination: Normal finger-nose-finger test     Gait: Stands up and walk unassisted.  No shuffling no freezing of gait.  Normal arm swings bilaterally.  Normal stride.  Normal postural reflexes    TESTS/IMAGING:         ASSESSMENT/PLAN:     Encounter Diagnosis   Name Primary?    Weakness Yes       Orders Placed This Encounter   Procedures    Vitamin B12    Folic Acid Serum       Essential tremor:  I had a long discussion with her about her diagnosis.  She has no family history of essential tremor and her tremor is very mild in posture in bilateral upper extremities worse in her right hand.  There was no evidence of rest tremor bradykinesia or rigidity to suggest  Parkinson's disease, bradykinesia assessment was limited by arthritis in her hand, she has history of rheumatoid arthritis  Her neurological exam was also significant for hyperreflexia with positive cross adductor sign and Lorena sign on the left.  There was no spasticity.  I will check MRI cervical spine  Given her Crohn's disease I will also check vitamin B12 and folic acid levels.  I I told her that Norco could cause myoclonus and exacerbate tremors.  She told me she currently uses it for anxiety, I advised her she should cut down on it and potentially stop it.  I will see her if results of her testing are abnormal, advised her to return if her tremors worsen to start her on medications.  Discussed few options of medications with her      Please do not hesitate to call if you have any questions.   I will continue to follow with you and will make further recommendations based on their progress.     60 total minutes spent with patient >50% of visit was spent in counseling and coordination of care      Meds This Visit:  Requested Prescriptions      No prescriptions requested or ordered in this encounter       Imaging & Referrals:  MRI SPINE CERVICAL (CPT=72141)     ID#[id number]         [1]   Allergies  Allergen Reactions    Mold Spores ASTHMA, ITCHING, Runny nose and WHEEZING

## 2025-01-06 NOTE — PROGRESS NOTES
Hand tremors started years ago. Have gotten worse last couple years. No sure if from medications or her chrones. Mother had parkinsons.

## 2025-01-06 NOTE — PATIENT INSTRUCTIONS
1- Yo have mild essential tremor (another name for it is familiar tremor despite the fact that you don't have it in the family)  2- I don't see evidence of Parkinson's Disease  3- I will check blood work  4- I will check MRI cervical spine (you have hyper-reflexia)  5- See me as needed           Refill policies:    Allow 2-3 business days for refills; controlled substances may take longer.  Contact your pharmacy at least 5 days prior to running out of medication and have them send an electronic request or submit request through the “request refill” option in your [employer] account.  Refills are not addressed on weekends; covering physicians do not authorize routine medications on weekends.  No narcotics or controlled substances are refilled after noon on Fridays or by on call physicians.  By law, narcotics must be electronically prescribed.  A 30 day supply with no refills is the maximum allowed.  If your prescription is due for a refill, you may be due for a follow up appointment.  To best provide you care, patients receiving routine medications need to be seen at least once a year.  Patients receiving narcotic/controlled substance medications need to be seen at least once every 3 months.  In the event that your preferred pharmacy does not have the requested medication in stock (e.g. Backordered), it is your responsibility to find another pharmacy that has the requested medication available.  We will gladly send a new prescription to that pharmacy at your request.    Scheduling Tests:    If your physician has ordered radiology tests such as MRI or CT scans, please contact Central Scheduling at [phone] right away to schedule the test.  Once scheduled, the [facility] Centralized Referral Team will work with your insurance carrier to obtain pre-certification or prior authorization.  Depending on your insurance carrier, approval may take 3-10 days.  It is highly recommended patients assure they have received an  authorization before having a test performed.  If test is done without insurance authorization, patient may be responsible for the entire amount billed.      Precertification and Prior Authorizations:  If your physician has recommended that you have a procedure or additional testing performed the [facility] Centralized Referral Team will contact your insurance carrier to obtain pre-certification or prior authorization.    You are strongly encouraged to contact your insurance carrier to verify that your procedure/test has been approved and is a COVERED benefit.  Although the [facility] Centralized Referral Team does its due diligence, the insurance carrier gives the disclaimer that \"Although the procedure is authorized, this does not guarantee payment.\"    Ultimately the patient is responsible for payment.   Thank you for your understanding in this matter.  Paperwork Completion:  If you require FMLA or disability paperwork for your recovery, please make sure to either drop it off or have it faxed to our office at [phone]. Be sure the form has your name and date of birth on it.  The form will be faxed to our Forms Department and they will complete it for you.  There is a 25$ fee for all forms that need to be filled out.  Please be aware there is a 10-14 day turnaround time.  You will need to sign a release of information (ANA) form if your paperwork does not come with one.  You may call the Forms Department with any questions at [phone].  Their fax number is [phone].

## 2025-01-10 ENCOUNTER — LAB ENCOUNTER (OUTPATIENT)
Dept: LAB | Age: 75
End: 2025-01-10
Attending: INTERNAL MEDICINE
Payer: COMMERCIAL

## 2025-01-10 DIAGNOSIS — N18.32 STAGE 3B CHRONIC KIDNEY DISEASE (HCC): ICD-10-CM

## 2025-01-10 DIAGNOSIS — R53.1 WEAKNESS: ICD-10-CM

## 2025-01-10 DIAGNOSIS — R25.2 LEG CRAMPS: ICD-10-CM

## 2025-01-10 DIAGNOSIS — E11.9 DIET-CONTROLLED DIABETES MELLITUS (HCC): ICD-10-CM

## 2025-01-10 LAB
ALBUMIN SERPL-MCNC: 4.2 G/DL (ref 3.2–4.8)
ALBUMIN/GLOB SERPL: 1.2 {RATIO} (ref 1–2)
ALP LIVER SERPL-CCNC: 97 U/L
ALT SERPL-CCNC: 10 U/L
ANION GAP SERPL CALC-SCNC: 8 MMOL/L (ref 0–18)
AST SERPL-CCNC: 24 U/L (ref ?–34)
BILIRUB SERPL-MCNC: 0.4 MG/DL (ref 0.2–1.1)
BUN BLD-MCNC: 21 MG/DL (ref 9–23)
CALCIUM BLD-MCNC: 9.7 MG/DL (ref 8.7–10.4)
CHLORIDE SERPL-SCNC: 102 MMOL/L (ref 98–112)
CO2 SERPL-SCNC: 25 MMOL/L (ref 21–32)
CREAT BLD-MCNC: 1.3 MG/DL
EGFRCR SERPLBLD CKD-EPI 2021: 43 ML/MIN/1.73M2 (ref 60–?)
EST. AVERAGE GLUCOSE BLD GHB EST-MCNC: 117 MG/DL (ref 68–126)
FASTING STATUS PATIENT QL REPORTED: YES
GLOBULIN PLAS-MCNC: 3.5 G/DL (ref 2–3.5)
GLUCOSE BLD-MCNC: 92 MG/DL (ref 70–99)
HBA1C MFR BLD: 5.7 % (ref ?–5.7)
MAGNESIUM SERPL-MCNC: 1.4 MG/DL (ref 1.6–2.6)
OSMOLALITY SERPL CALC.SUM OF ELEC: 283 MOSM/KG (ref 275–295)
POTASSIUM SERPL-SCNC: 5.3 MMOL/L (ref 3.5–5.1)
PROT SERPL-MCNC: 7.7 G/DL (ref 5.7–8.2)
SODIUM SERPL-SCNC: 135 MMOL/L (ref 136–145)

## 2025-01-10 PROCEDURE — 82746 ASSAY OF FOLIC ACID SERUM: CPT | Performed by: OTHER

## 2025-01-10 PROCEDURE — 82607 VITAMIN B-12: CPT | Performed by: OTHER

## 2025-01-11 ENCOUNTER — TELEPHONE (OUTPATIENT)
Dept: INTERNAL MEDICINE CLINIC | Facility: CLINIC | Age: 75
End: 2025-01-11

## 2025-01-11 DIAGNOSIS — E87.5 HYPERKALEMIA: Primary | ICD-10-CM

## 2025-01-11 LAB
FOLATE SERPL-MCNC: 6.1 NG/ML (ref 5.4–?)
VIT B12 SERPL-MCNC: 257 PG/ML (ref 211–911)

## 2025-01-24 ENCOUNTER — LAB ENCOUNTER (OUTPATIENT)
Dept: LAB | Age: 75
End: 2025-01-24
Attending: INTERNAL MEDICINE
Payer: COMMERCIAL

## 2025-01-24 DIAGNOSIS — E87.5 HYPERKALEMIA: ICD-10-CM

## 2025-01-24 LAB
ANION GAP SERPL CALC-SCNC: 12 MMOL/L (ref 0–18)
BUN BLD-MCNC: 22 MG/DL (ref 9–23)
CALCIUM BLD-MCNC: 9.2 MG/DL (ref 8.7–10.6)
CHLORIDE SERPL-SCNC: 105 MMOL/L (ref 98–112)
CO2 SERPL-SCNC: 22 MMOL/L (ref 21–32)
CREAT BLD-MCNC: 1.18 MG/DL
EGFRCR SERPLBLD CKD-EPI 2021: 49 ML/MIN/1.73M2 (ref 60–?)
FASTING STATUS PATIENT QL REPORTED: YES
GLUCOSE BLD-MCNC: 95 MG/DL (ref 70–99)
OSMOLALITY SERPL CALC.SUM OF ELEC: 291 MOSM/KG (ref 275–295)
POTASSIUM SERPL-SCNC: 5.1 MMOL/L (ref 3.5–5.1)
SODIUM SERPL-SCNC: 139 MMOL/L (ref 136–145)

## 2025-01-24 PROCEDURE — 80048 BASIC METABOLIC PNL TOTAL CA: CPT

## 2025-01-24 PROCEDURE — 36415 COLL VENOUS BLD VENIPUNCTURE: CPT

## 2025-02-28 ENCOUNTER — HOSPITAL ENCOUNTER (OUTPATIENT)
Dept: MRI IMAGING | Facility: HOSPITAL | Age: 75
Discharge: HOME | End: 2025-02-28
Attending: Other
Payer: COMMERCIAL

## 2025-02-28 DIAGNOSIS — R53.1 WEAKNESS: ICD-10-CM

## 2025-02-28 PROCEDURE — 72141 MRI NECK SPINE W/O DYE: CPT | Performed by: OTHER

## 2025-03-04 ENCOUNTER — TELEPHONE (OUTPATIENT)
Dept: INTERNAL MEDICINE CLINIC | Facility: CLINIC | Age: 75
End: 2025-03-04

## 2025-03-04 NOTE — TELEPHONE ENCOUNTER
Reviewed patient chart, appropriate for appointment  Per mychart encounter 2/19 chronic issue     Future Appointments   Date Time Provider Department Center   3/7/2025 10:00 AM Edie Childress, [facility] [address] [facility]   3/10/2025  9:45 AM Ghassan Navarro MD EMGGENS[facility] [id number]   4/15/2025 10:30 AM  CT [facility]IN RM[facility]  [facility]   4/16/2025  1:00 PM Joao Marie MD [id number] EC Nap 4

## 2025-03-07 ENCOUNTER — TELEPHONE (OUTPATIENT)
Dept: NEUROLOGY | Facility: CLINIC | Age: 75
End: 2025-03-07

## 2025-03-07 ENCOUNTER — LAB ENCOUNTER (OUTPATIENT)
Dept: LAB | Age: 75
End: 2025-03-07
Payer: COMMERCIAL

## 2025-03-07 ENCOUNTER — OFFICE VISIT (OUTPATIENT)
Dept: INTERNAL MEDICINE CLINIC | Facility: CLINIC | Age: 75
End: 2025-03-07
Payer: COMMERCIAL

## 2025-03-07 VITALS
BODY MASS INDEX: 23.22 KG/M2 | HEART RATE: 60 BPM | OXYGEN SATURATION: 100 % | DIASTOLIC BLOOD PRESSURE: 60 MMHG | SYSTOLIC BLOOD PRESSURE: 110 MMHG | WEIGHT: 123 LBS | TEMPERATURE: 97 F | RESPIRATION RATE: 21 BRPM | HEIGHT: 61 IN

## 2025-03-07 DIAGNOSIS — M51.369 DEGENERATION OF INTERVERTEBRAL DISC OF LUMBAR REGION, UNSPECIFIED WHETHER PAIN PRESENT: ICD-10-CM

## 2025-03-07 DIAGNOSIS — M25.522 ARTHRALGIA OF BOTH ELBOWS: ICD-10-CM

## 2025-03-07 DIAGNOSIS — R53.1 WEAKNESS: Primary | ICD-10-CM

## 2025-03-07 DIAGNOSIS — M25.521 ARTHRALGIA OF BOTH ELBOWS: ICD-10-CM

## 2025-03-07 DIAGNOSIS — R53.83 OTHER FATIGUE: Primary | ICD-10-CM

## 2025-03-07 DIAGNOSIS — E53.8 B12 DEFICIENCY: ICD-10-CM

## 2025-03-07 DIAGNOSIS — K50.00 CROHN'S DISEASE OF SMALL INTESTINE WITHOUT COMPLICATION (HCC): ICD-10-CM

## 2025-03-07 DIAGNOSIS — R53.83 OTHER FATIGUE: ICD-10-CM

## 2025-03-07 LAB
ALBUMIN SERPL-MCNC: 4.8 G/DL (ref 3.2–4.8)
ALBUMIN/GLOB SERPL: 1.4 {RATIO} (ref 1–2)
ALP LIVER SERPL-CCNC: 106 U/L
ALT SERPL-CCNC: 9 U/L
ANION GAP SERPL CALC-SCNC: 8 MMOL/L (ref 0–18)
AST SERPL-CCNC: 20 U/L (ref ?–34)
BILIRUB SERPL-MCNC: 0.3 MG/DL (ref 0.2–1.1)
BUN BLD-MCNC: 21 MG/DL (ref 9–23)
CALCIUM BLD-MCNC: 9.9 MG/DL (ref 8.7–10.6)
CHLORIDE SERPL-SCNC: 103 MMOL/L (ref 98–112)
CO2 SERPL-SCNC: 27 MMOL/L (ref 21–32)
CREAT BLD-MCNC: 1.14 MG/DL
DEPRECATED HBV CORE AB SER IA-ACNC: 14 NG/ML
EGFRCR SERPLBLD CKD-EPI 2021: 51 ML/MIN/1.73M2 (ref 60–?)
FASTING STATUS PATIENT QL REPORTED: YES
GLOBULIN PLAS-MCNC: 3.4 G/DL (ref 2–3.5)
GLUCOSE BLD-MCNC: 85 MG/DL (ref 70–99)
IRON SATN MFR SERPL: 6 %
IRON SERPL-MCNC: 27 UG/DL
OSMOLALITY SERPL CALC.SUM OF ELEC: 288 MOSM/KG (ref 275–295)
POTASSIUM SERPL-SCNC: 5.1 MMOL/L (ref 3.5–5.1)
PROT SERPL-MCNC: 8.2 G/DL (ref 5.7–8.2)
SODIUM SERPL-SCNC: 138 MMOL/L (ref 136–145)
TOTAL IRON BINDING CAPACITY: 470 UG/DL (ref 250–425)
TRANSFERRIN SERPL-MCNC: 355 MG/DL (ref 250–380)

## 2025-03-07 PROCEDURE — 3078F DIAST BP <80 MM HG: CPT

## 2025-03-07 PROCEDURE — 82728 ASSAY OF FERRITIN: CPT

## 2025-03-07 PROCEDURE — G2211 COMPLEX E/M VISIT ADD ON: HCPCS

## 2025-03-07 PROCEDURE — 3008F BODY MASS INDEX DOCD: CPT

## 2025-03-07 PROCEDURE — 83550 IRON BINDING TEST: CPT

## 2025-03-07 PROCEDURE — 83540 ASSAY OF IRON: CPT

## 2025-03-07 PROCEDURE — 80053 COMPREHEN METABOLIC PANEL: CPT

## 2025-03-07 PROCEDURE — 36415 COLL VENOUS BLD VENIPUNCTURE: CPT

## 2025-03-07 PROCEDURE — 3074F SYST BP LT 130 MM HG: CPT

## 2025-03-07 PROCEDURE — 3044F HG A1C LEVEL LT 7.0%: CPT

## 2025-03-07 PROCEDURE — 99214 OFFICE O/P EST MOD 30 MIN: CPT

## 2025-03-07 NOTE — TELEPHONE ENCOUNTER
MRI completed on 2/28/25    Impression   CONCLUSION:  Multilevel advanced cervical spondylosis with multilevel central canal stenosis and cord compression and foraminal stenosis.  No cord signal abnormality.

## 2025-03-07 NOTE — PROGRESS NOTES
Mae Mark is a 74 year old female.   Chief Complaint   Patient presents with    Weakness     Yb rm 16b heaviness and weakness  for years  maybe due to infusions and arthritis      HPI:    Patient reports over last few years feeling more tired. She denies dietary or lifestyle changes. Following with GI getting skyrizi infusions. Following with neurology for B12 deficiency and weakness with recent MRI testing completed. She does not exercise, in warmer months enjoys walking outside. She denies chest pain, shortness of breath, falls/injuries, vomiting. She reports she has discussed symptoms with GI and thought fatigue related to infusions.      Allergies:  Allergies[1]   Current Meds:  Current Outpatient Medications   Medication Sig Dispense Refill    Omeprazole 40 MG Oral Capsule Delayed Release Take 1 capsule (40 mg total) by mouth daily. 90 capsule 3    Risankizumab-rzaa (SKYRIZI) 360 MG/2.4ML Subcutaneous Solution Cartridge Inject 2.4 mL into the skin Every 8 (eight) weeks. 2.4 mL 5    clotrimazole-betamethasone 1-0.05 % External Cream APPLY TO THE AFFECTED AREA(S) TWICE DAILY FOR TWO WEEKS 60 g 2    escitalopram 5 MG Oral Tab Take 1 tablet (5 mg total) by mouth daily. 90 tablet 3    potassium citrate (UROCIT-K 5) 5 MEQ (540 MG) Oral Tab CR Take 1 tablet (5 mEq total) by mouth in the morning, at noon, and at bedtime. 270 tablet 5    HYDROcodone-acetaminophen (NORCO) 5-325 MG Oral Tab Take 1 tablet by mouth every 6 (six) hours as needed for Pain. 30 tablet 0    HYDROcodone-acetaminophen 5-325 MG Oral Tab Take 1-2 tablets by mouth every 4 (four) hours as needed for Pain. 20 tablet 0    ondansetron (ZOFRAN) 4 mg tablet Take 1 tablet (4 mg total) by mouth every 8 (eight) hours as needed for Nausea. 90 tablet 0    methylPREDNISolone (MEDROL) 4 MG Oral Tablet Therapy Pack As directed. 21 tablet 0    potassium citrate-citric acid 1100-334 MG/5ML Oral Solution Take 15 mL (30 mEq total) by mouth 3 (three) times  daily with meals. 473 mL 11    Glucose Blood (ACCU-CHEK GUIDE) In Vitro Strip Use 1 (one) new strip daily for blood glucose monitoring 100 strip 3    Accu-Chek FastClix Lancets Does not apply Misc 1 Lancet by Finger stick route 2 (two) times daily.      ketoconazole 2 % External Cream Apply 1 Application topically 2 (two) times daily. APPLY TO AFFECTED AREA      HYDROcodone-acetaminophen (NORCO) 5-325 MG Oral Tab Take 1 tablet by mouth every 6 (six) hours as needed for Pain. 30 tablet 0    metRONIDAZOLE 0.75 % External Cream apply TO AFFECTED AREA ON face EVERY DAY BEFORE NOON      pimecrolimus 1 % External Cream apply topically TWICE DAILY TO affected AREAS      Sulfacetamide Sodium-Sulfur 10-5 % External Liquid Apply 1 Application topically 2 (two) times daily.      triamcinolone 0.1 % External Cream APPLY TO THE AFFECTED AREA(S) ON BACK TWICE DAILY FOR UP TO TWO WEEKS. AVOID face, armpits AND groin      albuterol (VENTOLIN HFA) 108 (90 Base) MCG/ACT Inhalation Aero Soln Inhale 2 puffs into the lungs every 6 (six) hours as needed for Wheezing. 1 each 3    Tiotropium Bromide Monohydrate (SPIRIVA RESPIMAT) 2.5 MCG/ACT Inhalation Aero Soln Inhale 2 puffs into the lungs daily. 1 each 3    acetaminophen 500 MG Oral Tab Take 1 tablet (500 mg total) by mouth every 6 (six) hours as needed for Pain.      PEG 3350-KCl-Na Bicarb-NaCl 420 g Oral Recon Soln Take 4,000 mL by mouth As Directed. 1 each 0    cephalexin 500 MG Oral Cap TAKE ALL FOUR capsules 30-60 MINUTES prior TO THE procedure      cyclobenzaprine 5 MG Oral Tab Take 1 tablet (5 mg total) by mouth 2 (two) times daily as needed for Muscle spasms.      methylPREDNISolone (MEDROL) 4 MG Oral Tablet Therapy Pack As directed. 1 each 0    hydroCHLOROthiazide 12.5 MG Oral Cap Take 1 capsule (12.5 mg total) by mouth daily. 30 capsule 0        PMH:     Past Medical History:    Abdominal distention    Abdominal hernia    2021    Abdominal pain    years ago before ostomy     Anemia    occasional in the past    Anxiety    Arthritis    Asthma (HCC)    Atypical mole    Back pain    Bad breath    mouth hot/dry    Belching    Some liquids have begun causing belching. Sodas, sometimes water    Black stools    years ago    Bleeding nose    Bloating    Blood in the stool    years ago    Blood in urine    Blurred vision    Body piercing    Calculus of kidney    Have just been told I have kidney stones by [facility] Doctor    Change in hair    hair drier than usual    Chest pain on exertion    pressure    Chronic cough    Crohn's disease (HCC)    Diabetes mellitus (HCC)    Diarrhea, unspecified    Dizziness    still not steady    Easy bruising    Fatigue    Fever    Flatulence/gas pain/belching    No more than normal    Food intolerance    Frequent urination    Headache disorder    from deyhdration?    Hearing impairment    hearing aids    Hearing loss    Dr. Perea    Heartburn    Hemorrhoids    High cholesterol    Indigestion    have no gallbladder    Irregular bowel habits    chron's    Itch of skin    all over/not severe but bothersome when skin very dry. Scratch & is inflammed    Kidney failure    mild    Leg swelling    ankles-once in a while    Loss of appetite    Mouth sores    fever blisters inside lips/tongue    Nausea    since January 2018-occasional    Night sweats    Osteoarthritis    hands, knees    Pain in joints    Painful swallowing    not painful but uncomfortable    Painful urination    Prediabetes    Problems with swallowing    Began taking Terbinafine March 22, 2023 for a toenail fungal infection. Began noticing irritated throat/top of gut were burning and swallowing becoming uncomfortable. Also experiencing weakness, dehydration, nausea, taste buds gone, loss of appetite    Rash    Shortness of breath    smoker    Skin blushing/flushing    years ago with active crohn's/before ileostomy    Sleep apnea    trouble sleeping since June 2023    Sleep disturbance    Sputum  production    Stool incontinence    years ago    Stress    typical on occasion    Uncomfortable fullness after meals    Visual impairment    glasses    Vomiting    Wears glasses    Weight gain    Weight loss    dieted in Sept. 2022. lost about 8 lbs. Regained about 4. April, 2023 taste buds destroyed (temporarily?) due to reaction to Terbinafine. Lost about 20 lbs. between late April & September, 2023.    Wheezing       ROS:   Review of Systems   Constitutional:  Positive for fatigue. Negative for activity change, appetite change, chills and fever.   HENT: Negative.     Respiratory: Negative.     Cardiovascular: Negative.    Gastrointestinal: Negative.    Musculoskeletal:  Positive for arthralgias (bilateral elbow and hand pain).   Neurological: Negative.       PHYSICAL EXAM:    /60   Pulse 60   Temp 97 °F (36.1 °C) (Temporal)   Resp 21   Ht 5' 1\" (1.549 m)   Wt 123 lb (55.8 kg)   LMP  (LMP Unknown)   SpO2 100%   BMI 23.24 kg/m²   Physical Exam  Constitutional:       Appearance: Normal appearance. She is not ill-appearing or toxic-appearing.   HENT:      Right Ear: Tympanic membrane normal.      Left Ear: Tympanic membrane normal.   Cardiovascular:      Pulses: Normal pulses.      Heart sounds: Normal heart sounds.   Pulmonary:      Effort: Pulmonary effort is normal.      Breath sounds: Normal breath sounds.   Skin:     General: Skin is warm and dry.      Findings: No bruising, erythema or rash.   Neurological:      Mental Status: She is alert. Mental status is at baseline.        ASSESSMENT/ PLAN:   1. Other fatigue  Check labs to determine further management. Routine exercise and follow healthy diet.   - Comp Metabolic Panel (14) [E]; Future  - Iron And Tibc [E]; Future  - Ferritin [E]; Future    2. Crohn's disease of small intestine without complication (HCC)  Continue current management per GI    3. Arthralgia of both elbows  Discussed exercises- previously advised to see rheumatology     4. B12  deficiency  Follow up with neurology on recent labs/testing results- continue current management      Health Maintenance Due   Topic Date Due    COVID-19 Vaccine (8 - 2024-25 season) 09/01/2024    Influenza Vaccine (1) 10/01/2024    Annual Physical  11/20/2024    Mammogram  12/08/2024    Annual Depression Screening  01/01/2025    Fall Risk Screening (Annual)  01/01/2025    Diabetes Care: Foot Exam (Annual)  01/01/2025    Tobacco Cessation Counseling  Never done    Diabetes Care: Microalb/Creat Ratio (Annual)  01/01/2025    Diabetes Care Dilated Eye Exam  04/24/2025     Pt indicates understanding and agrees to the plan.     No follow-ups on file.    Edie Childress, APRN          [1]   Allergies  Allergen Reactions    Mold Spores ASTHMA, ITCHING, Runny nose and WHEEZING

## 2025-03-07 NOTE — TELEPHONE ENCOUNTER
Patient calling, advised that she completed MRI on 2/28. Requesting a call back to discuss results.     Please advise.

## 2025-03-10 ENCOUNTER — OFFICE VISIT (OUTPATIENT)
Facility: LOCATION | Age: 75
End: 2025-03-10
Payer: COMMERCIAL

## 2025-03-10 VITALS
TEMPERATURE: 98 F | DIASTOLIC BLOOD PRESSURE: 56 MMHG | OXYGEN SATURATION: 99 % | RESPIRATION RATE: 20 BRPM | HEART RATE: 62 BPM | SYSTOLIC BLOOD PRESSURE: 126 MMHG

## 2025-03-10 DIAGNOSIS — K50.00 CROHN'S DISEASE OF SMALL INTESTINE WITHOUT COMPLICATION (HCC): ICD-10-CM

## 2025-03-10 DIAGNOSIS — K43.5 PARASTOMAL HERNIA WITHOUT OBSTRUCTION OR GANGRENE: Primary | ICD-10-CM

## 2025-03-10 PROCEDURE — 99213 OFFICE O/P EST LOW 20 MIN: CPT | Performed by: STUDENT IN AN ORGANIZED HEALTH CARE EDUCATION/TRAINING PROGRAM

## 2025-03-10 PROCEDURE — 3078F DIAST BP <80 MM HG: CPT | Performed by: STUDENT IN AN ORGANIZED HEALTH CARE EDUCATION/TRAINING PROGRAM

## 2025-03-10 PROCEDURE — 3074F SYST BP LT 130 MM HG: CPT | Performed by: STUDENT IN AN ORGANIZED HEALTH CARE EDUCATION/TRAINING PROGRAM

## 2025-03-10 NOTE — PROGRESS NOTES
Follow Up Visit Note       Active Problems      1. Parastomal hernia without obstruction or gangrene    2. Crohn's disease of small intestine without complication (HCC)          Chief Complaint   Chief Complaint   Patient presents with    Establish Care     EP- Parastomal hernia without obstruction or gangrene, Crohn's Disease- pt reports pain and bulging around stoma especially while coughing        History of Present Illness  This is a very nice 74-year-old female with history of Crohn's disease status post remote history of open colectomy with creation of end ileostomy in 1979 who returns for follow-up today in regards to a parastomal hernia.    I first met the patient in the emergency room on 10/2/2024 with left flank pain.  She was found to have a left-sided kidney stone and was discharged home.  Her CT scan that day did show a parastomal hernia containing small bowel.    Patient has noted a bulge around her stoma for the last 5 years.  She complains of pain that radiates from her navel to her pubic area with coughing.  Patient did have an episode of cramping abdominal pain after eating almonds about a week ago.  Otherwise, no obstructive symptoms and no changes in her ostomy output.  Patient is a smoker.  No significant issues with pouching lately.  No known history of prior small bowel obstruction.    Patient is currently being treated with Skyrizi and follows with [facility] gastroenterology.  She underwent an ileoscopy on 10/16/2024 with Dr. Lewis which showed ileitis.  Other prior surgery includes a small bowel resection, cholecystectomy and appendectomy in the 80's.  No history of prior hernia repair.      Allergies  Mae is allergic to mold spores.    Past Medical / Surgical / Social / Family History    The past medical and past surgical history have been reviewed by me today.    Past Medical History:    Abdominal distention    Abdominal hernia    2021    Abdominal pain    years ago before ostomy     Anemia    occasional in the past    Anxiety    Arthritis    Asthma (HCC)    Atypical mole    Back pain    Bad breath    mouth hot/dry    Belching    Some liquids have begun causing belching. Sodas, sometimes water    Black stools    years ago    Bleeding nose    Bloating    Blood in the stool    years ago    Blood in urine    Blurred vision    Body piercing    Calculus of kidney    Have just been told I have kidney stones by [facility] Doctor    Change in hair    hair drier than usual    Chest pain on exertion    pressure    Chronic cough    Crohn's disease (HCC)    Diabetes mellitus (HCC)    Diarrhea, unspecified    Dizziness    still not steady    Easy bruising    Fatigue    Fever    Flatulence/gas pain/belching    No more than normal    Food intolerance    Frequent urination    Headache disorder    from deyhdration?    Hearing impairment    hearing aids    Hearing loss    Dr. Perea    Heartburn    Hemorrhoids    High cholesterol    Indigestion    have no gallbladder    Irregular bowel habits    chron's    Itch of skin    all over/not severe but bothersome when skin very dry. Scratch & is inflammed    Kidney failure    mild    Leg swelling    ankles-once in a while    Loss of appetite    Mouth sores    fever blisters inside lips/tongue    Nausea    since January 2018-occasional    Night sweats    Osteoarthritis    hands, knees    Pain in joints    Painful swallowing    not painful but uncomfortable    Painful urination    Prediabetes    Problems with swallowing    Began taking Terbinafine March 22, 2023 for a toenail fungal infection. Began noticing irritated throat/top of gut were burning and swallowing becoming uncomfortable. Also experiencing weakness, dehydration, nausea, taste buds gone, loss of appetite    Rash    Shortness of breath    smoker    Skin blushing/flushing    years ago with active crohn's/before ileostomy    Sleep apnea    trouble sleeping since June 2023    Sleep disturbance    Sputum  production    Stool incontinence    years ago    Stress    typical on occasion    Uncomfortable fullness after meals    Visual impairment    glasses    Vomiting    Wears glasses    Weight gain    Weight loss    dieted in 2022. lost about 8 lbs. Regained about 4.  taste buds destroyed (temporarily?) due to reaction to Terbinafine. Lost about 20 lbs. between late April & .    Wheezing     Past Surgical History:   Procedure Laterality Date    Appendectomy      removed during ileostomy?    Bowel resection      Cataract Left 2018    Cataract Right 10/2018    Cholecystectomy      Colectomy      Colonoscopy  ?    over 10 years    Correct bunion,simple      Hand/finger surgery unlisted Right     trigger thumb release    Hand/finger surgery unlisted Left 2014    A1 Pulley release    Part removal colon w end colostomy      colon resection w/ileostomy    Revision of ileostomy,simple         The family history and social history have been reviewed by me today.    Family History   Problem Relation Age of Onset    Diabetes Father         father    Cancer Father         lung, +smoker    Crohn's Disease Sister     Crohn's Disease Sister         Crohn's - her son also has Crohn's now, also my great niece has Crohn's    Diabetes Brother         only father    Crohn's Disease Brother             Cancer Brother         lung cancer, +smoker    Breast Cancer Maternal Grandmother 80            Crohn's Disease Nephew     Crohn's Disease Other     Crohn's Disease Brother              Social History     Socioeconomic History    Marital status:    Tobacco Use    Smoking status: Every Day     Current packs/day: 0.50     Average packs/day: 0.5 packs/day for 51.7 years (25.8 ttl pk-yrs)     Types: Cigarettes     Start date: 1972    Smokeless tobacco: Never    Tobacco comments:     tried Chantix three times-after about 2 months I became depressed and irritable   Vaping  Use    Vaping status: Never Used   Substance and Sexual Activity    Alcohol use: Not Currently     Comment: very rarely consume alcohol    Drug use: Not Currently     Types: Cannabis     Comment: occasionally    Sexual activity: Not Currently   Other Topics Concern    Caffeine Concern Yes    Exercise Yes    Seat Belt Yes        Current Outpatient Medications:     Omeprazole 40 MG Oral Capsule Delayed Release, Take 1 capsule (40 mg total) by mouth daily., Disp: 90 capsule, Rfl: 3    Risankizumab-rzaa (SKYRIZI) 360 MG/2.4ML Subcutaneous Solution Cartridge, Inject 2.4 mL into the skin Every 8 (eight) weeks., Disp: 2.4 mL, Rfl: 5    clotrimazole-betamethasone 1-0.05 % External Cream, APPLY TO THE AFFECTED AREA(S) TWICE DAILY FOR TWO WEEKS, Disp: 60 g, Rfl: 2    escitalopram 5 MG Oral Tab, Take 1 tablet (5 mg total) by mouth daily., Disp: 90 tablet, Rfl: 3    potassium citrate (UROCIT-K 5) 5 MEQ (540 MG) Oral Tab CR, Take 1 tablet (5 mEq total) by mouth in the morning, at noon, and at bedtime., Disp: 270 tablet, Rfl: 5    HYDROcodone-acetaminophen (NORCO) 5-325 MG Oral Tab, Take 1 tablet by mouth every 6 (six) hours as needed for Pain., Disp: 30 tablet, Rfl: 0    HYDROcodone-acetaminophen 5-325 MG Oral Tab, Take 1-2 tablets by mouth every 4 (four) hours as needed for Pain., Disp: 20 tablet, Rfl: 0    ondansetron (ZOFRAN) 4 mg tablet, Take 1 tablet (4 mg total) by mouth every 8 (eight) hours as needed for Nausea., Disp: 90 tablet, Rfl: 0    methylPREDNISolone (MEDROL) 4 MG Oral Tablet Therapy Pack, As directed., Disp: 21 tablet, Rfl: 0    cephalexin 500 MG Oral Cap, TAKE ALL FOUR capsules 30-60 MINUTES prior TO THE procedure, Disp: , Rfl:     cyclobenzaprine 5 MG Oral Tab, Take 1 tablet (5 mg total) by mouth 2 (two) times daily as needed for Muscle spasms., Disp: , Rfl:     potassium citrate-citric acid 1100-334 MG/5ML Oral Solution, Take 15 mL (30 mEq total) by mouth 3 (three) times daily with meals., Disp: 473 mL, Rfl:  11    Glucose Blood (ACCU-CHEK GUIDE) In Vitro Strip, Use 1 (one) new strip daily for blood glucose monitoring, Disp: 100 strip, Rfl: 3    hydroCHLOROthiazide 12.5 MG Oral Cap, Take 1 capsule (12.5 mg total) by mouth daily., Disp: 30 capsule, Rfl: 0    Accu-Chek FastClix Lancets Does not apply Misc, 1 Lancet by Finger stick route 2 (two) times daily., Disp: , Rfl:     ketoconazole 2 % External Cream, Apply 1 Application topically 2 (two) times daily. APPLY TO AFFECTED AREA, Disp: , Rfl:     HYDROcodone-acetaminophen (NORCO) 5-325 MG Oral Tab, Take 1 tablet by mouth every 6 (six) hours as needed for Pain., Disp: 30 tablet, Rfl: 0    metRONIDAZOLE 0.75 % External Cream, apply TO AFFECTED AREA ON face EVERY DAY BEFORE NOON, Disp: , Rfl:     pimecrolimus 1 % External Cream, apply topically TWICE DAILY TO affected AREAS, Disp: , Rfl:     Sulfacetamide Sodium-Sulfur 10-5 % External Liquid, Apply 1 Application topically 2 (two) times daily., Disp: , Rfl:     triamcinolone 0.1 % External Cream, APPLY TO THE AFFECTED AREA(S) ON BACK TWICE DAILY FOR UP TO TWO WEEKS. AVOID face, armpits AND groin, Disp: , Rfl:     albuterol (VENTOLIN HFA) 108 (90 Base) MCG/ACT Inhalation Aero Soln, Inhale 2 puffs into the lungs every 6 (six) hours as needed for Wheezing., Disp: 1 each, Rfl: 3    Tiotropium Bromide Monohydrate (SPIRIVA RESPIMAT) 2.5 MCG/ACT Inhalation Aero Soln, Inhale 2 puffs into the lungs daily., Disp: 1 each, Rfl: 3    acetaminophen 500 MG Oral Tab, Take 1 tablet (500 mg total) by mouth every 6 (six) hours as needed for Pain., Disp: , Rfl:      Review of Systems  A 10 point review of systems was performed and negative unless otherwise documented per HPI.     Physical Findings   /73   Pulse 62   Temp 97.9 °F (36.6 °C) (Temporal)   Resp 20   LMP  (LMP Unknown)   SpO2 99%   Physical Exam  Vitals and nursing note reviewed. Exam conducted with a chaperone present.   Constitutional:       General: She is not in acute  distress.  HENT:      Head: Normocephalic and atraumatic.      Mouth/Throat:      Mouth: Mucous membranes are moist.   Cardiovascular:      Rate and Rhythm: Normal rate and regular rhythm.   Pulmonary:      Effort: Pulmonary effort is normal.   Abdominal:      General: There is no distension.      Palpations: Abdomen is soft.      Tenderness: There is no abdominal tenderness.      Hernia: A hernia (There is a parastomal hernia which is at least partially reducible and nontender to palpation) is present.      Comments: Well-healed midline and right upper quadrant subcostal scars.  Right sided ileostomy with appliance in place.   Musculoskeletal:         General: No deformity.   Skin:     General: Skin is warm and dry.   Neurological:      General: No focal deficit present.      Mental Status: She is alert.   Psychiatric:         Mood and Affect: Mood normal.     I have personally reviewed the imaging of patient's most recent CT scans of the abdomen and pelvis. CT scan on 10/2/2024 shows a parastomal hernia containing small bowel.      Assessment   1. Parastomal hernia without obstruction or gangrene    2. Crohn's disease of small intestine without complication (HCC)      This is a very nice 74-year-old female with history of Crohn's disease status post remote history of open colectomy with creation of end ileostomy in 1979 who returns for follow-up today in regards to a parastomal hernia.    I first met the patient in the emergency room on 10/2/2024 with left flank pain.  She was found to have a left-sided kidney stone and was discharged home.  Her CT scan that day did show a parastomal hernia containing small bowel.    Patient has noted a bulge around her stoma for the last 5 years.  She complains of pain that radiates from her navel to her pubic area with coughing.  Patient did have an episode of cramping abdominal pain after eating almonds about a week ago.  Otherwise, no obstructive symptoms and no changes in her  ostomy output.  Patient is a smoker.  No significant issues with pouching lately.  No known history of prior small bowel obstruction.    Patient is currently being treated with Skyrizi and follows with [facility] gastroenterology.  She underwent an ileoscopy on 10/16/2024 with Dr. Lewis which showed ileitis.  Other prior surgery includes a small bowel resection, cholecystectomy and appendectomy in the 80's.  No history of prior hernia repair.    Exam does confirm the presence of a parastomal hernia which is nontender to palpation and partially reducible.    Plan   I discussed options with the patient of watchful observation versus planning for elective parastomal hernia repair.  Both options have some degree of risks.    Risks of watchful observation would be bowel incarceration, obstruction or even strangulation.  These complications could require urgent/emergent surgical intervention if they were to happen.  That being said, this is a chronic hernia and I think this risk is fairly low.    Risks of parastomal hernia repair includes but not limited to pain, bleeding, infection, possibility of damage to the bowel, and high likelihood of hernia recurrence.  This patient in particular is at somewhat higher risk of recurrent hernia given the fact that she smokes.    Patient expressed understanding of the options and wished to proceed with watchful observation for now as her symptoms are currently not life-limiting.  Patient and I agreed for ongoing follow-up in around 6 months time.  Patient is welcome to contact me in the meantime with any new or worsening symptoms.     No orders of the defined types were placed in this encounter.      Imaging & Referrals   None    Follow Up  No follow-ups on file.    Ghassan Navarro MD

## 2025-03-10 NOTE — TELEPHONE ENCOUNTER
Patient advised regarding the test results. She is not interested in surgery but states she will speak with neurosurgery.

## 2025-04-03 NOTE — PROGRESS NOTES
HPI:     Mae Mark is a 74 year old female with a PMH of anxiety, HL, DM, PERLA, Crohn's s/p total colectomy + ileostomy (1979), partial SBR, OA.  Following for:  1. Recurrent CaOx kidney stones  - right ureteral stone incidentally noted on imaging. S/p right URS 2/8/24 with likely right IP stone. Known left renal stones.  - no prior episodes  2. Severe hypocitraturia + low UOP as urocit pills are too big  - on 30 urocit solution BID-TID 8/23/24 and 5 urocit BID-TID  - 24 h urine 8/16/24: low UOP, citrate, pH (1420, 80, 5.564)  - 30 urocit solution BID 2/14/24  - 24 h urine 2/25/24: very low UOP, citrate (1175, < 23)  3. [facility]     PCP - Chicho  Gen Surg - Ramon  LOV 10/15/2024    Presents for check-up.  She has a parastomal hernia and plan is observation.  Has had R flank pain x 1 w up to 8/10. Worsens with movement. Still with intermittent vaginal/urethral pain and R sciatic pain.  She has increased urocit solution to 30 BID and is now taking 5 urocit BID. Also on 12.5 HCTZ for HTN.  No hematuria, dysuria. She had urethral pain LOV with negative urine PCR.    Hip/back pain: occasional R sciatic pain  Diarrhea/constipation: loose stools per colostomy    UTI hx: none  Gross hematuria: episode ~ 30 y ago, nothing since  Tobacco hx: ~ 80 pack years, currently 1/2 PPD  Fam h/o  malignancy: none    UA is negative    Reported ~ 20-30 oz water, 12 coffee with medium to light yellow urine. Drinking about the same amount. I encouraged the pt drink at least 40-60 oz water per day or enough to keep urine clear to pale yellow for stone prevention.    CTU 4/15/25: stable ~ 4 RMP (possible IP). 4, 2 LLP.  CT SP 10/2/24: 4 RMP (possibly IP), punctate RLP. 3, 2 LLP  KUB 8/20/24: 4 RMP, 2 LLP  CT SP 8/14/24: 4-5 RMP, 1,1 RLP. Stable L renal stones.   CT AP 11/24/23: 7 proximal right ureteral, 4 RMP. 74 x 78 mm left renal cyst, 2 LLP  CT SP 7/15/23: 4 RMP, 8 RLP    I reviewed the prior CT scans with her and it is  possible that the 4 RMP stone is actually intraparenchymal.   The stones appear stable in size and are small at this time.  I would recommend continuing observation.    Discussed options for the low back/sciatic/vaginal pain and she is interested in PFT referral.    She will double water intake, continue urocit solution BID and 5 urocit tab BID for severe hypocitraturia. Prefers observation for the small LLP stones and has Rx for norco in case she develops pain. PFT referral placed. F/u in 1 y with KUB.    Prior note:  Developed severe L flank pain with CT findings below. Pain resolved within a few hours. Having pain at the urethra which improved with abx but persists.  On infusions now for Crohn's flares and feels weak most days.    She is taking 30 mEq urocit solution daily as she couldn't swallow the pills and has slightly increased water intake.     Discussed that as she is taking 30 mEq urocit solution the needs to at least double or triple    Discussed options for recurrent stones and persistent severe hypocitraturia she will increase urocit by taking 30 urocit solution TID (which is typically BID for her) and add 5 urocit tablets TID which she thinks she can take    Prior note:  If she has passed stone I would still recommend cystoscopy at some point given AMH in the past.    She will significantly increase water intake for stone prevention, checking CT SP and OR as noted above. She requests Nephrology referral for CKD. Can consider 24 h urine later but will discuss this at NOV.    HISTORY:  Past Medical History:    Abdominal distention    Abdominal hernia    2021    Abdominal pain    years ago before ostomy    Anemia    occasional in the past    Anxiety    Arthritis    Asthma (HCC)    Atypical mole    Back pain    Bad breath    mouth hot/dry    Belching    Some liquids have begun causing belching. Sodas, sometimes water    Black stools    years ago    Bleeding nose    Bloating    Blood in the stool    years  ago    Blood in urine    Blurred vision    Body piercing    Calculus of kidney    Have just been told I have kidney stones by [facility] Doctor    Change in hair    hair drier than usual    Chest pain on exertion    pressure    Chronic cough    Crohn's disease (HCC)    Diabetes mellitus (HCC)    Diarrhea, unspecified    Dizziness    still not steady    Easy bruising    Fatigue    Fever    Flatulence/gas pain/belching    No more than normal    Food intolerance    Frequent urination    Headache disorder    from deyhdration?    Hearing impairment    hearing aids    Hearing loss    Dr. Perea    Heartburn    Hemorrhoids    High cholesterol    Indigestion    have no gallbladder    Irregular bowel habits    chron's    Itch of skin    all over/not severe but bothersome when skin very dry. Scratch & is inflammed    Kidney failure    mild    Leg swelling    ankles-once in a while    Loss of appetite    Mouth sores    fever blisters inside lips/tongue    Nausea    since January 2018-occasional    Night sweats    Osteoarthritis    hands, knees    Pain in joints    Painful swallowing    not painful but uncomfortable    Painful urination    Prediabetes    Problems with swallowing    Began taking Terbinafine March 22, 2023 for a toenail fungal infection. Began noticing irritated throat/top of gut were burning and swallowing becoming uncomfortable. Also experiencing weakness, dehydration, nausea, taste buds gone, loss of appetite    Rash    Shortness of breath    smoker    Skin blushing/flushing    years ago with active crohn's/before ileostomy    Sleep apnea    trouble sleeping since June 2023    Sleep disturbance    Sputum production    Stool incontinence    years ago    Stress    typical on occasion    Uncomfortable fullness after meals    Visual impairment    glasses    Vomiting    Wears glasses    Weight gain    Weight loss    dieted in Sept. 2022. lost about 8 lbs. Regained about 4. April, 2023 taste buds destroyed  (temporarily?) due to reaction to Terbinafine. Lost about 20 lbs. between late April & .    Wheezing      Past Surgical History:   Procedure Laterality Date    Appendectomy      removed during ileostomy?    Bowel resection      Cataract Left 2018    Cataract Right 10/2018    Cholecystectomy      Colectomy      Colonoscopy  ?    over 10 years    Correct bunion,simple      Hand/finger surgery unlisted Right     trigger thumb release    Hand/finger surgery unlisted Left 2014    A1 Pulley release    Part removal colon w end colostomy      colon resection w/ileostomy    Revision of ileostomy,simple        Family History   Problem Relation Age of Onset    Diabetes Father         father    Cancer Father         lung, +smoker    Crohn's Disease Sister     Crohn's Disease Sister         Crohn's - her son also has Crohn's now, also my great niece has Crohn's    Diabetes Brother         only father    Crohn's Disease Brother             Cancer Brother         lung cancer, +smoker    Breast Cancer Maternal Grandmother 80            Crohn's Disease Nephew     Crohn's Disease Other     Crohn's Disease Brother               Social History:   Social History     Socioeconomic History    Marital status:    Tobacco Use    Smoking status: Every Day     Current packs/day: 0.50     Average packs/day: 0.5 packs/day for 51.8 years (25.9 ttl pk-yrs)     Types: Cigarettes     Start date: 1973    Smokeless tobacco: Never    Tobacco comments:     tried Chantix three times-after about 2 months I became depressed and irritable   Vaping Use    Vaping status: Never Used   Substance and Sexual Activity    Alcohol use: Not Currently     Comment: very rarely consume alcohol    Drug use: Not Currently     Types: Cannabis     Comment: occasionally    Sexual activity: Not Currently   Other Topics Concern    Caffeine Concern Yes    Exercise Yes    Seat Belt Yes   Social History Narrative     , no children.  Retired, previously worked in administration.        Medications (Active prior to today's visit):  Current Outpatient Medications   Medication Sig Dispense Refill    potassium citrate (UROCIT-K 5) 5 MEQ (540 MG) Oral Tab CR Take 1 tablet (5 mEq total) by mouth in the morning, at noon, and at bedtime. 270 tablet 5    potassium citrate-citric acid 1100-334 MG/5ML Oral Solution Take 15 mL (30 mEq total) by mouth 3 (three) times daily with meals. 473 mL 11    Omeprazole 40 MG Oral Capsule Delayed Release Take 1 capsule (40 mg total) by mouth daily. 90 capsule 3    Risankizumab-rzaa (SKYRIZI) 360 MG/2.4ML Subcutaneous Solution Cartridge Inject 2.4 mL into the skin Every 8 (eight) weeks. 2.4 mL 5    clotrimazole-betamethasone 1-0.05 % External Cream APPLY TO THE AFFECTED AREA(S) TWICE DAILY FOR TWO WEEKS 60 g 2    escitalopram 5 MG Oral Tab Take 1 tablet (5 mg total) by mouth daily. 90 tablet 3    HYDROcodone-acetaminophen (NORCO) 5-325 MG Oral Tab Take 1 tablet by mouth every 6 (six) hours as needed for Pain. 30 tablet 0    HYDROcodone-acetaminophen 5-325 MG Oral Tab Take 1-2 tablets by mouth every 4 (four) hours as needed for Pain. 20 tablet 0    ondansetron (ZOFRAN) 4 mg tablet Take 1 tablet (4 mg total) by mouth every 8 (eight) hours as needed for Nausea. 90 tablet 0    methylPREDNISolone (MEDROL) 4 MG Oral Tablet Therapy Pack As directed. 21 tablet 0    cephalexin 500 MG Oral Cap TAKE ALL FOUR capsules 30-60 MINUTES prior TO THE procedure      cyclobenzaprine 5 MG Oral Tab Take 1 tablet (5 mg total) by mouth 2 (two) times daily as needed for Muscle spasms.      Glucose Blood (ACCU-CHEK GUIDE) In Vitro Strip Use 1 (one) new strip daily for blood glucose monitoring 100 strip 3    hydroCHLOROthiazide 12.5 MG Oral Cap Take 1 capsule (12.5 mg total) by mouth daily. 30 capsule 0    Accu-Chek FastClix Lancets Does not apply Misc 1 Lancet by Finger stick route 2 (two) times daily.      ketoconazole 2 %  External Cream Apply 1 Application topically 2 (two) times daily. APPLY TO AFFECTED AREA      HYDROcodone-acetaminophen (NORCO) 5-325 MG Oral Tab Take 1 tablet by mouth every 6 (six) hours as needed for Pain. 30 tablet 0    metRONIDAZOLE 0.75 % External Cream apply TO AFFECTED AREA ON face EVERY DAY BEFORE NOON      pimecrolimus 1 % External Cream apply topically TWICE DAILY TO affected AREAS      Sulfacetamide Sodium-Sulfur 10-5 % External Liquid Apply 1 Application topically 2 (two) times daily.      triamcinolone 0.1 % External Cream APPLY TO THE AFFECTED AREA(S) ON BACK TWICE DAILY FOR UP TO TWO WEEKS. AVOID face, armpits AND groin      albuterol (VENTOLIN HFA) 108 (90 Base) MCG/ACT Inhalation Aero Soln Inhale 2 puffs into the lungs every 6 (six) hours as needed for Wheezing. 1 each 3    Tiotropium Bromide Monohydrate (SPIRIVA RESPIMAT) 2.5 MCG/ACT Inhalation Aero Soln Inhale 2 puffs into the lungs daily. 1 each 3    acetaminophen 500 MG Oral Tab Take 1 tablet (500 mg total) by mouth every 6 (six) hours as needed for Pain.         Allergies:  Allergies   Allergen Reactions    Mold Spores ASTHMA, ITCHING, Runny nose and WHEEZING         ROS:     A comprehensive 10 point review of systems was completed.  Pertinent positives and negatives noted in the the HPI.    PHYSICAL EXAM:     GENERAL APPEARANCE: well, developed, well nourished, in no acute distress  NEUROLOGIC: nonfocal, alert and oriented  HEAD: normocephalic, atraumatic  EYES: sclera non-icteric  EARS: hearing intact  ORAL CAVITY: mucosa moist  NECK/THYROID: no obvious goiter or masses  LUNGS: nonlabored breathing  ABDOMEN: soft, no obvious masses or tenderness  SKIN: no obvious rashes    : as noted above     ASSESSMENT/PLAN:   Diagnoses and all orders for this visit:    Hypocitraturia  -     URINALYSIS, AUTO, W/O SCOPE  -     Kidney Stone Urine Test Combination With Saturation Calculations; Future  -     potassium citrate-citric acid 1100-334 MG/5ML Oral  Solution; Take 15 mL (30 mEq total) by mouth 3 (three) times daily with meals.    Recurrent kidney stones  -     XR ABDOMEN (1 VIEW) (CPT=74018); Future    Recurrent UTI    Pelvic pain  -     PELVIC FLOOR THERAPY - INTERNAL    Other orders  -     potassium citrate (UROCIT-K 5) 5 MEQ (540 MG) Oral Tab CR; Take 1 tablet (5 mEq total) by mouth in the morning, at noon, and at bedtime.    - as noted above.    Thanks again for this consult.    Joao Marie MD, FACS  Urologist  [facility]  Office: [phone]

## 2025-04-08 ENCOUNTER — LAB ENCOUNTER (OUTPATIENT)
Dept: LAB | Age: 75
End: 2025-04-08
Payer: COMMERCIAL

## 2025-04-08 DIAGNOSIS — E61.1 IRON DEFICIENCY: ICD-10-CM

## 2025-04-08 LAB
DEPRECATED HBV CORE AB SER IA-ACNC: 21 NG/ML
IRON SATN MFR SERPL: 14 %
IRON SERPL-MCNC: 58 UG/DL
TOTAL IRON BINDING CAPACITY: 428 UG/DL (ref 250–425)
TRANSFERRIN SERPL-MCNC: 340 MG/DL (ref 250–380)

## 2025-04-08 PROCEDURE — 83540 ASSAY OF IRON: CPT

## 2025-04-08 PROCEDURE — 83550 IRON BINDING TEST: CPT

## 2025-04-08 PROCEDURE — 82728 ASSAY OF FERRITIN: CPT

## 2025-04-15 ENCOUNTER — HOSPITAL ENCOUNTER (OUTPATIENT)
Dept: CT IMAGING | Facility: HOSPITAL | Age: 75
Discharge: HOME | End: 2025-04-15
Attending: UROLOGY
Payer: COMMERCIAL

## 2025-04-15 DIAGNOSIS — R31.21 ASYMPTOMATIC MICROSCOPIC HEMATURIA: ICD-10-CM

## 2025-04-15 DIAGNOSIS — N20.0 KIDNEY STONE: ICD-10-CM

## 2025-04-15 LAB
CREAT BLD-MCNC: 1.6 MG/DL (ref 0.55–1.02)
EGFRCR SERPLBLD CKD-EPI 2021: 34 ML/MIN/1.73M2 (ref 60–?)

## 2025-04-15 PROCEDURE — 74178 CT ABD&PLV WO CNTR FLWD CNTR: CPT | Performed by: UROLOGY

## 2025-04-15 PROCEDURE — 76377 3D RENDER W/INTRP POSTPROCES: CPT | Performed by: UROLOGY

## 2025-04-15 PROCEDURE — 82565 ASSAY OF CREATININE: CPT

## 2025-04-16 ENCOUNTER — OFFICE VISIT (OUTPATIENT)
Dept: SURGERY | Facility: CLINIC | Age: 75
End: 2025-04-16
Payer: COMMERCIAL

## 2025-04-16 DIAGNOSIS — R82.991 HYPOCITRATURIA: Primary | ICD-10-CM

## 2025-04-16 DIAGNOSIS — N20.0 RECURRENT KIDNEY STONES: ICD-10-CM

## 2025-04-16 DIAGNOSIS — N39.0 RECURRENT UTI: ICD-10-CM

## 2025-04-16 DIAGNOSIS — R10.2 PELVIC PAIN: ICD-10-CM

## 2025-04-16 LAB
APPEARANCE: CLEAR
BILIRUBIN: NEGATIVE
GLUCOSE (URINE DIPSTICK): NEGATIVE MG/DL
KETONES (URINE DIPSTICK): NEGATIVE MG/DL
LEUKOCYTES: NEGATIVE
MULTISTIX LOT#: ABNORMAL NUMERIC
NITRITE, URINE: NEGATIVE
PH, URINE: 6 (ref 4.5–8)
PROTEIN (URINE DIPSTICK): 30 MG/DL
SPECIFIC GRAVITY: 1.02 (ref 1–1.03)
URINE-COLOR: YELLOW
UROBILINOGEN,SEMI-QN: 0.2 MG/DL (ref 0–1.9)

## 2025-04-16 PROCEDURE — 99214 OFFICE O/P EST MOD 30 MIN: CPT | Performed by: UROLOGY

## 2025-04-16 PROCEDURE — G2211 COMPLEX E/M VISIT ADD ON: HCPCS | Performed by: UROLOGY

## 2025-04-16 PROCEDURE — 81003 URINALYSIS AUTO W/O SCOPE: CPT | Performed by: UROLOGY

## 2025-04-16 RX ORDER — POTASSIUM CITRATE AND CITRIC ACID MONOHYDRATE 1100; 334 MG/5ML; MG/5ML
15 SOLUTION ORAL
Qty: 473 ML | Refills: 11 | Status: SHIPPED | OUTPATIENT
Start: 2025-04-16

## 2025-04-16 RX ORDER — POTASSIUM CITRATE 540 MG/1
5 TABLET, EXTENDED RELEASE ORAL 3 TIMES DAILY
Qty: 270 TABLET | Refills: 5 | Status: SHIPPED | OUTPATIENT
Start: 2025-04-16

## 2025-04-29 ENCOUNTER — TELEPHONE (OUTPATIENT)
Dept: SURGERY | Facility: CLINIC | Age: 75
End: 2025-04-29

## 2025-04-29 NOTE — TELEPHONE ENCOUNTER
Called pt to obtain additional information   Pt asking if it is okay to use pink himalayan salt instead of regular table salt   Pt states she did not have any questions about potassium citrate    Verbally discussed above with Gena Neves PA-C: okay to use pink himalayan salt     Spoke with pt and informed of above and verbalized understanding     This encounter is now closed

## 2025-05-14 ENCOUNTER — OFFICE VISIT (OUTPATIENT)
Dept: PODIATRY CLINIC | Facility: CLINIC | Age: 75
End: 2025-05-14
Payer: COMMERCIAL

## 2025-05-14 DIAGNOSIS — E11.9 DIET-CONTROLLED DIABETES MELLITUS (HCC): ICD-10-CM

## 2025-05-14 DIAGNOSIS — B35.1 ONYCHOMYCOSIS: Primary | ICD-10-CM

## 2025-05-14 DIAGNOSIS — L60.3 NAIL DYSTROPHY: ICD-10-CM

## 2025-05-14 PROCEDURE — 99203 OFFICE O/P NEW LOW 30 MIN: CPT | Performed by: STUDENT IN AN ORGANIZED HEALTH CARE EDUCATION/TRAINING PROGRAM

## 2025-05-14 NOTE — PROGRESS NOTES
[facility] Podiatry  Progress Note    Mae Mark is a 74 year old female.   Chief Complaint   Patient presents with    Consult     Left toenail fungus- discolored-     Toenail Care     Nail care and foot check- pcp lov          HPI:     Patient is a pleasant 74-year-old female who presents to clinic for evaluation of elongated and thickened nails she has difficulty trimming on her own.  She is concerned about possible nail fungus and wondering what treatment options are available.  She took oral Lamisil in the past but did not tolerate this well without GI complications.  No other complaints are mentioned.  Past medical history, medications, and allergies reviewed.      Allergies: Mold spores   Current Medications[1]   Past Medical History[2]   Past Surgical History[3]   Family History[4]   Social Hx on file[5]        REVIEW OF SYSTEMS:     No n/v/f/c.      EXAM:   LMP  (LMP Unknown)   GENERAL: well developed, well nourished, in no apparent distress  EXTREMITIES:       1. Integument: Normal skin temperature and turgor.  Nails x 10 are elongated and thickened.  Dystrophic changes noted to several nails including left hallux nail.  2. Vascular:  Pedal pulses are palpable.   3. Musculoskeletal: All muscle groups are graded 5 out of 5 in the foot and ankle.   4. Neurological: Normal sharp dull sensation; reflexes normal.        ASSESSMENT AND PLAN:   Diagnoses and all orders for this visit:    Onychomycosis    Nail dystrophy        Plan:    -Patient examined, chart history reviewed.  -Discussed importance of proper pedal hygiene, regular foot checks.  -Sharply debrided nails x10 with a sterile nail nipper achieving a 20% reduction in thickness and length, without incident. Nails further smoothed with dremel. Can use kerasal to nails as needed. Not interested in more aggressive treatment options.  -Ambulate with supportive shoes.  -Educated patient on acute signs of infection advised patient to seek immediate  medical attention if symptoms arise.     The patient indicates understanding of these issues and agrees to the plan.        Fredrick Resendiz DPM    Dragon speech recognition software was used to prepare this note.  Errors in word recognition may occur.  Please contact me with any questions/concerns with this note.           [1]   Current Outpatient Medications   Medication Sig Dispense Refill    potassium citrate (UROCIT-K 5) 5 MEQ (540 MG) Oral Tab CR Take 1 tablet (5 mEq total) by mouth in the morning, at noon, and at bedtime. 270 tablet 5    potassium citrate-citric acid 1100-334 MG/5ML Oral Solution Take 15 mL (30 mEq total) by mouth 3 (three) times daily with meals. 473 mL 11    Omeprazole 40 MG Oral Capsule Delayed Release Take 1 capsule (40 mg total) by mouth daily. 90 capsule 3    Risankizumab-rzaa (SKYRIZI) 360 MG/2.4ML Subcutaneous Solution Cartridge Inject 2.4 mL into the skin Every 8 (eight) weeks. 2.4 mL 5    clotrimazole-betamethasone 1-0.05 % External Cream APPLY TO THE AFFECTED AREA(S) TWICE DAILY FOR TWO WEEKS 60 g 2    escitalopram 5 MG Oral Tab Take 1 tablet (5 mg total) by mouth daily. 90 tablet 3    HYDROcodone-acetaminophen (NORCO) 5-325 MG Oral Tab Take 1 tablet by mouth every 6 (six) hours as needed for Pain. 30 tablet 0    HYDROcodone-acetaminophen 5-325 MG Oral Tab Take 1-2 tablets by mouth every 4 (four) hours as needed for Pain. 20 tablet 0    ondansetron (ZOFRAN) 4 mg tablet Take 1 tablet (4 mg total) by mouth every 8 (eight) hours as needed for Nausea. 90 tablet 0    methylPREDNISolone (MEDROL) 4 MG Oral Tablet Therapy Pack As directed. 21 tablet 0    cephalexin 500 MG Oral Cap TAKE ALL FOUR capsules 30-60 MINUTES prior TO THE procedure      cyclobenzaprine 5 MG Oral Tab Take 1 tablet (5 mg total) by mouth 2 (two) times daily as needed for Muscle spasms.      Glucose Blood (ACCU-CHEK GUIDE) In Vitro Strip Use 1 (one) new strip daily for blood glucose monitoring 100 strip 3     hydroCHLOROthiazide 12.5 MG Oral Cap Take 1 capsule (12.5 mg total) by mouth daily. 30 capsule 0    Accu-Chek FastClix Lancets Does not apply Misc 1 Lancet by Finger stick route 2 (two) times daily.      ketoconazole 2 % External Cream Apply 1 Application topically 2 (two) times daily. APPLY TO AFFECTED AREA      HYDROcodone-acetaminophen (NORCO) 5-325 MG Oral Tab Take 1 tablet by mouth every 6 (six) hours as needed for Pain. 30 tablet 0    metRONIDAZOLE 0.75 % External Cream apply TO AFFECTED AREA ON face EVERY DAY BEFORE NOON      pimecrolimus 1 % External Cream apply topically TWICE DAILY TO affected AREAS      Sulfacetamide Sodium-Sulfur 10-5 % External Liquid Apply 1 Application topically 2 (two) times daily.      triamcinolone 0.1 % External Cream APPLY TO THE AFFECTED AREA(S) ON BACK TWICE DAILY FOR UP TO TWO WEEKS. AVOID face, armpits AND groin      albuterol (VENTOLIN HFA) 108 (90 Base) MCG/ACT Inhalation Aero Soln Inhale 2 puffs into the lungs every 6 (six) hours as needed for Wheezing. 1 each 3    Tiotropium Bromide Monohydrate (SPIRIVA RESPIMAT) 2.5 MCG/ACT Inhalation Aero Soln Inhale 2 puffs into the lungs daily. 1 each 3    acetaminophen 500 MG Oral Tab Take 1 tablet (500 mg total) by mouth every 6 (six) hours as needed for Pain.     [2]   Past Medical History:   Abdominal distention    Abdominal hernia    2021    Abdominal pain    years ago before ostomy    Anemia    occasional in the past    Anxiety    Arthritis    Asthma (HCC)    Atypical mole    Back pain    Bad breath    mouth hot/dry    Belching    Some liquids have begun causing belching. Sodas, sometimes water    Black stools    years ago    Bleeding nose    Bloating    Blood in the stool    years ago    Blood in urine    Blurred vision    Body piercing    Calculus of kidney    Have just been told I have kidney stones by [facility] Doctor    Change in hair    hair drier than usual    Chest pain on exertion    pressure    Chronic cough     Crohn's disease (HCC)    Diabetes mellitus (HCC)    Diarrhea, unspecified    Dizziness    still not steady    Easy bruising    Fatigue    Fever    Flatulence/gas pain/belching    No more than normal    Food intolerance    Frequent urination    Headache disorder    from deyhdration?    Hearing impairment    hearing aids    Hearing loss    Dr. Perea    Heartburn    Hemorrhoids    High cholesterol    Indigestion    have no gallbladder    Irregular bowel habits    chron's    Itch of skin    all over/not severe but bothersome when skin very dry. Scratch & is inflammed    Kidney failure    mild    Leg swelling    ankles-once in a while    Loss of appetite    Mouth sores    fever blisters inside lips/tongue    Nausea    since January 2018-occasional    Night sweats    Osteoarthritis    hands, knees    Pain in joints    Painful swallowing    not painful but uncomfortable    Painful urination    Prediabetes    Problems with swallowing    Began taking Terbinafine March 22, 2023 for a toenail fungal infection. Began noticing irritated throat/top of gut were burning and swallowing becoming uncomfortable. Also experiencing weakness, dehydration, nausea, taste buds gone, loss of appetite    Rash    Shortness of breath    smoker    Skin blushing/flushing    years ago with active crohn's/before ileostomy    Sleep apnea    trouble sleeping since June 2023    Sleep disturbance    Sputum production    Stool incontinence    years ago    Stress    typical on occasion    Uncomfortable fullness after meals    Visual impairment    glasses    Vomiting    Wears glasses    Weight gain    Weight loss    dieted in Sept. 2022. lost about 8 lbs. Regained about 4. April, 2023 taste buds destroyed (temporarily?) due to reaction to Terbinafine. Lost about 20 lbs. between late April & September, 2023.    Wheezing   [3]   Past Surgical History:  Procedure Laterality Date    Appendectomy      removed during ileostomy?    Bowel resection       Cataract Left 2018    Cataract Right 10/2018    Cholecystectomy      Colectomy      Colonoscopy  ?    over 10 years    Correct bunion,simple      Hand/finger surgery unlisted Right     trigger thumb release    Hand/finger surgery unlisted Left 2014    A1 Pulley release    Part removal colon w end colostomy      colon resection w/ileostomy    Revision of ileostomy,simple     [4]   Family History  Problem Relation Age of Onset    Diabetes Father         father    Cancer Father         lung, +smoker    Crohn's Disease Sister     Crohn's Disease Sister         Crohn's - her son also has Crohn's now, also my great niece has Crohn's    Diabetes Brother         only father    Crohn's Disease Brother             Cancer Brother         lung cancer, +smoker    Breast Cancer Maternal Grandmother 80            Crohn's Disease Nephew     Crohn's Disease Other     Crohn's Disease Brother            [5]   Social History  Socioeconomic History    Marital status:    Tobacco Use    Smoking status: Every Day     Current packs/day: 0.50     Average packs/day: 0.5 packs/day for 51.9 years (25.9 ttl pk-yrs)     Types: Cigarettes     Start date: 1973    Smokeless tobacco: Never    Tobacco comments:     tried Chantix three times-after about 2 months I became depressed and irritable   Vaping Use    Vaping status: Never Used   Substance and Sexual Activity    Alcohol use: Not Currently     Comment: very rarely consume alcohol    Drug use: Not Currently     Types: Cannabis     Comment: occasionally    Sexual activity: Not Currently   Other Topics Concern    Caffeine Concern Yes    Exercise Yes    Seat Belt Yes

## 2025-05-28 ENCOUNTER — APPOINTMENT (OUTPATIENT)
Dept: GENERAL RADIOLOGY | Age: 75
End: 2025-05-28
Attending: NURSE PRACTITIONER
Payer: COMMERCIAL

## 2025-05-28 ENCOUNTER — APPOINTMENT (OUTPATIENT)
Dept: CT IMAGING | Age: 75
End: 2025-05-28
Attending: NURSE PRACTITIONER
Payer: COMMERCIAL

## 2025-05-28 ENCOUNTER — HOSPITAL ENCOUNTER (OUTPATIENT)
Age: 75
Discharge: HOME | End: 2025-05-28
Payer: COMMERCIAL

## 2025-05-28 VITALS
SYSTOLIC BLOOD PRESSURE: 164 MMHG | RESPIRATION RATE: 18 BRPM | TEMPERATURE: 98 F | OXYGEN SATURATION: 100 % | HEART RATE: 66 BPM | DIASTOLIC BLOOD PRESSURE: 62 MMHG

## 2025-05-28 DIAGNOSIS — S00.03XA HEMATOMA OF SCALP, INITIAL ENCOUNTER: Primary | ICD-10-CM

## 2025-05-28 DIAGNOSIS — R04.0 NOSEBLEED: ICD-10-CM

## 2025-05-28 DIAGNOSIS — S09.90XA INJURY OF HEAD, INITIAL ENCOUNTER: ICD-10-CM
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
## 2025-05-28 PROCEDURE — 73130 X-RAY EXAM OF HAND: CPT | Performed by: NURSE PRACTITIONER

## 2025-05-28 PROCEDURE — 99214 OFFICE O/P EST MOD 30 MIN: CPT

## 2025-05-28 PROCEDURE — 70150 X-RAY EXAM OF FACIAL BONES: CPT | Performed by: NURSE PRACTITIONER

## 2025-05-28 PROCEDURE — 70450 CT HEAD/BRAIN W/O DYE: CPT | Performed by: NURSE PRACTITIONER
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
## 2025-05-28 NOTE — ED INITIAL ASSESSMENT (HPI)
Patient reports tripping over portable heater at home striking head on metal cart. Sustained hematoma scalp, had a bloody nose, and both hands hurt.

## 2025-05-29 NOTE — DISCHARGE INSTRUCTIONS
Your dose of acetaminophen (Tylenol) is 650 mg every 4 hours for pain or fevers as needed.     If you develop any severe headache, projectile vomiting or visual disturbances please go to the emergency room for further evaluation.  Otherwise please follow-up with your primary care provider within 1 day.

## 2025-06-04 ENCOUNTER — HOSPITAL ENCOUNTER (OUTPATIENT)
Dept: GENERAL RADIOLOGY | Age: 75
Discharge: HOME | End: 2025-06-04
Attending: PHYSICIAN ASSISTANT
Payer: COMMERCIAL

## 2025-06-04 ENCOUNTER — OFFICE VISIT (OUTPATIENT)
Dept: INTERNAL MEDICINE CLINIC | Facility: CLINIC | Age: 75
End: 2025-06-04
Payer: COMMERCIAL

## 2025-06-04 VITALS
RESPIRATION RATE: 16 BRPM | OXYGEN SATURATION: 98 % | DIASTOLIC BLOOD PRESSURE: 82 MMHG | HEIGHT: 60.75 IN | SYSTOLIC BLOOD PRESSURE: 126 MMHG | BODY MASS INDEX: 23.49 KG/M2 | WEIGHT: 122.81 LBS | HEART RATE: 73 BPM | TEMPERATURE: 98 F

## 2025-06-04 DIAGNOSIS — M25.531 RIGHT WRIST PAIN: ICD-10-CM

## 2025-06-04 DIAGNOSIS — M25.562 ACUTE PAIN OF LEFT KNEE: ICD-10-CM

## 2025-06-04 DIAGNOSIS — E83.42 HYPOMAGNESEMIA: ICD-10-CM

## 2025-06-04 DIAGNOSIS — S09.90XD INJURY OF HEAD, SUBSEQUENT ENCOUNTER: ICD-10-CM

## 2025-06-04 DIAGNOSIS — E53.8 B12 DEFICIENCY: ICD-10-CM
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
## 2025-06-04 PROCEDURE — 3074F SYST BP LT 130 MM HG: CPT | Performed by: PHYSICIAN ASSISTANT

## 2025-06-04 PROCEDURE — 3008F BODY MASS INDEX DOCD: CPT | Performed by: PHYSICIAN ASSISTANT

## 2025-06-04 PROCEDURE — 73110 X-RAY EXAM OF WRIST: CPT | Performed by: PHYSICIAN ASSISTANT

## 2025-06-04 PROCEDURE — 3079F DIAST BP 80-89 MM HG: CPT | Performed by: PHYSICIAN ASSISTANT

## 2025-06-04 PROCEDURE — 99214 OFFICE O/P EST MOD 30 MIN: CPT | Performed by: PHYSICIAN ASSISTANT

## 2025-06-04 PROCEDURE — G2211 COMPLEX E/M VISIT ADD ON: HCPCS | Performed by: PHYSICIAN ASSISTANT

## 2025-06-04 PROCEDURE — 73562 X-RAY EXAM OF KNEE 3: CPT | Performed by: PHYSICIAN ASSISTANT

## 2025-06-04 RX ORDER — CYCLOBENZAPRINE HCL 5 MG
5 TABLET ORAL NIGHTLY PRN
Qty: 20 TABLET | Refills: 0 | Status: SHIPPED | OUTPATIENT
Start: 2025-06-04

## 2025-06-04 NOTE — PROGRESS NOTES
Mae Mark is a 74 year old female.   Chief Complaint   Patient presents with    Urgent Care F/u     [facility] Rm 1- Pt is here for  f/u for a fall     HPI:    Pt presents for UC f/u. She fell over a heater on 5/28/25. She hit the right side of her head on a desk and she also fell onto her hands. She denies LOC. In the  her workup included CT head, xrays of both hands, and xray of facial bones. Imaging was negative for acute findings except for scalp hematoma. Sine then she feels scalp bruising and swelling has improved. Her right wrist continues to be bothersome and she notes left knee pain. +bruising to left knee. She had some knee swelling as well although this is better. She is able to walk. She also c/o right upper back pain which feels like a spasm.   She is taking tylenol prn with limited benefit.     She would also like her Mg and B12 rechecked as they have been low in the past.       Allergies:  Allergies[1]   Current Meds:  Current Medications[2]     PMH:   Past Medical History[3]    ROS:   GENERAL: Negative for fever, chills and fatigue. NAD.  HENT: Negative for congestion, sore throat, and ear pain.  RESPIRATORY: Negative for cough, chest tightness, shortness of breath and wheezing.    CV: Negative for chest pain, palpitations and leg swelling.   GI: Negative for nausea, vomiting, abdominal pain, diarrhea, and blood in stool.   : Negative for dysuria, hematuria and difficulty urinating.   MUSCULOSKELETAL: +back pain, +left knee pain, +right wrist pain  NEURO: Negative for dizziness, syncope, weakness, numbness, tingling and headaches.   PSYCH: The patient is not nervous/anxious. No depression.      PHYSICAL EXAM:    /82   Pulse 73   Temp 97.6 °F (36.4 °C) (Temporal)   Resp 16   Ht 5' 0.75\" (1.543 m)   Wt 122 lb 12.8 oz (55.7 kg)   LMP  (LMP Unknown)   SpO2 98%   BMI 23.39 kg/m²     GENERAL: NAD. A&Ox3  HEENT: Ear canals clear, TMs pearly gray bilaterally. Throat without erythema or  exudates. Bruising to right scalp and right forehead, small resolving hematoma to right scalp.   NECK: No LAD.   RESPIRATORY: CTAB, no R/R/W  CV: RRR, no murmurs.   UE: right radial wrist tenderness   LE: left anterior/inferior knee tenderness; bruising and swelling to left knee   PSYCH: Appropriate mood and affect.      ASSESSMENT/ PLAN:   1. Fall, subsequent encounter  UC records reviewed   Fall seems to be mechanical   Continue tylenol prn for pain  Can use flexeril prn for spasms - pt aware may cause drowsiness and no taking while driving or with other possibly sedating meds     2. Acute pain of left knee  Check xray to further eval   - XR KNEE (3 VIEWS), LEFT (CPT=73562); Future    3. Right wrist pain  Check xray   - XR WRIST COMPLETE (MIN 3 VIEWS), RIGHT (CPT=73110); Future    4. Injury of head, subsequent encounter  Hematoma seems to be improving   No acute neuro deficits     5. B12 deficiency  Check labs   - Vitamin B12 [E]; Future    6. Hypomagnesemia  Check labs   - Magnesium [E]; Future       Health Maintenance Due   Topic Date Due    COVID-19 Vaccine (8 - 2024-25 season) 09/01/2024    Annual Physical  11/20/2024    Mammogram  12/08/2024    Tobacco Cessation Counseling  Never done    Diabetes Care: Microalb/Creat Ratio (Annual)  01/01/2025    Diabetes Care Foot Exam  03/05/2025    Diabetes Care Dilated Eye Exam  04/24/2025                   Pt indicates understanding and agrees to the plan.     Return if symptoms worsen or fail to improve.    Lia Zarate PA-C                   [1]   Allergies  Allergen Reactions    Mold Spores ASTHMA, ITCHING, Runny nose and WHEEZING   [2]   Current Outpatient Medications   Medication Sig Dispense Refill    cyclobenzaprine 5 MG Oral Tab Take 1 tablet (5 mg total) by mouth nightly as needed for Muscle spasms. 20 tablet 0    potassium citrate (UROCIT-K 5) 5 MEQ (540 MG) Oral Tab CR Take 1 tablet (5 mEq total) by mouth in the morning, at noon, and at bedtime. 270  tablet 5    potassium citrate-citric acid 1100-334 MG/5ML Oral Solution Take 15 mL (30 mEq total) by mouth 3 (three) times daily with meals. 473 mL 11    Omeprazole 40 MG Oral Capsule Delayed Release Take 1 capsule (40 mg total) by mouth daily. 90 capsule 3    Risankizumab-rzaa (SKYRIZI) 360 MG/2.4ML Subcutaneous Solution Cartridge Inject 2.4 mL into the skin Every 8 (eight) weeks. 2.4 mL 5    clotrimazole-betamethasone 1-0.05 % External Cream APPLY TO THE AFFECTED AREA(S) TWICE DAILY FOR TWO WEEKS 60 g 2    escitalopram 5 MG Oral Tab Take 1 tablet (5 mg total) by mouth daily. 90 tablet 3    HYDROcodone-acetaminophen (NORCO) 5-325 MG Oral Tab Take 1 tablet by mouth every 6 (six) hours as needed for Pain. 30 tablet 0    HYDROcodone-acetaminophen 5-325 MG Oral Tab Take 1-2 tablets by mouth every 4 (four) hours as needed for Pain. 20 tablet 0    ondansetron (ZOFRAN) 4 mg tablet Take 1 tablet (4 mg total) by mouth every 8 (eight) hours as needed for Nausea. 90 tablet 0    methylPREDNISolone (MEDROL) 4 MG Oral Tablet Therapy Pack As directed. 21 tablet 0    Glucose Blood (ACCU-CHEK GUIDE) In Vitro Strip Use 1 (one) new strip daily for blood glucose monitoring 100 strip 3    Accu-Chek FastClix Lancets Does not apply Misc 1 Lancet by Finger stick route 2 (two) times daily.      ketoconazole 2 % External Cream Apply 1 Application topically 2 (two) times daily. APPLY TO AFFECTED AREA      HYDROcodone-acetaminophen (NORCO) 5-325 MG Oral Tab Take 1 tablet by mouth every 6 (six) hours as needed for Pain. 30 tablet 0    metRONIDAZOLE 0.75 % External Cream apply TO AFFECTED AREA ON face EVERY DAY BEFORE NOON      pimecrolimus 1 % External Cream apply topically TWICE DAILY TO affected AREAS      Sulfacetamide Sodium-Sulfur 10-5 % External Liquid Apply 1 Application topically 2 (two) times daily.      triamcinolone 0.1 % External Cream APPLY TO THE AFFECTED AREA(S) ON BACK TWICE DAILY FOR UP TO TWO WEEKS. AVOID face, armpits AND  groin      albuterol (VENTOLIN HFA) 108 (90 Base) MCG/ACT Inhalation Aero Soln Inhale 2 puffs into the lungs every 6 (six) hours as needed for Wheezing. 1 each 3    Tiotropium Bromide Monohydrate (SPIRIVA RESPIMAT) 2.5 MCG/ACT Inhalation Aero Soln Inhale 2 puffs into the lungs daily. 1 each 3    acetaminophen 500 MG Oral Tab Take 1 tablet (500 mg total) by mouth every 6 (six) hours as needed for Pain.      cephalexin 500 MG Oral Cap TAKE ALL FOUR capsules 30-60 MINUTES prior TO THE procedure      cyclobenzaprine 5 MG Oral Tab Take 1 tablet (5 mg total) by mouth 2 (two) times daily as needed for Muscle spasms.      hydroCHLOROthiazide 12.5 MG Oral Cap Take 1 capsule (12.5 mg total) by mouth daily. 30 capsule 0   [3]   Past Medical History:   Abdominal distention    Abdominal hernia    2021    Abdominal pain    years ago before ostomy    Anemia    occasional in the past    Anxiety    Arthritis    Asthma (HCC)    Atypical mole    Back pain    Bad breath    mouth hot/dry    Belching    Some liquids have begun causing belching. Sodas, sometimes water    Black stools    years ago    Bleeding nose    Bloating    Blood in the stool    years ago    Blood in urine    Blurred vision    Body piercing    Calculus of kidney    Have just been told I have kidney stones by [facility] Doctor    Change in hair    hair drier than usual    Chest pain on exertion    pressure    Chronic cough    Crohn's disease (HCC)    Diabetes mellitus (HCC)    Diarrhea, unspecified    Dizziness    still not steady    Easy bruising    Fatigue    Fever    Flatulence/gas pain/belching    No more than normal    Food intolerance    Frequent urination    Headache disorder    from deyhdration?    Hearing impairment    hearing aids    Hearing loss    Dr. Perea    Heartburn    Hemorrhoids    High cholesterol    Indigestion    have no gallbladder    Irregular bowel habits    chron's    Itch of skin    all over/not severe but bothersome when skin very  dry. Scratch & is inflammed    Kidney failure    mild    Leg swelling    ankles-once in a while    Loss of appetite    Mouth sores    fever blisters inside lips/tongue    Nausea    since January 2018-occasional    Night sweats    Osteoarthritis    hands, knees    Pain in joints    Painful swallowing    not painful but uncomfortable    Painful urination    Prediabetes    Problems with swallowing    Began taking Terbinafine March 22, 2023 for a toenail fungal infection. Began noticing irritated throat/top of gut were burning and swallowing becoming uncomfortable. Also experiencing weakness, dehydration, nausea, taste buds gone, loss of appetite    Rash    Shortness of breath    smoker    Skin blushing/flushing    years ago with active crohn's/before ileostomy    Sleep apnea    trouble sleeping since June 2023    Sleep disturbance    Sputum production    Stool incontinence    years ago    Stress    typical on occasion    Uncomfortable fullness after meals    Visual impairment    glasses    Vomiting    Wears glasses    Weight gain    Weight loss    dieted in Sept. 2022. lost about 8 lbs. Regained about 4. April, 2023 taste buds destroyed (temporarily?) due to reaction to Terbinafine. Lost about 20 lbs. between late April & September, 2023.    Wheezing

## 2025-06-12 ENCOUNTER — LAB ENCOUNTER (OUTPATIENT)
Dept: LAB | Facility: HOSPITAL | Age: 75
End: 2025-06-12
Attending: INTERNAL MEDICINE
Payer: COMMERCIAL

## 2025-06-12 DIAGNOSIS — D50.8 OTHER IRON DEFICIENCY ANEMIA: ICD-10-CM

## 2025-06-12 DIAGNOSIS — K50.019 CROHN'S DISEASE OF SMALL INTESTINE WITH COMPLICATION (HCC): ICD-10-CM

## 2025-06-12 DIAGNOSIS — R19.5 ELEVATED FECAL CALPROTECTIN: ICD-10-CM

## 2025-06-12 PROCEDURE — 83993 ASSAY FOR CALPROTECTIN FECAL: CPT

## 2025-06-15 LAB — CALPROTECTIN STL-MCNT: 199 ΜG/G (ref ?–50)

## 2025-06-17 ENCOUNTER — PATIENT MESSAGE (OUTPATIENT)
Dept: INTERNAL MEDICINE CLINIC | Facility: CLINIC | Age: 75
End: 2025-06-17

## 2025-06-17 NOTE — TELEPHONE ENCOUNTER
LOV 6/4/25  5. B12 deficiency  Check labs   - Vitamin B12 [E]; Future     6. Hypomagnesemia  Check labs   - Magnesium [E]; Future

## 2025-06-18 NOTE — TELEPHONE ENCOUNTER
CS ordered b12 level for her in her recent visit, will need to see results before deciding on injections.

## 2025-07-07 ENCOUNTER — LAB ENCOUNTER (OUTPATIENT)
Dept: LAB | Age: 75
End: 2025-07-07
Payer: COMMERCIAL

## 2025-07-07 DIAGNOSIS — E53.8 B12 DEFICIENCY: ICD-10-CM

## 2025-07-07 DIAGNOSIS — D50.8 OTHER IRON DEFICIENCY ANEMIA: ICD-10-CM

## 2025-07-07 DIAGNOSIS — K50.019 CROHN'S DISEASE OF SMALL INTESTINE WITH COMPLICATION (HCC): ICD-10-CM

## 2025-07-07 DIAGNOSIS — R19.5 ELEVATED FECAL CALPROTECTIN: ICD-10-CM

## 2025-07-07 DIAGNOSIS — E83.42 HYPOMAGNESEMIA: ICD-10-CM

## 2025-07-07 DIAGNOSIS — E61.1 IRON DEFICIENCY: ICD-10-CM

## 2025-07-07 LAB
ALBUMIN SERPL-MCNC: 4.4 G/DL (ref 3.2–4.8)
ALBUMIN/GLOB SERPL: 1.4 {RATIO} (ref 1–2)
ALP LIVER SERPL-CCNC: 96 U/L (ref 55–142)
ALT SERPL-CCNC: 11 U/L (ref 10–49)
ANION GAP SERPL CALC-SCNC: 8 MMOL/L (ref 0–18)
AST SERPL-CCNC: 20 U/L (ref ?–34)
BASOPHILS # BLD AUTO: 0.11 X10(3) UL (ref 0–0.2)
BASOPHILS NFR BLD AUTO: 1.3 %
BILIRUB SERPL-MCNC: 0.5 MG/DL (ref 0.2–1.1)
BUN BLD-MCNC: 25 MG/DL (ref 9–23)
CALCIUM BLD-MCNC: 9.5 MG/DL (ref 8.7–10.6)
CHLORIDE SERPL-SCNC: 105 MMOL/L (ref 98–112)
CO2 SERPL-SCNC: 23 MMOL/L (ref 21–32)
CREAT BLD-MCNC: 1.28 MG/DL (ref 0.55–1.02)
CRP SERPL-MCNC: <0.5 MG/DL (ref ?–0.5)
DEPRECATED HBV CORE AB SER IA-ACNC: 121 NG/ML (ref 50–306)
EGFRCR SERPLBLD CKD-EPI 2021: 44 ML/MIN/1.73M2 (ref 60–?)
EOSINOPHIL # BLD AUTO: 0.25 X10(3) UL (ref 0–0.7)
EOSINOPHIL NFR BLD AUTO: 2.9 %
ERYTHROCYTE [DISTWIDTH] IN BLOOD BY AUTOMATED COUNT: 15.8 %
FASTING STATUS PATIENT QL REPORTED: YES
GLOBULIN PLAS-MCNC: 3.1 G/DL (ref 2–3.5)
GLUCOSE BLD-MCNC: 110 MG/DL (ref 70–99)
HCT VFR BLD AUTO: 44.7 % (ref 35–48)
HGB BLD-MCNC: 14.5 G/DL (ref 12–16)
IMM GRANULOCYTES # BLD AUTO: 0.11 X10(3) UL (ref 0–1)
IMM GRANULOCYTES NFR BLD: 1.3 %
IRON SATN MFR SERPL: 22 % (ref 15–50)
IRON SERPL-MCNC: 86 UG/DL (ref 50–170)
LYMPHOCYTES # BLD AUTO: 2.51 X10(3) UL (ref 1–4)
LYMPHOCYTES NFR BLD AUTO: 28.7 %
MAGNESIUM SERPL-MCNC: 1.4 MG/DL (ref 1.6–2.6)
MCH RBC QN AUTO: 29.4 PG (ref 26–34)
MCHC RBC AUTO-ENTMCNC: 32.4 G/DL (ref 31–37)
MCV RBC AUTO: 90.5 FL (ref 80–100)
MONOCYTES # BLD AUTO: 0.69 X10(3) UL (ref 0.1–1)
MONOCYTES NFR BLD AUTO: 7.9 %
NEUTROPHILS # BLD AUTO: 5.09 X10 (3) UL (ref 1.5–7.7)
NEUTROPHILS # BLD AUTO: 5.09 X10(3) UL (ref 1.5–7.7)
NEUTROPHILS NFR BLD AUTO: 57.9 %
OSMOLALITY SERPL CALC.SUM OF ELEC: 287 MOSM/KG (ref 275–295)
PLATELET # BLD AUTO: 333 10(3)UL (ref 150–450)
POTASSIUM SERPL-SCNC: 4.7 MMOL/L (ref 3.5–5.1)
PROT SERPL-MCNC: 7.5 G/DL (ref 5.7–8.2)
RBC # BLD AUTO: 4.94 X10(6)UL (ref 3.8–5.3)
SODIUM SERPL-SCNC: 136 MMOL/L (ref 136–145)
TOTAL IRON BINDING CAPACITY: 395 UG/DL (ref 250–425)
TRANSFERRIN SERPL-MCNC: 320 MG/DL (ref 250–380)
VIT B12 SERPL-MCNC: 270 PG/ML (ref 211–911)
WBC # BLD AUTO: 8.8 X10(3) UL (ref 4–11)

## 2025-07-07 PROCEDURE — 83540 ASSAY OF IRON: CPT

## 2025-07-07 PROCEDURE — 86140 C-REACTIVE PROTEIN: CPT

## 2025-07-07 PROCEDURE — 82728 ASSAY OF FERRITIN: CPT

## 2025-07-07 PROCEDURE — 36415 COLL VENOUS BLD VENIPUNCTURE: CPT

## 2025-07-07 PROCEDURE — 83735 ASSAY OF MAGNESIUM: CPT

## 2025-07-07 PROCEDURE — 82607 VITAMIN B-12: CPT

## 2025-07-07 PROCEDURE — 85025 COMPLETE CBC W/AUTO DIFF WBC: CPT

## 2025-07-07 PROCEDURE — 83550 IRON BINDING TEST: CPT

## 2025-07-07 PROCEDURE — 80053 COMPREHEN METABOLIC PANEL: CPT

## 2025-07-08 ENCOUNTER — TELEPHONE (OUTPATIENT)
Dept: INTERNAL MEDICINE CLINIC | Facility: CLINIC | Age: 75
End: 2025-07-08

## 2025-07-08 ENCOUNTER — NURSE TRIAGE (OUTPATIENT)
Dept: INTERNAL MEDICINE CLINIC | Facility: CLINIC | Age: 75
End: 2025-07-08

## 2025-07-08 NOTE — TELEPHONE ENCOUNTER
Pt called asking if she had order for vitamin B12 to complete. She states she is supposed to start B12 injections but cannot start until lab is drawn. Informed her B12 lab was drawn with the rest of her labs yesterday and once Lia Zarate PA-C reviews her results we will let her know what her recommendations are. She verbalized understanding and is agreeable to plan.

## 2025-07-08 NOTE — TELEPHONE ENCOUNTER
Kasey Mark  P [facility] [id number] minutes ago (2:07 PM)     Appointment for: Mae Mark ([id number])  Visit type: MYCHART EXAM (2964)  7/12/2025 8:30 AM (30 minutes) with Edie Childress in [facility] [address] [place] [address]     Patient comments:  rule out urinary tract infection  Reason for Disposition  • Urinating more frequently than usual (i.e., frequency)    Protocols used: Urinary Symptoms-A-OH

## 2025-07-08 NOTE — TELEPHONE ENCOUNTER
Action Requested: Summary for Provider     []  Critical Lab, Recommendations Needed  [] Need Additional Advice  []   FYI    []   Need Orders  [] Need Medications Sent to Pharmacy  []  Other     SUMMARY: Called and spoke to pt. Patient stated she has had urinary frequency over past 4-5 days, feels \"tingling\" sensation when urinating. No known fever, denies foul smelling urine. No appts available until 7/10, advised to be seen in [facility] for eval. Patient stated understanding and agreed to plan.     Reason for call: Urinary Frequency  Onset: 4-5 days

## 2025-07-09 ENCOUNTER — OFFICE VISIT (OUTPATIENT)
Dept: FAMILY MEDICINE CLINIC | Facility: CLINIC | Age: 75
End: 2025-07-09
Payer: COMMERCIAL

## 2025-07-09 VITALS
DIASTOLIC BLOOD PRESSURE: 60 MMHG | WEIGHT: 123 LBS | HEIGHT: 61 IN | TEMPERATURE: 98 F | BODY MASS INDEX: 23.22 KG/M2 | HEART RATE: 54 BPM | SYSTOLIC BLOOD PRESSURE: 124 MMHG | OXYGEN SATURATION: 98 % | RESPIRATION RATE: 14 BRPM

## 2025-07-09 DIAGNOSIS — R35.0 FREQUENCY OF URINATION: Primary | ICD-10-CM

## 2025-07-09 LAB
BILIRUBIN: NEGATIVE
GLUCOSE (URINE DIPSTICK): NEGATIVE MG/DL
KETONES (URINE DIPSTICK): NEGATIVE MG/DL
MULTISTIX LOT#: ABNORMAL NUMERIC
NITRITE, URINE: NEGATIVE
PH, URINE: 5.5 (ref 4.5–8)
SPECIFIC GRAVITY: 1.02 (ref 1–1.03)
URINE-COLOR: YELLOW
UROBILINOGEN,SEMI-QN: 0.2 MG/DL (ref 0–1.9)

## 2025-07-09 PROCEDURE — 3078F DIAST BP <80 MM HG: CPT | Performed by: FAMILY MEDICINE

## 2025-07-09 PROCEDURE — 81003 URINALYSIS AUTO W/O SCOPE: CPT | Performed by: FAMILY MEDICINE

## 2025-07-09 PROCEDURE — 87186 SC STD MICRODIL/AGAR DIL: CPT | Performed by: FAMILY MEDICINE

## 2025-07-09 PROCEDURE — 87077 CULTURE AEROBIC IDENTIFY: CPT | Performed by: FAMILY MEDICINE

## 2025-07-09 PROCEDURE — 3074F SYST BP LT 130 MM HG: CPT | Performed by: FAMILY MEDICINE

## 2025-07-09 PROCEDURE — 99213 OFFICE O/P EST LOW 20 MIN: CPT | Performed by: FAMILY MEDICINE

## 2025-07-09 PROCEDURE — 3008F BODY MASS INDEX DOCD: CPT | Performed by: FAMILY MEDICINE

## 2025-07-09 PROCEDURE — 87086 URINE CULTURE/COLONY COUNT: CPT | Performed by: FAMILY MEDICINE

## 2025-07-09 RX ORDER — AMOXICILLIN 500 MG/1
500 CAPSULE ORAL 2 TIMES DAILY
Qty: 14 CAPSULE | Refills: 0 | Status: SHIPPED | OUTPATIENT
Start: 2025-07-09 | End: 2025-07-16

## 2025-07-09 NOTE — PATIENT INSTRUCTIONS
Hold antibiotics for now.   Continue pushing fluids and monitoring symptoms.   We will send the urine specimen for culture and call you in 2-3 days with results  If your symptoms worsen while awaiting the culture or the culture indicates need for antibiotic treatment, start taking the medication.  Contact your GI and prescribing md for iron infusions to make them aware of your medication changes.   Take antibiotics with food and plenty of water.  Eat yogurt daily or use probiotics. (Align is a good example of an OTC probiotic)  Make sure to finish the entire antibiotic treatment if it's started unless otherwise instructed.    Increase fluid intake and bladder emptying.  Return in 3 days if not better. Call if fever, vomiting, worsening symptoms.

## 2025-07-09 NOTE — PROGRESS NOTES
Mea Mark is a 75 year old female.  Chief Complaint   Patient presents with    UTI     Frequency urinating x 5 days        HPI:   Patient presents with symptoms of UTI since 5 days ago. Pressure, low volume.  Pt increased fluid intake and feels sx have mostly improved today, but she called her md and they recommended visit today to r/o infection.   Pt reports that she did not complete a clean catch specimen because she wasn't sure she would be able to produce enough urine for testing.  Denies back pain, fever, hematuria.  Pt has remote history of UTI in past.  Prior cx revealed enterococcus and pt was given amox for that infection and pt noted complete resolution.     Pt has colonoscopy scheduled in on 7/16. Also has iron infusion scheduled tomorrow. Is worried about abx contraindications.     Current Outpatient Medications   Medication Sig Dispense Refill    amoxicillin 500 MG Oral Cap Take 1 capsule (500 mg total) by mouth 2 (two) times daily for 7 days. 14 capsule 0    IRON OR Take 325 mg by mouth in the morning.      Magnesium Bisglycinate (MAG GLYCINATE OR) Take 400 mg by mouth in the morning.      cyclobenzaprine 5 MG Oral Tab Take 1 tablet (5 mg total) by mouth nightly as needed for Muscle spasms. 20 tablet 0    potassium citrate (UROCIT-K 5) 5 MEQ (540 MG) Oral Tab CR Take 1 tablet (5 mEq total) by mouth in the morning, at noon, and at bedtime. 270 tablet 5    potassium citrate-citric acid 1100-334 MG/5ML Oral Solution Take 15 mL (30 mEq total) by mouth 3 (three) times daily with meals. 473 mL 11    Omeprazole 40 MG Oral Capsule Delayed Release Take 1 capsule (40 mg total) by mouth daily. 90 capsule 3    Risankizumab-rzaa (SKYRIZI) 360 MG/2.4ML Subcutaneous Solution Cartridge Inject 2.4 mL into the skin Every 8 (eight) weeks. 2.4 mL 5    clotrimazole-betamethasone 1-0.05 % External Cream APPLY TO THE AFFECTED AREA(S) TWICE DAILY FOR TWO WEEKS 60 g 2    escitalopram 5 MG Oral Tab Take 1 tablet (5 mg  total) by mouth daily. 90 tablet 3    HYDROcodone-acetaminophen (NORCO) 5-325 MG Oral Tab Take 1 tablet by mouth every 6 (six) hours as needed for Pain. 30 tablet 0    HYDROcodone-acetaminophen 5-325 MG Oral Tab Take 1-2 tablets by mouth every 4 (four) hours as needed for Pain. 20 tablet 0    ondansetron (ZOFRAN) 4 mg tablet Take 1 tablet (4 mg total) by mouth every 8 (eight) hours as needed for Nausea. 90 tablet 0    Glucose Blood (ACCU-CHEK GUIDE) In Vitro Strip Use 1 (one) new strip daily for blood glucose monitoring 100 strip 3    Accu-Chek FastClix Lancets Does not apply Misc 1 Lancet by Finger stick route 2 (two) times daily.      ketoconazole 2 % External Cream Apply 1 Application topically 2 (two) times daily. APPLY TO AFFECTED AREA      HYDROcodone-acetaminophen (NORCO) 5-325 MG Oral Tab Take 1 tablet by mouth every 6 (six) hours as needed for Pain. 30 tablet 0    metRONIDAZOLE 0.75 % External Cream apply TO AFFECTED AREA ON face EVERY DAY BEFORE NOON      pimecrolimus 1 % External Cream apply topically TWICE DAILY TO affected AREAS      Sulfacetamide Sodium-Sulfur 10-5 % External Liquid Apply 1 Application topically 2 (two) times daily.      triamcinolone 0.1 % External Cream APPLY TO THE AFFECTED AREA(S) ON BACK TWICE DAILY FOR UP TO TWO WEEKS. AVOID face, armpits AND groin      albuterol (VENTOLIN HFA) 108 (90 Base) MCG/ACT Inhalation Aero Soln Inhale 2 puffs into the lungs every 6 (six) hours as needed for Wheezing. 1 each 3    Tiotropium Bromide Monohydrate (SPIRIVA RESPIMAT) 2.5 MCG/ACT Inhalation Aero Soln Inhale 2 puffs into the lungs daily. 1 each 3    acetaminophen 500 MG Oral Tab Take 1 tablet (500 mg total) by mouth every 6 (six) hours as needed for Pain.       Current Facility-Administered Medications   Medication Dose Route Frequency Provider Last Rate Last Admin    Risankizumab-rzaa (Skyrizi) 600 mg in dextrose 5% 260 mL infusion  600 mg Intravenous Once Patrice Lewis MD   Stopped at  11/20/24 1327      Past Medical History[1]   Social History:  Short Social Hx on File[2]      REVIEW OF SYSTEMS:   GENERAL HEALTH: no fever/chills or fatigue  SKIN: denies any unusual skin lesions or rashes  RESPIRATORY: no shortness of breath with exertion  CARDIOVASCULAR: denies chest pain on exertion and palpitations.  GI: no nausea or vomiting  : as above.  NEURO: denies headaches or dizziness.    EXAM:   /60   Pulse 54   Temp 97.8 °F (36.6 °C) (Oral)   Resp 14   Ht 5' 1\" (1.549 m)   Wt 123 lb (55.8 kg)   LMP  (LMP Unknown)   SpO2 98%   BMI 23.24 kg/m²   GENERAL: well developed, well nourished,in no apparent distress, pleasant pt.  SKIN: no rashes,no suspicious lesions  LUNG: Clear to auscultation bilaterally. No W/R/R.  HEART: RRR, no murmur.  GI: normoactive BS x4, no masses, HSM; no suprapubic tenderness, no CVAT    RESULTS:      Recent Results (from the past 24 hours)   URINALYSIS, AUTO, W/O SCOPE    Collection Time: 07/09/25  9:57 AM   Result Value Ref Range    Glucose Urine Negative Negative mg/dL    Bilirubin Urine Negative Negative    Ketones, UA Negative Negative - Trace mg/dL    Spec Gravity 1.025 1.005 - 1.030    Blood Urine Small (A) Negative    PH Urine 5.5 5.0 - 8.0    Protein Urine Trace Negative - Trace mg/dL    Urobilinogen Urine 0.2 0.2 - 1.0 mg/dL    Nitrite Urine Negative Negative    Leukocyte Esterase Urine Trace (A) Negative    APPEARANCE Cloudy Clear    Color Urine Yellow Yellow    Multistix Lot# [id number] Numeric    Multistix Expiration Date 3/31/2026 Date       ASSESSMENT AND PLAN:     Encounter Diagnosis   Name Primary?    Frequency of urination Yes       Orders Placed This Encounter   Procedures    URINALYSIS, AUTO, W/O SCOPE    Urine Culture, Routine       Meds & Refills for this Visit:  Requested Prescriptions     Signed Prescriptions Disp Refills    amoxicillin 500 MG Oral Cap 14 capsule 0     Sig: Take 1 capsule (500 mg total) by mouth 2 (two) times daily for 7 days.          Patient Instructions   Hold antibiotics for now.   Continue pushing fluids and monitoring symptoms.   We will send the urine specimen for culture and call you in 2-3 days with results  If your symptoms worsen while awaiting the culture or the culture indicates need for antibiotic treatment, start taking the medication.  Contact your GI and prescribing md for iron infusions to make them aware of your medication changes.   Take antibiotics with food and plenty of water.  Eat yogurt daily or use probiotics. (Align is a good example of an OTC probiotic)  Make sure to finish the entire antibiotic treatment if it's started unless otherwise instructed.    Increase fluid intake and bladder emptying.  Return in 3 days if not better. Call if fever, vomiting, worsening symptoms.    The patient indicates understanding of these issues and agrees to the plan.       [1]   Past Medical History:   Abdominal distention    Abdominal hernia    2021    Abdominal pain    years ago before ostomy    Allergic rhinitis    not sure of date    Anemia    occasional in the past    Anxiety    Arthritis    Asthma (HCC)    Atypical mole    Back pain    Bad breath    mouth hot/dry    Belching    Some liquids have begun causing belching. Sodas, sometimes water    Black stools    years ago    Bleeding nose    Bloating    Blood in the stool    years ago    Blood in urine    Blurred vision    Body piercing    Calculus of kidney    Have just been told I have kidney stones by [facility] Doctor    Cancer (HCC)    Nose Cancer Removal    Change in hair    hair drier than usual    Chest pain on exertion    pressure    Chronic cough    Colon cancer (HCC)    COPD (chronic obstructive pulmonary disease) (HCC)    Crohn's disease (HCC)    Depression    going throuh illnesses/started on Lexapro    Diabetes (HCC)    Prednisone Induced. Tested blood for couple months until found not diabetic through blood test. Still check blood    Diabetes mellitus  (HCC)    Diarrhea, unspecified    Dizziness    still not steady    Easy bruising    Fatigue    Fever    Flatulence/gas pain/belching    No more than normal    Food intolerance    Frequent urination    Headache disorder    from deyhdration?    Hearing impairment    hearing aids    Hearing loss    Dr. Perea    Heartburn    Hemorrhoids    High cholesterol    History of stomach ulcers    Indigestion    have no gallbladder    Irregular bowel habits    chron's    Irritable bowel syndrome    Itch of skin    all over/not severe but bothersome when skin very dry. Scratch & is inflammed    Kidney disorder    renal failure /emerg room    Kidney failure    mild    KIDNEY STONE    removed some 2024, still have a 5.0 and others    Leg swelling    ankles-once in a while    Loss of appetite    Mouth sores    fever blisters inside lips/tongue    Nausea    since January 2018-occasional    Night sweats    Osteoarthritis    hands, knees    Pain in joints    Painful swallowing    not painful but uncomfortable    Painful urination    Prediabetes    Problems with swallowing    Began taking Terbinafine March 22, 2023 for a toenail fungal infection. Began noticing irritated throat/top of gut were burning and swallowing becoming uncomfortable. Also experiencing weakness, dehydration, nausea, taste buds gone, loss of appetite    Rash    Shortness of breath    smoker    Skin blushing/flushing    years ago with active crohn's/before ileostomy    Sleep apnea    trouble sleeping since June 2023    Sleep disturbance    Sputum production    Stool incontinence    years ago    Stress    typical on occasion    Uncomfortable fullness after meals    Visual impairment    glasses    Vomiting    Wears glasses    Weight gain    Weight loss    dieted in Sept. 2022. lost about 8 lbs. Regained about 4. April, 2023 taste buds destroyed (temporarily?) due to reaction to Terbinafine. Lost about 20 lbs. between late April & September, 2023.    Wheezing    [2]   Social History  Socioeconomic History    Marital status:    Tobacco Use    Smoking status: Every Day     Current packs/day: 0.50     Average packs/day: 0.5 packs/day for 52.0 years (26.0 ttl pk-yrs)     Types: Cigarettes     Start date: 7/5/1973    Smokeless tobacco: Never    Tobacco comments:     tried Chantix three times-after about 2 months I became depressed and irritable   Vaping Use    Vaping status: Never Used   Substance and Sexual Activity    Alcohol use: Not Currently     Comment: very rarely consume alcohol    Drug use: Not Currently     Types: Cannabis     Comment: occasionally    Sexual activity: Not Currently   Other Topics Concern    Caffeine Concern Yes    Exercise Yes    Seat Belt Yes   Social History Narrative    , no children.  Retired, previously worked in administration.     Social Drivers of Health     Food Insecurity: No Food Insecurity (6/4/2025)    NCSS - Food Insecurity     Worried About Running Out of Food in the Last Year: No     Ran Out of Food in the Last Year: No   Transportation Needs: No Transportation Needs (6/4/2025)    NCSS - Transportation     Lack of Transportation: No   Housing Stability: Not At Risk (6/4/2025)    NCSS - Housing/Utilities     Has Housing: Yes     Worried About Losing Housing: No     Unable to Get Utilities: No

## 2025-07-12 RX ORDER — CEPHALEXIN 500 MG/1
500 CAPSULE ORAL 2 TIMES DAILY
Qty: 14 CAPSULE | Refills: 0 | Status: SHIPPED | OUTPATIENT
Start: 2025-07-12 | End: 2025-07-19

## 2025-07-16 PROBLEM — D50.9 IRON DEFICIENCY ANEMIA, UNSPECIFIED: Status: ACTIVE | Noted: 2025-07-16

## 2025-07-16 PROBLEM — R13.10 DYSPHAGIA, UNSPECIFIED: Status: ACTIVE | Noted: 2025-07-16

## 2025-07-16 PROBLEM — K50.90 CROHN DISEASE (HCC): Status: ACTIVE | Noted: 2025-07-16

## 2025-07-16 PROCEDURE — 88342 IMHCHEM/IMCYTCHM 1ST ANTB: CPT | Performed by: INTERNAL MEDICINE

## 2025-07-18 ENCOUNTER — TELEPHONE (OUTPATIENT)
Dept: INTERNAL MEDICINE CLINIC | Facility: CLINIC | Age: 75
End: 2025-07-18

## 2025-07-18 DIAGNOSIS — Z13.220 SCREENING FOR LIPOID DISORDERS: ICD-10-CM

## 2025-07-18 DIAGNOSIS — E11.9 NEW ONSET TYPE 2 DIABETES MELLITUS (HCC): Primary | ICD-10-CM

## 2025-07-18 DIAGNOSIS — Z13.0 SCREENING FOR BLOOD DISEASE: ICD-10-CM

## 2025-07-18 DIAGNOSIS — Z13.228 SCREENING FOR METABOLIC DISORDER: ICD-10-CM

## 2025-07-18 DIAGNOSIS — Z00.00 ROUTINE GENERAL MEDICAL EXAMINATION AT HEALTH CARE FACILITY: ICD-10-CM

## 2025-07-18 DIAGNOSIS — Z13.29 SCREENING FOR THYROID DISORDER: ICD-10-CM

## 2025-07-18 NOTE — TELEPHONE ENCOUNTER
Patient called request labs prior to their annual physical.  Annual physical scheduled for   Future Appointments   Date Time Provider Department Center   9/22/2025  5:20 PM Mahi Valentino MD [facility]        Please order labs. Patient preferred lab is [facility]  Patient informed request was sent to clinical team.  Patient informed to fast for labs.  No callback required.

## 2025-07-28 ENCOUNTER — LAB ENCOUNTER (OUTPATIENT)
Dept: LAB | Age: 75
End: 2025-07-28
Payer: COMMERCIAL

## 2025-07-28 DIAGNOSIS — E83.42 HYPOMAGNESEMIA: ICD-10-CM

## 2025-07-28 DIAGNOSIS — K50.019 CROHN'S DISEASE OF SMALL INTESTINE WITH COMPLICATION (HCC): ICD-10-CM

## 2025-07-28 LAB — MAGNESIUM SERPL-MCNC: 1.4 MG/DL (ref 1.6–2.6)

## 2025-07-28 PROCEDURE — 36415 COLL VENOUS BLD VENIPUNCTURE: CPT

## 2025-07-28 PROCEDURE — 83735 ASSAY OF MAGNESIUM: CPT

## 2025-07-28 PROCEDURE — 86480 TB TEST CELL IMMUN MEASURE: CPT

## 2025-07-29 ENCOUNTER — TELEPHONE (OUTPATIENT)
Dept: INTERNAL MEDICINE CLINIC | Facility: CLINIC | Age: 75
End: 2025-07-29

## 2025-07-30 LAB
M TB IFN-G CD4+ T-CELLS BLD-ACNC: 0.05 IU/ML
M TB TUBERC IFN-G BLD QL: NEGATIVE
M TB TUBERC IGNF/MITOGEN IGNF CONTROL: >10 IU/ML
QFT TB1 AG MINUS NIL: 0 IU/ML
QFT TB2 AG MINUS NIL: 0.01 IU/ML

## (undated) DIAGNOSIS — R21 RASH: Primary | ICD-10-CM

## (undated) DEVICE — BASKET STONE RETRV L120CM DIA12MM SHTH 1.9FR

## (undated) DEVICE — FIBER LSR 200UM 2J 80HZ 60W DL FOR LITHO

## (undated) DEVICE — ZIPWIRE GUIDEWIRE .038X150 STR

## (undated) DEVICE — SLEEVE COMPR M KNEE LEN SGL USE KENDALL SCD

## (undated) DEVICE — SENS GUIW URO 150CM NIT SS

## (undated) DEVICE — SHEATH URET L36CM OD13FR ID11FR HYDRPHLC

## (undated) DEVICE — SOLUTION IRRIG 3000ML 0.9% NACL FLX CONT

## (undated) DEVICE — URETEROSCOPE MED STD FLX DGT W/ EMPOWER BNDL

## (undated) DEVICE — STICK SPNG 8IN MED POVIDONE IOD PAINT

## (undated) DEVICE — PACK PBDS CYSTOSCOPY

## (undated) DEVICE — SRG GLOVE PROTEXIS TXTRD 7.5

## (undated) DEVICE — SYSTEM PMP SYR 10CC VAC SGL ACT 1 W VLV

## (undated) DEVICE — Device

## (undated) DEVICE — SPONGE GZ 4XL4IN 100% COT 12 PLY TYP VII WVN

## (undated) DEVICE — SEAL BX PRT Y ADPT ADJ

## (undated) DEVICE — SYRINGE MED 20ML STD CLR PLAS LL TIP N CTRL

## (undated) NOTE — LETTER
ASTHMA ACTION PLAN for Kim Day     : 1950     Date: 3/25/2021  Provider:  Enrique Ceron MD  Phone for doctor or clinic: [facility], [address], [facility]. [address], [address] [facility]  [phone]

## (undated) NOTE — ED AVS SNAPSHOT
Michele Cintron   MRN: [id number]    Department:  [facility] Emergency Department   Date of Visit:  3/14/2020           Disclosure     Insurance plans vary and the physician(s) referred by the ER may not be covered by your plan.  Please contact y tell this physician (or your personal doctor if your instructions are to return to your personal doctor) about any new or lasting problems. The primary care or specialist physician will see patients referred from the [facility] Emergency Department.  Berta Ham